# Patient Record
Sex: FEMALE | Race: BLACK OR AFRICAN AMERICAN | NOT HISPANIC OR LATINO | URBAN - METROPOLITAN AREA
[De-identification: names, ages, dates, MRNs, and addresses within clinical notes are randomized per-mention and may not be internally consistent; named-entity substitution may affect disease eponyms.]

---

## 2023-05-19 ENCOUNTER — EMERGENCY (EMERGENCY)
Facility: HOSPITAL | Age: 58
LOS: 1 days | Discharge: HOME | End: 2023-05-19
Attending: STUDENT IN AN ORGANIZED HEALTH CARE EDUCATION/TRAINING PROGRAM | Admitting: STUDENT IN AN ORGANIZED HEALTH CARE EDUCATION/TRAINING PROGRAM
Payer: SELF-PAY

## 2023-05-19 VITALS
DIASTOLIC BLOOD PRESSURE: 90 MMHG | SYSTOLIC BLOOD PRESSURE: 158 MMHG | TEMPERATURE: 98 F | OXYGEN SATURATION: 100 % | RESPIRATION RATE: 16 BRPM | HEART RATE: 96 BPM

## 2023-05-19 VITALS
RESPIRATION RATE: 17 BRPM | HEART RATE: 95 BPM | OXYGEN SATURATION: 100 % | TEMPERATURE: 98 F | DIASTOLIC BLOOD PRESSURE: 83 MMHG | SYSTOLIC BLOOD PRESSURE: 154 MMHG

## 2023-05-19 DIAGNOSIS — F20.9 SCHIZOPHRENIA, UNSPECIFIED: ICD-10-CM

## 2023-05-19 LAB
ALBUMIN SERPL ELPH-MCNC: 4.1 G/DL — SIGNIFICANT CHANGE UP (ref 3.3–5)
ALP SERPL-CCNC: 73 U/L — SIGNIFICANT CHANGE UP (ref 40–120)
ALT FLD-CCNC: 11 U/L — SIGNIFICANT CHANGE UP (ref 4–33)
AMPHET UR-MCNC: NEGATIVE — SIGNIFICANT CHANGE UP
ANION GAP SERPL CALC-SCNC: 16 MMOL/L — HIGH (ref 7–14)
APAP SERPL-MCNC: <10 UG/ML — LOW (ref 15–25)
APPEARANCE UR: ABNORMAL
AST SERPL-CCNC: 17 U/L — SIGNIFICANT CHANGE UP (ref 4–32)
B PERT DNA SPEC QL NAA+PROBE: SIGNIFICANT CHANGE UP
B PERT+PARAPERT DNA PNL SPEC NAA+PROBE: SIGNIFICANT CHANGE UP
BACTERIA # UR AUTO: ABNORMAL
BARBITURATES UR SCN-MCNC: NEGATIVE — SIGNIFICANT CHANGE UP
BASOPHILS # BLD AUTO: 0.02 K/UL — SIGNIFICANT CHANGE UP (ref 0–0.2)
BASOPHILS NFR BLD AUTO: 0.3 % — SIGNIFICANT CHANGE UP (ref 0–2)
BENZODIAZ UR-MCNC: NEGATIVE — SIGNIFICANT CHANGE UP
BILIRUB SERPL-MCNC: 0.3 MG/DL — SIGNIFICANT CHANGE UP (ref 0.2–1.2)
BILIRUB UR-MCNC: NEGATIVE — SIGNIFICANT CHANGE UP
BORDETELLA PARAPERTUSSIS (RAPRVP): SIGNIFICANT CHANGE UP
BUN SERPL-MCNC: 14 MG/DL — SIGNIFICANT CHANGE UP (ref 7–23)
C PNEUM DNA SPEC QL NAA+PROBE: SIGNIFICANT CHANGE UP
CALCIUM SERPL-MCNC: 8.9 MG/DL — SIGNIFICANT CHANGE UP (ref 8.4–10.5)
CHLORIDE SERPL-SCNC: 99 MMOL/L — SIGNIFICANT CHANGE UP (ref 98–107)
CO2 SERPL-SCNC: 25 MMOL/L — SIGNIFICANT CHANGE UP (ref 22–31)
COCAINE METAB.OTHER UR-MCNC: NEGATIVE — SIGNIFICANT CHANGE UP
COLOR SPEC: YELLOW — SIGNIFICANT CHANGE UP
CREAT SERPL-MCNC: 0.98 MG/DL — SIGNIFICANT CHANGE UP (ref 0.5–1.3)
CREATININE URINE RESULT, DAU: 163 MG/DL — SIGNIFICANT CHANGE UP
DIFF PNL FLD: NEGATIVE — SIGNIFICANT CHANGE UP
EGFR: 68 ML/MIN/1.73M2 — SIGNIFICANT CHANGE UP
EOSINOPHIL # BLD AUTO: 0.11 K/UL — SIGNIFICANT CHANGE UP (ref 0–0.5)
EOSINOPHIL NFR BLD AUTO: 1.6 % — SIGNIFICANT CHANGE UP (ref 0–6)
EPI CELLS # UR: 22 /HPF — HIGH (ref 0–5)
ETHANOL SERPL-MCNC: <10 MG/DL — SIGNIFICANT CHANGE UP
FLUAV SUBTYP SPEC NAA+PROBE: SIGNIFICANT CHANGE UP
FLUBV RNA SPEC QL NAA+PROBE: SIGNIFICANT CHANGE UP
GLUCOSE SERPL-MCNC: 154 MG/DL — HIGH (ref 70–99)
GLUCOSE UR QL: NEGATIVE — SIGNIFICANT CHANGE UP
HADV DNA SPEC QL NAA+PROBE: SIGNIFICANT CHANGE UP
HCG SERPL-ACNC: <1 MIU/ML — SIGNIFICANT CHANGE UP
HCOV 229E RNA SPEC QL NAA+PROBE: SIGNIFICANT CHANGE UP
HCOV HKU1 RNA SPEC QL NAA+PROBE: SIGNIFICANT CHANGE UP
HCOV NL63 RNA SPEC QL NAA+PROBE: SIGNIFICANT CHANGE UP
HCOV OC43 RNA SPEC QL NAA+PROBE: SIGNIFICANT CHANGE UP
HCT VFR BLD CALC: 35.8 % — SIGNIFICANT CHANGE UP (ref 34.5–45)
HGB BLD-MCNC: 11.6 G/DL — SIGNIFICANT CHANGE UP (ref 11.5–15.5)
HMPV RNA SPEC QL NAA+PROBE: SIGNIFICANT CHANGE UP
HPIV1 RNA SPEC QL NAA+PROBE: SIGNIFICANT CHANGE UP
HPIV2 RNA SPEC QL NAA+PROBE: SIGNIFICANT CHANGE UP
HPIV3 RNA SPEC QL NAA+PROBE: SIGNIFICANT CHANGE UP
HPIV4 RNA SPEC QL NAA+PROBE: SIGNIFICANT CHANGE UP
HYALINE CASTS # UR AUTO: ABNORMAL (ref 0–7)
IANC: 4.95 K/UL — SIGNIFICANT CHANGE UP (ref 1.8–7.4)
IMM GRANULOCYTES NFR BLD AUTO: 0.3 % — SIGNIFICANT CHANGE UP (ref 0–0.9)
KETONES UR-MCNC: NEGATIVE — SIGNIFICANT CHANGE UP
LEUKOCYTE ESTERASE UR-ACNC: ABNORMAL
LYMPHOCYTES # BLD AUTO: 1.21 K/UL — SIGNIFICANT CHANGE UP (ref 1–3.3)
LYMPHOCYTES # BLD AUTO: 18 % — SIGNIFICANT CHANGE UP (ref 13–44)
M PNEUMO DNA SPEC QL NAA+PROBE: SIGNIFICANT CHANGE UP
MCHC RBC-ENTMCNC: 25.8 PG — LOW (ref 27–34)
MCHC RBC-ENTMCNC: 32.4 GM/DL — SIGNIFICANT CHANGE UP (ref 32–36)
MCV RBC AUTO: 79.6 FL — LOW (ref 80–100)
METHADONE UR-MCNC: NEGATIVE — SIGNIFICANT CHANGE UP
MONOCYTES # BLD AUTO: 0.42 K/UL — SIGNIFICANT CHANGE UP (ref 0–0.9)
MONOCYTES NFR BLD AUTO: 6.2 % — SIGNIFICANT CHANGE UP (ref 2–14)
NEUTROPHILS # BLD AUTO: 4.95 K/UL — SIGNIFICANT CHANGE UP (ref 1.8–7.4)
NEUTROPHILS NFR BLD AUTO: 73.6 % — SIGNIFICANT CHANGE UP (ref 43–77)
NITRITE UR-MCNC: NEGATIVE — SIGNIFICANT CHANGE UP
NRBC # BLD: 0 /100 WBCS — SIGNIFICANT CHANGE UP (ref 0–0)
NRBC # FLD: 0 K/UL — SIGNIFICANT CHANGE UP (ref 0–0)
OPIATES UR-MCNC: NEGATIVE — SIGNIFICANT CHANGE UP
OXYCODONE UR-MCNC: NEGATIVE — SIGNIFICANT CHANGE UP
PCP SPEC-MCNC: SIGNIFICANT CHANGE UP
PCP UR-MCNC: NEGATIVE — SIGNIFICANT CHANGE UP
PH UR: 7 — SIGNIFICANT CHANGE UP (ref 5–8)
PLATELET # BLD AUTO: 317 K/UL — SIGNIFICANT CHANGE UP (ref 150–400)
POTASSIUM SERPL-MCNC: 2.8 MMOL/L — CRITICAL LOW (ref 3.5–5.3)
POTASSIUM SERPL-SCNC: 2.8 MMOL/L — CRITICAL LOW (ref 3.5–5.3)
PROT SERPL-MCNC: 7.3 G/DL — SIGNIFICANT CHANGE UP (ref 6–8.3)
PROT UR-MCNC: ABNORMAL
RAPID RVP RESULT: DETECTED
RBC # BLD: 4.5 M/UL — SIGNIFICANT CHANGE UP (ref 3.8–5.2)
RBC # FLD: 14.6 % — HIGH (ref 10.3–14.5)
RBC CASTS # UR COMP ASSIST: 4 /HPF — SIGNIFICANT CHANGE UP (ref 0–4)
RSV RNA SPEC QL NAA+PROBE: SIGNIFICANT CHANGE UP
RV+EV RNA SPEC QL NAA+PROBE: DETECTED
SALICYLATES SERPL-MCNC: <0.3 MG/DL — LOW (ref 15–30)
SARS-COV-2 RNA SPEC QL NAA+PROBE: SIGNIFICANT CHANGE UP
SODIUM SERPL-SCNC: 140 MMOL/L — SIGNIFICANT CHANGE UP (ref 135–145)
SP GR SPEC: 1.02 — SIGNIFICANT CHANGE UP (ref 1.01–1.05)
THC UR QL: NEGATIVE — SIGNIFICANT CHANGE UP
TOXICOLOGY SCREEN, DRUGS OF ABUSE, SERUM RESULT: SIGNIFICANT CHANGE UP
TSH SERPL-MCNC: 1.12 UIU/ML — SIGNIFICANT CHANGE UP (ref 0.27–4.2)
UROBILINOGEN FLD QL: SIGNIFICANT CHANGE UP
WBC # BLD: 6.73 K/UL — SIGNIFICANT CHANGE UP (ref 3.8–10.5)
WBC # FLD AUTO: 6.73 K/UL — SIGNIFICANT CHANGE UP (ref 3.8–10.5)
WBC UR QL: 16 /HPF — HIGH (ref 0–5)

## 2023-05-19 PROCEDURE — 70450 CT HEAD/BRAIN W/O DYE: CPT | Mod: 26,MA

## 2023-05-19 PROCEDURE — 90792 PSYCH DIAG EVAL W/MED SRVCS: CPT

## 2023-05-19 PROCEDURE — 99285 EMERGENCY DEPT VISIT HI MDM: CPT

## 2023-05-19 RX ORDER — CEPHALEXIN 500 MG
1 CAPSULE ORAL
Qty: 20 | Refills: 0
Start: 2023-05-19 | End: 2023-05-28

## 2023-05-19 RX ORDER — CEPHALEXIN 500 MG
500 CAPSULE ORAL ONCE
Refills: 0 | Status: COMPLETED | OUTPATIENT
Start: 2023-05-19 | End: 2023-05-19

## 2023-05-19 RX ORDER — HALOPERIDOL DECANOATE 100 MG/ML
5 INJECTION INTRAMUSCULAR ONCE
Refills: 0 | Status: COMPLETED | OUTPATIENT
Start: 2023-05-19 | End: 2023-05-19

## 2023-05-19 RX ORDER — POTASSIUM CHLORIDE 20 MEQ
40 PACKET (EA) ORAL ONCE
Refills: 0 | Status: COMPLETED | OUTPATIENT
Start: 2023-05-19 | End: 2023-05-19

## 2023-05-19 RX ADMIN — Medication 500 MILLIGRAM(S): at 13:43

## 2023-05-19 RX ADMIN — Medication 2 MILLIGRAM(S): at 10:10

## 2023-05-19 RX ADMIN — Medication 40 MILLIEQUIVALENT(S): at 13:42

## 2023-05-19 RX ADMIN — HALOPERIDOL DECANOATE 5 MILLIGRAM(S): 100 INJECTION INTRAMUSCULAR at 10:10

## 2023-05-19 NOTE — ED PROVIDER NOTE - CLINICAL SUMMARY MEDICAL DECISION MAKING FREE TEXT BOX
Pravin Salazar, DO: 57 yo f unk pmh, pw agitation. BIBEMS and PD, bedside provides collateral.  Reports patient has been agitated, banging on doors, screaming, being boulder.  Patient denies insight into condition.  Denies all acute medical complaints.  Minimal cooperation. Patient arrives agitated, unable to verbally de-escalate, requiring pharmacological intervention.  We will do AMS work-up, reassess.

## 2023-05-19 NOTE — ED PROVIDER NOTE - PATIENT PORTAL LINK FT
You can access the FollowMyHealth Patient Portal offered by [facility] by registering at the following website: [email or web address]. By joining [employer]’s FollowMyHealth portal, you will also be able to view your health information using other applications (apps) compatible with our system.

## 2023-05-19 NOTE — ED ADULT TRIAGE NOTE - CHIEF COMPLAINT QUOTE
states" take off theses cuffs ya blood clot pussy clots worthless piece of shits" pt [place] cuffs with [employer]

## 2023-05-19 NOTE — ED PROVIDER NOTE - CARE PLAN
1 Principal Discharge DX:	Agitation  Secondary Diagnosis:	Hypokalemia  Secondary Diagnosis:	Rhinovirus

## 2023-05-19 NOTE — ED BEHAVIORAL HEALTH ASSESSMENT NOTE - HPI (INCLUDE ILLNESS QUALITY, SEVERITY, DURATION, TIMING, CONTEXT, MODIFYING FACTORS, ASSOCIATED SIGNS AND SYMPTOMS)
58F, reportedly domiciled alone, on disability (wont say why), denying all psychiatric history (though PSYCKES says schizophrenia), denies prior suicide attempt, denies substance use history, denies violence/legal issues, denies PMH, brought in by EMS after neighbor reportedly called 911 after observing pt acting erratically.    Per EMS report "57 Y/O FEMALE FOUND AMBULATORY ON BEDROOM AFTER [employer] WAS ABLE TO MAKE ENTRY. PT WAS ALERT AND ORIENTED. NEIGHBOR CALLED SAYING "SHE WAS OUTSIDE SLAMMING FENCE REPEATEDLY AND THREATENING PEOPLE WALKING BY"    Patient was agitated at triage, yelling, received Haldol 5 and Ativan 2, but did not require restraints. Patient complied with medical assessment and labs.    On interview, pt laying on bed eating an orange, cooperative. She states she was in her home making breakfast, and suddenly the police walked in and took her here. She does not know why or who called. She denies any prior psychiatric history, was asked specifically about [place] treatment, which she ultimately denied after a long pause. She denies any current symptoms of depression, dorian and psychosis. She denies suicidal ideation and homicidal ideation, says "never". She denied getting into a fight or argument with anyone today, denies having trouble with the neighbors, when asked if the neighbors have a problem with her, she states "you have to ask them". States she has no family and lives alone, but has an adult daughter Jade who was at her home but left early this morning before this incident occurred. She does not have Jade's phone number, states "she changed it". Denies any substance use or medical history, denies taking any medications, denies allergies. When asked how she supports herself, she states "disability" and when asked why she is on disability, she states "my daughter applied for me, I am disabled" but does not explain further.    Police provided RN with number for daughter Myra [phone] but message was not returned.    [employer] reveals pt has a history of schizophrenia, has a 'non-medicaid' record with an [facility] ID which may indicate a prior [facility] hospitalization or but unable to verify. No medical dx in PSscrible, no medications, no other data.    No record of pt in [employer] or [employer].    See  note for additional collateral attempts. 58F, reportedly domiciled alone, on disability (wont say why), denying all psychiatric history (though [facility]), denies prior suicide attempt, denies substance use history, denies violence/legal issues, denies PMH, brought in by EMS after neighbor reportedly called 911 after observing pt acting erratically.    Per EMS report "57 Y/O FEMALE FOUND AMBULATORY ON BEDROOM AFTER [employer] WAS ABLE TO MAKE ENTRY. PT WAS ALERT AND ORIENTED. NEIGHBOR CALLED SAYING "SHE WAS OUTSIDE SLAMMING FENCE REPEATEDLY AND THREATENING PEOPLE WALKING BY"    Patient was agitated at triage, yelling profanities, received Haldol 5 and Ativan 2, but did not require restraints. Patient complied with medical assessment and labs.    On interview, pt laying on bed eating an orange, cooperative, making appropriate eye contact. Does not appear internally preoccupied. Appears angry over being brought here. She states she was in her home making breakfast, and suddenly the police walked in and took her here. She does not know why or who called. She denies any prior psychiatric history, was asked specifically about [place] treatment, which she ultimately denied after a long pause. She denies any current symptoms of depression, dorian and psychosis. She denies suicidal ideation and homicidal ideation, says "never". She denied getting into a fight or argument with anyone today, denies having trouble with the neighbors, when asked if the neighbors have a problem with her, she states "you have to ask them". States she has no family and lives alone, but has an adult daughter Jade who was at her home but left early this morning before this incident occurred. She does not have Jade's phone number, states "she changed it". Denies any substance use or medical history, denies taking any medications, denies allergies. When asked how she supports herself, she states "disability" and when asked why she is on disability, she states "my daughter applied for me, I am disabled" but does not explain further.    Police provided RN with number for daughter Myra [phone] but message was not returned.    [employer] reveals pt has a history of schizophrenia, has a 'non-medicaid' record with an [facility] ID which may indicate a prior [facility] hospitalization or but unable to verify. No medical dx in PSYCKES, no medications, no other data.    No record of pt in [employer] or I[employer].    See  note for additional collateral attempts. 58F, reportedly domiciled alone, on disability (wont say why), denying all psychiatric history (though PSDEDE says schizophrenia), denies prior suicide attempt, denies substance use history, denies violence/legal issues, denies PMH, brought in by EMS after neighbor reportedly called 911 after observing pt acting erratically.    Per EMS report "57 Y/O FEMALE FOUND AMBULATORY ON BEDROOM AFTER [employer] WAS ABLE TO MAKE ENTRY. PT WAS ALERT AND ORIENTED. NEIGHBOR CALLED SAYING "SHE WAS OUTSIDE SLAMMING FENCE REPEATEDLY AND THREATENING PEOPLE WALKING BY"    Patient was agitated at triage, yelling profanities, received Haldol 5 and Ativan 2, but did not require restraints. Patient complied with medical assessment and labs.    On interview, pt laying on bed eating an orange, cooperative, making appropriate eye contact. Does not appear internally preoccupied. Appears angry over being brought here. She states she was in her home making breakfast, and suddenly the police walked in and took her here. She does not know why or who called. She denies any prior psychiatric history, was asked specifically about [place] treatment, which she ultimately denied after a long pause. She denies any current symptoms of depression, dorian and psychosis. She denies suicidal ideation and homicidal ideation, says "never". She denied getting into a fight or argument with anyone today, denies having trouble with the neighbors, when asked if the neighbors have a problem with her, she states "you have to ask them". States she has no family and lives alone, but has an adult daughter Jade who was at her home but left early this morning before this incident occurred. She does not have Jade's phone number, states "she changed it". Denies any substance use or medical history, denies taking any medications, denies allergies. When asked how she supports herself, she states "disability" and when asked why she is on disability, she states "my daughter applied for me, I am disabled" but does not explain further.    Police provided RN with number for daughter Myra [phone] but message was not returned.    [employer] reveals pt has a history of schizophrenia, has a 'non-medicaid summary' with no record of medicaid enrollment, State  Assigned Physician Elder Miles; [facility] State ID: [id number] [facility] ID: [id number]. Which likely indicates a prior [facility] hospitalization, but unable to verify. [facility] provided services from 12/22/2004 to 4/16/2021. No medical dx in PSYCKES, no medications, no other data.    No record of pt in [employer] or I[employer].    See  note for additional collateral attempts. 58F, reportedly domiciled alone, on disability (wont say why), denying all psychiatric history (though PSDEDE says schizophrenia), denies prior suicide attempt, denies substance use history, denies violence/legal issues, denies PMH, brought in by EMS after neighbor reportedly called 911 after observing pt acting erratically.    Per EMS report "57 Y/O FEMALE FOUND AMBULATORY ON BEDROOM AFTER [employer] WAS ABLE TO MAKE ENTRY. PT WAS ALERT AND ORIENTED. NEIGHBOR CALLED SAYING "SHE WAS OUTSIDE SLAMMING FENCE REPEATEDLY AND THREATENING PEOPLE WALKING BY"    Patient was agitated at triage, yelling profanities, received Haldol 5 and Ativan 2, but did not require restraints. Patient complied with medical assessment and labs.    On interview, pt laying on bed eating an orange, cooperative, making appropriate eye contact. Does not appear internally preoccupied. Appears angry over being brought here. She states she was in her home making breakfast, and suddenly the police walked in and took her here. She does not know why or who called. She denies any prior psychiatric history, was asked specifically about [place] treatment, which she ultimately denied after a long pause. She denies any current symptoms of depression, dorian and psychosis. She denies suicidal ideation and homicidal ideation, says "never". She denied getting into a fight or argument with anyone today, denies having trouble with the neighbors, when asked if the neighbors have a problem with her, she states "you have to ask them". States she has no family and lives alone, but has an adult daughter Jade who was at her home but left early this morning before this incident occurred. She does not have Jade's phone number, states "she changed it". Denies any substance use or medical history, denies taking any medications, denies allergies. When asked how she supports herself, she states "disability" and when asked why she is on disability, she states "my daughter applied for me, I am disabled" but does not explain further.    Police provided RN with number for daughter Myra [phone] but message was not returned.    [employer] reveals pt has a history of schizophrenia, has a 'non-medicaid summary' with no record of medicaid enrollment, State  Assigned Physician Elder Miles; [facility] State ID: [id number] [facility] ID: [id number]. Which likely indicates a prior [facility] hospitalization, but unable to verify. [facility] provided services from 12/22/2004 to 4/16/2021. No medical dx in PSYCKES, no medications, no other data.    No record of pt in [employer] or [employer].    See  note for additional collateral attempts.    Patient observed for several hours in the ED. Has remained calm and cooperative, in good behavioral control, without evidence of internal preoccupation or response to internal stimuli.

## 2023-05-19 NOTE — ED BEHAVIORAL HEALTH ASSESSMENT NOTE - DESCRIPTION
reportedly lives alone and supports self with disability. has an adult daughter and a grandchild denies agitated at triage, screaming profanities, received Haldol and Ativan, later calm and cooperative, allowed medical evaluation and participated with interview.  Vital Signs Last 24 Hrs  T(C): 37.2 (19 May 2023 12:46), Max: 37.2 (19 May 2023 12:46)  T(F): 98.9 (19 May 2023 12:46), Max: 98.9 (19 May 2023 12:46)  HR: 97 (19 May 2023 12:46) (96 - 97)  BP: 139/73 (19 May 2023 12:46) (139/73 - 158/90)  BP(mean): --  RR: 17 (19 May 2023 12:46) (16 - 17)  SpO2: 99% (19 May 2023 12:46) (99% - 100%)    Parameters below as of 19 May 2023 12:46  Patient On (Oxygen Delivery Method): room air

## 2023-05-19 NOTE — ED BEHAVIORAL HEALTH ASSESSMENT NOTE - RISK ASSESSMENT
acute risks include lack of outpatient care, unclear family involvement. chronic risks include history of schizophrenia with presumed state hospitalization. protective factors include denial of all psychiatric symptoms and history, denial of prior suicide attempt or violence, denial of substance use or medical history. LOW IMMINENT risk not meeting dangerousness criteria for involuntary commitment. Will provide list of outpatient resources to further mitigate risk.

## 2023-05-19 NOTE — ED ADULT NURSE NOTE - OBJECTIVE STATEMENT
Received pt in  pt bought in by [employer]/[place]S from home for erratic behaviour pt yelling & screaming profanities denies si/hi presently, safety & comfort measures maintained eval on going.

## 2023-05-19 NOTE — ED BEHAVIORAL HEALTH NOTE - BEHAVIORAL HEALTH NOTE
Writer met with patient at bedside to discuss discharge. Patient requested assistance with taxi and says she does not have funds for an uber. She reports that she was brought from her home and did not have clothes. patient reports having a size large shirt and size large pants. she reports having slippers with her. patient reports her address is [phone] [place] [id number].     Writer contacted [facility] [id number] and requested clothing for the patient.    Writer to schedule taxi. Writer met with patient at bedside to discuss discharge. Patient requested assistance with taxi and says she does not have funds for an uber. She reports that she was brought from her home and did not have clothes. patient reports having a size large shirt and size large pants. she reports having slippers with her. patient reports her address is [phone] [place] [address].     Writer contacted [facility] [id number] and requested clothing for the patient.    Writer schedule taxi via j[employer] account 50 inv#[id number].

## 2023-05-19 NOTE — ED ADULT TRIAGE NOTE - CCCP TRG CHIEF CMPLNT
refused vitals yelling at triage time refused questions at triage time appears very agitated/agitation

## 2023-05-19 NOTE — ED PROVIDER NOTE - OBJECTIVE STATEMENT
59 yo f unk pmh, pw agitation. BIBEMS and PD, bedside provides collateral.  Reports patient has been agitated, banging on doors, screaming, being boulder.  Patient denies insight into condition.  Denies all acute medical complaints.  Minimal cooperation.

## 2023-05-19 NOTE — ED BEHAVIORAL HEALTH ASSESSMENT NOTE - SUMMARY
58F, reportedly domiciled alone, on disability (wont say why), denying all psychiatric history (though RANJANA says schizophrenia), denies prior suicide attempt, denies substance use history, denies violence/legal issues, denies PMH, brought in by EMS after neighbor reportedly called 911 after observing pt acting erratically. 58F, reportedly domiciled alone, on disability (wont say why), denying all psychiatric history (though RANJANA says schizophrenia), denies prior suicide attempt, denies substance use history, denies violence/legal issues, denies PMH, brought in by EMS after neighbor reportedly called 911 after observing pt acting erratically.  Patient arrived at triage screaming profanities and required medication, but calmed once the handcuffs were removed. She allowed medical assessment, went calmly for CT, and participated with interview. Although pt likely minimizing history, she presents as calm, cooperative, well related, makes appropriate eye contact. Does not appear internally preoccupied, is not observed to respond to internal stimuli. Patient denies all symptoms of a mood or psychotic episode and denies SI/HI. 58F, reportedly domiciled alone, on disability (wont say why), denying all psychiatric history (though ROSEYCARLITA says schizophrenia), denies prior suicide attempt, denies substance use history, denies violence/legal issues, denies PMH, brought in by EMS after neighbor reportedly called 911 after observing pt acting erratically.  Patient arrived at triage screaming profanities and required medication, but calmed once the handcuffs were removed. She allowed medical assessment, went calmly for CT, and participated with interview. Although pt likely minimizing history, she presents as calm, cooperative, well related, makes appropriate eye contact. Does not appear internally preoccupied, is not observed to respond to internal stimuli. Patient denies all symptoms of a mood or psychotic episode and denies SI/HI. Observed for several hours in the ED, pt has been interacting appropriately, remained in behavioral control, without evidence of formal thought disorder, internal preoccupation or response to internal stimuli. Patient reassessed and again denies any altercation or conflict, denies any psychiatric history and all psychiatric symptoms. Numerous attempts to obtain collateral were made without success. Based on patient's mental status exam, there is no indication that she is an imminent threat of harm to self or others warranting involuntary commitment at this time. No grounds to hold patient against her will. RYANNE.

## 2023-05-19 NOTE — ED PROVIDER NOTE - PHYSICAL EXAMINATION
General: agitated  HEENT: ncat  Neck: trachea ml  CV: well perfused  Resp: breathing non labored   MSK: full range of motion, no cyanosis, no edema, no clubbing, no immobility  Neuro: CN II-XII grossly intact, muscle strength 5/5 in all extremities, normal gait  Skin: no rashes, skin intact   psych: agitated

## 2023-05-19 NOTE — ED BEHAVIORAL HEALTH ASSESSMENT NOTE - OTHER PAST PSYCHIATRIC HISTORY (INCLUDE DETAILS REGARDING ONSET, COURSE OF ILLNESS, INPATIENT/OUTPATIENT TREATMENT)
pt denies all history including hospitalizations, medication trials and suicide attempts. Per RANJANA, diagnosed with schizophrenia, did have treatment at [facility] many years ago with Dr. Miles and was on an JUAN

## 2023-05-19 NOTE — ED ADULT NURSE NOTE - NSFALLUNIVINTERV_ED_ALL_ED
Bed/Stretcher in lowest position, wheels locked, appropriate side rails in place/Call bell, personal items and telephone in reach/Instruct patient to call for assistance before getting out of bed/chair/stretcher/Non-slip footwear applied when patient is off stretcher/[place] to call system/Physically safe environment - no spills, clutter or unnecessary equipment/Purposeful proactive rounding/Room/bathroom lighting operational, light cord in reach

## 2023-05-19 NOTE — ED BEHAVIORAL HEALTH NOTE - BEHAVIORAL HEALTH NOTE
worker printed [employer] information for information regarding patient. No collateral numbers listed. worker printed [employer] information for information regarding patient. No collateral numbers listed.  AS PER [employer], patient has provider listed dr. Miles ( [phone]). Writer called [facility].73 and spoke to Dr. Miles who states that he has not seen the patient for 7-9 years and the patient was discharged from clinic. He states that the patient has schizophrenia and was on an injection. He states that he is unable to say if the patient was inpatient at [place].   As per provider worker called patient’s daughter Farhana ([phone]) and left message for call back. Worker  then called back and it states the number that is dialed is not answering. worker printed [employer] information for information regarding patient. No collateral numbers listed.  AS PER [employer], patient has provider listed dr. Miles ( [phone]). Writer called [facility].73 and spoke to Dr. Miles who states that he has not seen the patient for 7-9 years and the patient was discharged from clinic. He states that the patient has schizophrenia and was on an injection. He states that he is unable to say if the patient was inpatient at [facility].   As per provider worker called patient’s daughter Farhana ([phone]) and left message for call back. Worker  then called back and it states the number that is dialed is not answering.    worker met with patient who states that she lives alone and her daughter comes by form time to time. No collateral numbers provided. Pt has no cell phone. Patient states she is not sure why she was brought in by ems.

## 2023-05-21 LAB
CULTURE RESULTS: SIGNIFICANT CHANGE UP
SPECIMEN SOURCE: SIGNIFICANT CHANGE UP

## 2023-09-12 ENCOUNTER — INPATIENT (INPATIENT)
Facility: HOSPITAL | Age: 58
LOS: 68 days | Discharge: HOME | End: 2023-11-20
Attending: PSYCHIATRY & NEUROLOGY | Admitting: PSYCHIATRY & NEUROLOGY
Payer: MEDICARE

## 2023-09-12 VITALS
HEART RATE: 90 BPM | SYSTOLIC BLOOD PRESSURE: 193 MMHG | RESPIRATION RATE: 18 BRPM | DIASTOLIC BLOOD PRESSURE: 108 MMHG | OXYGEN SATURATION: 100 %

## 2023-09-12 LAB
ALBUMIN SERPL ELPH-MCNC: 3.6 G/DL — SIGNIFICANT CHANGE UP (ref 3.3–5)
ALP SERPL-CCNC: 67 U/L — SIGNIFICANT CHANGE UP (ref 40–120)
ALT FLD-CCNC: 16 U/L — SIGNIFICANT CHANGE UP (ref 4–33)
AMPHET UR-MCNC: NEGATIVE — SIGNIFICANT CHANGE UP
ANION GAP SERPL CALC-SCNC: 9 MMOL/L — SIGNIFICANT CHANGE UP (ref 7–14)
APAP SERPL-MCNC: <10 UG/ML — LOW (ref 15–25)
APPEARANCE UR: ABNORMAL
AST SERPL-CCNC: 17 U/L — SIGNIFICANT CHANGE UP (ref 4–32)
BACTERIA # UR AUTO: ABNORMAL /HPF
BARBITURATES UR SCN-MCNC: NEGATIVE — SIGNIFICANT CHANGE UP
BASOPHILS # BLD AUTO: 0.02 K/UL — SIGNIFICANT CHANGE UP (ref 0–0.2)
BASOPHILS NFR BLD AUTO: 0.3 % — SIGNIFICANT CHANGE UP (ref 0–2)
BENZODIAZ UR-MCNC: NEGATIVE — SIGNIFICANT CHANGE UP
BILIRUB SERPL-MCNC: <0.2 MG/DL — SIGNIFICANT CHANGE UP (ref 0.2–1.2)
BILIRUB UR-MCNC: NEGATIVE — SIGNIFICANT CHANGE UP
BUN SERPL-MCNC: 19 MG/DL — SIGNIFICANT CHANGE UP (ref 7–23)
CALCIUM SERPL-MCNC: 8.4 MG/DL — SIGNIFICANT CHANGE UP (ref 8.4–10.5)
CHLORIDE SERPL-SCNC: 105 MMOL/L — SIGNIFICANT CHANGE UP (ref 98–107)
CO2 SERPL-SCNC: 26 MMOL/L — SIGNIFICANT CHANGE UP (ref 22–31)
COCAINE METAB.OTHER UR-MCNC: NEGATIVE — SIGNIFICANT CHANGE UP
COLOR SPEC: YELLOW — SIGNIFICANT CHANGE UP
CREAT SERPL-MCNC: 1.12 MG/DL — SIGNIFICANT CHANGE UP (ref 0.5–1.3)
CREATININE URINE RESULT, DAU: 296 MG/DL — SIGNIFICANT CHANGE UP
DIFF PNL FLD: ABNORMAL
EGFR: 58 ML/MIN/1.73M2 — LOW
EOSINOPHIL # BLD AUTO: 0.21 K/UL — SIGNIFICANT CHANGE UP (ref 0–0.5)
EOSINOPHIL NFR BLD AUTO: 3.2 % — SIGNIFICANT CHANGE UP (ref 0–6)
ETHANOL SERPL-MCNC: <10 MG/DL — SIGNIFICANT CHANGE UP
FLUAV AG NPH QL: SIGNIFICANT CHANGE UP
FLUBV AG NPH QL: SIGNIFICANT CHANGE UP
GLUCOSE SERPL-MCNC: 137 MG/DL — HIGH (ref 70–99)
GLUCOSE UR QL: NEGATIVE MG/DL — SIGNIFICANT CHANGE UP
HCT VFR BLD CALC: 34.3 % — LOW (ref 34.5–45)
HGB BLD-MCNC: 10.9 G/DL — LOW (ref 11.5–15.5)
IANC: 4.21 K/UL — SIGNIFICANT CHANGE UP (ref 1.8–7.4)
IMM GRANULOCYTES NFR BLD AUTO: 0.5 % — SIGNIFICANT CHANGE UP (ref 0–0.9)
KETONES UR-MCNC: ABNORMAL MG/DL
LEUKOCYTE ESTERASE UR-ACNC: ABNORMAL
LYMPHOCYTES # BLD AUTO: 1.44 K/UL — SIGNIFICANT CHANGE UP (ref 1–3.3)
LYMPHOCYTES # BLD AUTO: 21.7 % — SIGNIFICANT CHANGE UP (ref 13–44)
MCHC RBC-ENTMCNC: 26 PG — LOW (ref 27–34)
MCHC RBC-ENTMCNC: 31.8 GM/DL — LOW (ref 32–36)
MCV RBC AUTO: 81.7 FL — SIGNIFICANT CHANGE UP (ref 80–100)
METHADONE UR-MCNC: NEGATIVE — SIGNIFICANT CHANGE UP
MONOCYTES # BLD AUTO: 0.73 K/UL — SIGNIFICANT CHANGE UP (ref 0–0.9)
MONOCYTES NFR BLD AUTO: 11 % — SIGNIFICANT CHANGE UP (ref 2–14)
NEUTROPHILS # BLD AUTO: 4.21 K/UL — SIGNIFICANT CHANGE UP (ref 1.8–7.4)
NEUTROPHILS NFR BLD AUTO: 63.3 % — SIGNIFICANT CHANGE UP (ref 43–77)
NITRITE UR-MCNC: NEGATIVE — SIGNIFICANT CHANGE UP
NRBC # BLD: 0 /100 WBCS — SIGNIFICANT CHANGE UP (ref 0–0)
NRBC # FLD: 0 K/UL — SIGNIFICANT CHANGE UP (ref 0–0)
OPIATES UR-MCNC: NEGATIVE — SIGNIFICANT CHANGE UP
OXYCODONE UR-MCNC: NEGATIVE — SIGNIFICANT CHANGE UP
PCP SPEC-MCNC: SIGNIFICANT CHANGE UP
PCP UR-MCNC: NEGATIVE — SIGNIFICANT CHANGE UP
PH UR: 6 — SIGNIFICANT CHANGE UP (ref 5–8)
PLATELET # BLD AUTO: 267 K/UL — SIGNIFICANT CHANGE UP (ref 150–400)
POTASSIUM SERPL-MCNC: 3.8 MMOL/L — SIGNIFICANT CHANGE UP (ref 3.5–5.3)
POTASSIUM SERPL-SCNC: 3.8 MMOL/L — SIGNIFICANT CHANGE UP (ref 3.5–5.3)
PROT SERPL-MCNC: 7.4 G/DL — SIGNIFICANT CHANGE UP (ref 6–8.3)
PROT UR-MCNC: 100 MG/DL
RBC # BLD: 4.2 M/UL — SIGNIFICANT CHANGE UP (ref 3.8–5.2)
RBC # FLD: 14.3 % — SIGNIFICANT CHANGE UP (ref 10.3–14.5)
RBC CASTS # UR COMP ASSIST: >50 /HPF — HIGH (ref 0–4)
RSV RNA NPH QL NAA+NON-PROBE: SIGNIFICANT CHANGE UP
SALICYLATES SERPL-MCNC: <0.3 MG/DL — LOW (ref 15–30)
SARS-COV-2 RNA SPEC QL NAA+PROBE: SIGNIFICANT CHANGE UP
SODIUM SERPL-SCNC: 140 MMOL/L — SIGNIFICANT CHANGE UP (ref 135–145)
SP GR SPEC: 1.03 — HIGH (ref 1–1.03)
THC UR QL: NEGATIVE — SIGNIFICANT CHANGE UP
TSH SERPL-MCNC: 1.53 UIU/ML — SIGNIFICANT CHANGE UP (ref 0.27–4.2)
UROBILINOGEN FLD QL: 1 MG/DL — SIGNIFICANT CHANGE UP (ref 0.2–1)
WBC # BLD: 6.64 K/UL — SIGNIFICANT CHANGE UP (ref 3.8–10.5)
WBC # FLD AUTO: 6.64 K/UL — SIGNIFICANT CHANGE UP (ref 3.8–10.5)
WBC UR QL: >50 /HPF — HIGH (ref 0–5)

## 2023-09-12 PROCEDURE — 99285 EMERGENCY DEPT VISIT HI MDM: CPT

## 2023-09-12 RX ORDER — RISPERIDONE 4 MG/1
1 TABLET ORAL EVERY 12 HOURS
Refills: 0 | Status: DISCONTINUED | OUTPATIENT
Start: 2023-09-13 | End: 2023-09-26

## 2023-09-12 RX ORDER — HALOPERIDOL DECANOATE 100 MG/ML
5 INJECTION INTRAMUSCULAR EVERY 4 HOURS
Refills: 0 | Status: DISCONTINUED | OUTPATIENT
Start: 2023-09-13 | End: 2023-11-20

## 2023-09-12 RX ORDER — CEFPODOXIME PROXETIL 100 MG
200 TABLET ORAL EVERY 12 HOURS
Refills: 0 | Status: DISCONTINUED | OUTPATIENT
Start: 2023-09-12 | End: 2023-09-13

## 2023-09-12 RX ORDER — HALOPERIDOL DECANOATE 100 MG/ML
5 INJECTION INTRAMUSCULAR ONCE
Refills: 0 | Status: COMPLETED | OUTPATIENT
Start: 2023-09-12 | End: 2023-09-12

## 2023-09-12 RX ORDER — HALOPERIDOL DECANOATE 100 MG/ML
5 INJECTION INTRAMUSCULAR ONCE
Refills: 0 | Status: DISCONTINUED | OUTPATIENT
Start: 2023-09-13 | End: 2023-11-20

## 2023-09-12 RX ADMIN — Medication 200 MILLIGRAM(S): at 23:45

## 2023-09-12 RX ADMIN — Medication 2 MILLIGRAM(S): at 18:18

## 2023-09-12 RX ADMIN — HALOPERIDOL DECANOATE 5 MILLIGRAM(S): 100 INJECTION INTRAMUSCULAR at 18:18

## 2023-09-12 NOTE — ED PROVIDER NOTE - PHYSICAL EXAMINATION
awake alert  hyperverbal, aggressive, agitated, screaming at staff/nypd  respirations unlabored  makes eye contact  ambulatory with steady gait  moving all extremities

## 2023-09-12 NOTE — ED ADULT NURSE NOTE - OBJECTIVE STATEMENT
BREAK RN: pt. received to  from ambulance bay triage brought in by [facility] and [employer] in handcuffs but not under arrest presenting for a psych eval. pt. uncompliant with interview upon initial meeting, using profound vocabulary as well as combative behavior. pt. medicated as per ZEINA Dailey orders. As per triage note, pt. has been uncompliant with medications for schizophrenia x3 years. as per pt., pt. only talks to god. Denies SI/HI, AH/VH, ETOH/Drug use. Pt. belongings secured. pt. wanded for safety. pt. assisted to change into  Clothing. eval on going at this time.

## 2023-09-12 NOTE — ED BEHAVIORAL HEALTH ASSESSMENT NOTE - PSYCHIATRIC ISSUES AND PLAN (INCLUDE STANDING AND PRN MEDICATION)
Risperdal 1 mg bid - consider JUAN Risperdal 1 mg bid - consider JUAN Ativan 2 mg Haldol 5 mg po/im q6h prn agitation

## 2023-09-12 NOTE — ED BEHAVIORAL HEALTH ASSESSMENT NOTE - RISK ASSESSMENT
low suicide risk- although patient is psychotic and she denies suicidal ideation, has no known suicide attempts, no known suicide attempt and has supportive daughter.
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
## 2023-09-12 NOTE — ED ADULT TRIAGE NOTE - CHIEF COMPLAINT QUOTE
Pt brought in by EMS form home with [facility] for psych eval. pt has a hx of schizophrenia and is non complaint with meds x 3 years. pts daughter went ot visit her and is concerned. Pt yelling in triage. pt refusing temp in triage.

## 2023-09-12 NOTE — ED BEHAVIORAL HEALTH ASSESSMENT NOTE - OTHER PAST PSYCHIATRIC HISTORY (INCLUDE DETAILS REGARDING ONSET, COURSE OF ILLNESS, INPATIENT/OUTPATIENT TREATMENT)
pt denies all history including hospitalizations, medication trials and suicide attempts. Per [facility], diagnosed with schizophrenia, Collateral from daughter reported patient was hospitalized at [facility] in 2004 and outpatient many years ago with Dr. Miles ( [facility]) and was on an JUAN
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
## 2023-09-12 NOTE — ED BEHAVIORAL HEALTH ASSESSMENT NOTE - DESCRIPTION
arrived agitated, yelling, not making sense.    ICU Vital Signs Last 24 Hrs  T(C): --  T(F): --  HR: 90 (12 Sep 2023 17:10) (90 - 90)  BP: 193/108 (12 Sep 2023 17:10) (193/108 - 193/108)  BP(mean): --  ABP: --  ABP(mean): --  RR: 18 (12 Sep 2023 17:10) (18 - 18)  SpO2: 100% (12 Sep 2023 17:10) (100% - 100%)    O2 Parameters below as of 12 Sep 2023 17:10  Patient On (Oxygen Delivery Method): room air denies reportedly lives alone and supports self with disability. has an adult daughter and a grandchild
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
## 2023-09-12 NOTE — ED BEHAVIORAL HEALTH ASSESSMENT NOTE - NSBHATTESTAPPAMEND_PSY_A_CORE
Emergency Department    [address] [place] [address]    Phone:  [phone]    Fax:  [phone]                                       Bouchra Flanagan   MRN: [id number]    Department:   Emergency Department   Date of Visit:  5/14/2018           After Visit Summary Signature Page     I have received my discharge instructions, and my questions have been answered. I have discussed any challenges I see with this plan with the nurse or doctor.    ..........................................................................................................................................  Patient/Patient Representative Signature      ..........................................................................................................................................  Patient Representative Print Name and Relationship to Patient    ..................................................               ................................................  Date                                            Time    ..........................................................................................................................................  Reviewed by Signature/Title    ...................................................              ..............................................  Date                                                            Time           I have personally seen and examined this patient. I fully participated in the care of this patient. I have made amendments to the documentation where appropriate and otherwise agree with the history, physical exam, and plan as documented by the YOGESH

## 2023-09-12 NOTE — ED ADULT NURSE NOTE - HOW PATIENT ADDRESSED, PROFILE
Nausea and back pain for awhile.  Last 2 weeks have been worse.  Seen at [facility] yesterday and labs were drawn but she has to wait til Monday for lab results and she thinks her pain is too bad to wait  
Jenny

## 2023-09-12 NOTE — ED BEHAVIORAL HEALTH ASSESSMENT NOTE - DETAILS
daughter aware will be provided to accepting team. pt denies any suicidal ideation or suicide attempt left voicemail message for Dr. Mendelowitz

## 2023-09-12 NOTE — ED BEHAVIORAL HEALTH ASSESSMENT NOTE - SELF INJURIOUS BEHAVIOR WITHOUT SUICIDAL INTENT:
Group Topic: BH Education    Date: 2/19/2020  Start Time: 1030  End Time: 1120  Facilitators: UNA Ayala    Focus: Symptoms of Relapse  Number in attendance: 7    The education group on symptoms of relapse offers patients information regarding symptoms they may have that are warning signs that a relapse may be coming. Patients are offered information on how these symptoms could lead to a relapse, as well as information on how these symptoms can be prevented.    Method: Group  Attendance: Present  Participation: Minimal  Patient Response: Attentive  Mood: Stressed  Affect: Calm  Behavior/Socialization: Cooperative and Engaged  Thought Process: Focused and Tracking  Task Performance: Follows directions     Pt. Attended education group on Symptoms of Relapse this morning. Pt. Was quiet for the duration of group, but demonstrated attention through eye contact.   None known

## 2023-09-12 NOTE — ED ADULT NURSE NOTE - CHIEF COMPLAINT QUOTE
Pt brought in by EMS form home with [facility] for psych eval. pt has a hx of schizophrenia and is non complaint with meds x 3 years. pts daughter went ot visit her and is concerned. Pt yelling in triage. pt refusing temp in triage.
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
## 2023-09-12 NOTE — ED BEHAVIORAL HEALTH ASSESSMENT NOTE - MEDICAL ISSUES AND PLAN (INCLUDE STANDING AND PRN MEDICATION)
monitor blood pressure - may require standing hypertensive medication ED medical provider started Vantin 200 mg po q12 (pt has received 2 doses in ED) for UTI; monitor blood pressure - may require standing hypertensive medication

## 2023-09-12 NOTE — ED PROVIDER NOTE - PROGRESS NOTE DETAILS
UA positive, vantin 200mg PO BID ordered. culture added. HOMAR: Pt was signed out to me pending admission/transfer to an inpatient psych facility. Pt has no complaints. Will continue to monitor pending bed availability. s/o rec from dr. fong pending bed availability for psych admission, per psych now available, admission placed per their recs, patient calm. to continue abx at [facility] for positive urine.

## 2023-09-13 DIAGNOSIS — F20.9 SCHIZOPHRENIA, UNSPECIFIED: ICD-10-CM

## 2023-09-13 RX ORDER — HALOPERIDOL DECANOATE 100 MG/ML
5 INJECTION INTRAMUSCULAR ONCE
Refills: 0 | Status: COMPLETED | OUTPATIENT
Start: 2023-09-13 | End: 2023-09-13

## 2023-09-13 RX ORDER — AMLODIPINE BESYLATE 2.5 MG/1
5 TABLET ORAL ONCE
Refills: 0 | Status: DISCONTINUED | OUTPATIENT
Start: 2023-09-13 | End: 2023-09-26

## 2023-09-13 RX ORDER — CEFPODOXIME PROXETIL 100 MG
200 TABLET ORAL ONCE
Refills: 0 | Status: COMPLETED | OUTPATIENT
Start: 2023-09-13 | End: 2023-09-13

## 2023-09-13 RX ORDER — CEFPODOXIME PROXETIL 100 MG
200 TABLET ORAL EVERY 12 HOURS
Refills: 0 | Status: DISCONTINUED | OUTPATIENT
Start: 2023-09-13 | End: 2023-09-13

## 2023-09-13 RX ADMIN — HALOPERIDOL DECANOATE 5 MILLIGRAM(S): 100 INJECTION INTRAMUSCULAR at 13:30

## 2023-09-13 RX ADMIN — Medication 2 MILLIGRAM(S): at 13:30

## 2023-09-13 RX ADMIN — Medication 200 MILLIGRAM(S): at 06:17

## 2023-09-13 NOTE — ED BEHAVIORAL HEALTH PROGRESS NOTE - CASE SUMMARY/FORMULATION (CLEARLY DOCUMENT RATIONALE FOR DISPOSITION CHANGE)
Patient is a 58F, domiciled alone, on disability, PPH of schizophrenia, not in current treatment, previously inpatient [place] resident in 2004, denies prior suicide attempt, denies substance use history, denies violence/legal issues, no known PMH, brought in by EMS; activated by daughter for bizarre behavior and worsening psychotic symptoms. Upon arrival to ED patient was agitated, rambling and not making sense. She did not respond to verbal deescalation and received Ativan 2 mg Haldol 5mg IM.     Patient presents psychotic with A/H.  Patient was guarded but religiously preoccupied during interview. She did not appear to be responding to internal stimuli. Collateral from daughter reports patient is paranoid and talking to self. Patient home is destroyed, not showering and soiling self. These symptoms represent a change from baseline and patient  The patient presents with acute risk requiring inpatient psychiatric hospitalization for safety and stabilization.

## 2023-09-13 NOTE — ED BEHAVIORAL HEALTH PROGRESS NOTE - MEDICAL ISSUES AND PLAN (INCLUDE STANDING AND PRN MEDICATION)
ED medical provider started Vantin 200 mg po q12 (pt has received 2 doses in ED) for UTI; monitor blood pressure - may require standing hypertensive medication

## 2023-09-13 NOTE — ED ADULT NURSE REASSESSMENT NOTE - NS ED NURSE REASSESS COMMENT FT1
Pt sleeping in  room.  Respirations even and unlabored. vitals signs stable. Will continue to monitor.
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
## 2023-09-13 NOTE — ED ADULT NURSE REASSESSMENT NOTE - NS ED NURSE REASSESS COMMENT FT1
Pt refusing medications for hypertension. Update given to TRACIE Champagne at [facility] regarding PT blood pressure. Pt remains accepted. EMS at bedside for transport.

## 2023-09-13 NOTE — ED BEHAVIORAL HEALTH PROGRESS NOTE - SUMMARY
Patient is a 58F, domiciled alone, on disability, PPH of schizophrenia, not in current treatment, previously inpatient [place] resident in 2004, denies prior suicide attempt, denies substance use history, denies violence/legal issues, no known PMH, brought in by EMS; activated by daughter for bizarre behavior and worsening psychotic symptoms. Upon arrival to ED patient was agitated, rambling and not making sense. She did not respond to verbal deescalation and received Ativan 2 mg Haldol 5mg IM.     Patient presents psychotic with A/H.  Patient was guarded but religiously preoccupied during interview. She did not appear to be responding to internal stimuli. Collateral from daughter reports patient is paranoid and talking to self. Patient home is destroyed, not showering and soiling self. These symptoms represent a change from baseline and patient  The patient presents with risk requiring inpatient psychiatric hospitalization for safety and stabilization once medically cleared. At this time there are no beds and patient will remain in the ED overnight. Discussed urinalysis and blood pressure with ZEINA De La Garza.     Start Risperdal 1 mg am and hs  Haldol 5 mg Ativan 2 mg po/im q6h prn agitation

## 2023-09-13 NOTE — PHARMACOTHERAPY INTERVENTION NOTE - COMMENTS
spoke to medicine ZEINA Guy to order culture/sensitivity; IOC to order just 1 time dose for tonight; morning crew to follow up; pt already received 2 doses in ED; need stop date; questionable 200mg dose- uncomplicated UTi dose is 100mg bid

## 2023-09-13 NOTE — ED ADULT NURSE REASSESSMENT NOTE - NS ED NURSE REASSESS COMMENT FT1
Pt alert and oriented. morning meds given. Respirations even and unlabored. no distress noted. vitals stable. Will continue to monitor.

## 2023-09-14 DIAGNOSIS — F20.9 SCHIZOPHRENIA, UNSPECIFIED: ICD-10-CM

## 2023-09-14 LAB
CHOLEST SERPL-MCNC: 229 MG/DL — HIGH
HDLC SERPL-MCNC: 62 MG/DL — SIGNIFICANT CHANGE UP
LIPID PNL WITH DIRECT LDL SERPL: 148 MG/DL — HIGH
NON HDL CHOLESTEROL: 167 MG/DL — HIGH
TRIGL SERPL-MCNC: 96 MG/DL — SIGNIFICANT CHANGE UP

## 2023-09-14 PROCEDURE — 99232 SBSQ HOSP IP/OBS MODERATE 35: CPT

## 2023-09-14 NOTE — BH INPATIENT PSYCHIATRY ASSESSMENT NOTE - NSBHMETABOLIC_PSY_ALL_CORE_FT
BMI:   HbA1c:   Glucose:   BP: 122/67 (09-14-23 @ 07:37) (121/56 - 193/108)  Lipid Panel:  BMI:   HbA1c:   Glucose:   BP: 121/73 (09-15-23 @ 08:09) (121/56 - 193/108)  Lipid Panel: Date/Time: 09-14-23 @ 09:30  Cholesterol, Serum: 229  Direct LDL: --  HDL Cholesterol, Serum: 62  Total Cholesterol/HDL Ration Measurement: --  Triglycerides, Serum: 96
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
## 2023-09-14 NOTE — BH INPATIENT PSYCHIATRY ASSESSMENT NOTE - CURRENT MEDICATION
MEDICATIONS  (STANDING):  amLODIPine   Tablet 5 milliGRAM(s) Oral once  risperiDONE   Tablet 1 milliGRAM(s) Oral every 12 hours    MEDICATIONS  (PRN):  haloperidol     Tablet 5 milliGRAM(s) Oral every 4 hours PRN agitation  haloperidol    Injectable 5 milliGRAM(s) IntraMuscular Once PRN agitation  LORazepam     Tablet 2 milliGRAM(s) Oral every 6 hours PRN Agitation  LORazepam   Injectable 2 milliGRAM(s) IntraMuscular Once PRN Agitation

## 2023-09-14 NOTE — BH INPATIENT PSYCHIATRY ASSESSMENT NOTE - NSBHASSESSSUMMFT_PSY_ALL_CORE
58F,  PPH of schizophrenia, not in current treatment, previously inpatient [place] resident in 2004, denies prior suicide attempt, denies substance use history, denies violence/legal issues, no known PMH, brought in by EMS; activated by daughter for bizarre behavior and worsening psychotic symptoms    Patient uncooperative, irritable, paranoid and agitated on examination  With thought disorder, Grandiose Judaism delusions, AH and hx of being unable to care for self secondary to psychosis    Impression  Exacerbation of schizophrenia in the context of noncompliance    PLAN  Patient requires acute inpatient care for treatment of psychosis.   Patient admitted on a 939 emergency status.    Patient does not require constant observation at this time and will follow with  15 minute checks.   Patient will be started on SGA trial with risperidone with plan for transition to Paliperidone JUAN  Patient denies having illness and states she will refuse all treatment and will likely require TOO if she refuses  Treatment will include individual therapy/supportive therapy/ rehab therapy/ psychopharmacological therapy and milieu therapy

## 2023-09-14 NOTE — PSYCHIATRIC REHAB INITIAL EVALUATION - NSBHPRRECOMMEND_PSY_ALL_CORE
Writer met with patient in order to orient patient to unit provide patient with a unit schedule, and introduce patient to psychiatric rehabilitation staff and department functions. Pt was in her bed and minimally receptive to writer's engagement. Pt appeared to be guarded and irritable. Pt was observed talking to herself. Pt did not wish to participate in the meeting and told writer to leave her room. Therefore, the following information was obtained from pt's chart. Pt was brought in by EMS activated by her daughter for bizarre behavior and worsening psychotic symptoms. Pt was agitated and received IM in ED. Pt continues to be guarded and minimally cooperative on the unit. Writer and patient were unable to establish a collaborative psychiatric rehabilitation goal, therefore one goal will be chosen for the pt. Psychiatric Rehabilitation staff will continue to engage patient daily in order to develop therapeutic rapport. Patient will be oriented to and engaged in safety planning upon remediation of symptoms.

## 2023-09-14 NOTE — BH SOCIAL WORK INITIAL PSYCHOSOCIAL EVALUATION - NSPROGENSOURCEINFOFT_PSY_ALL_CORE
EMR  Writer is a social work float assigned to assist with admissions on multiple units for today. Unit  is meeting with the patient.

## 2023-09-14 NOTE — BH INPATIENT PSYCHIATRY ASSESSMENT NOTE - OTHER PAST PSYCHIATRIC HISTORY (INCLUDE DETAILS REGARDING ONSET, COURSE OF ILLNESS, INPATIENT/OUTPATIENT TREATMENT)
pt denies all history including hospitalizations, medication trials and suicide attempts. Per [facility], diagnosed with schizophrenia, Collateral from daughter reported patient was hospitalized at [facility] in 2004 and outpatient many years ago with Dr. Miles ( [facility]) and was on an JUAN

## 2023-09-14 NOTE — BH INPATIENT PSYCHIATRY ASSESSMENT NOTE - MSE UNSTRUCTURED FT
On exam today the patient is irritable and uncooperative with questions.    Speech is rapid and pressured.  Thought process: with disorder of thought process.    Thought content: with paranoid beliefs.   Affect: Flat.    Patient refuses to answer questions about Suicide, homicide, perceptual disturbances, Mood, or Memory  Patient is Alert and oriented   Insight and judgment are impaired. Impulse control is tenuous at this time

## 2023-09-14 NOTE — BH INPATIENT PSYCHIATRY ASSESSMENT NOTE - NSBHATTESTBILLING_PSY_A_CORE
84063-Lvpbepohgi OBS or IP - moderate complexity OR 35-49 mins 99222-Initial OBS or IP - moderate complexity OR 55-74 mins

## 2023-09-14 NOTE — BH SOCIAL WORK INITIAL PSYCHOSOCIAL EVALUATION - TRANSPORTATION
no [Alert] : alert [No Acute Distress] : no acute distress [Playful] : playful [Normocephalic] : normocephalic [Conjunctivae with no discharge] : conjunctivae with no discharge [PERRL] : PERRL [EOMI Bilateral] : EOMI bilateral [Clear Tympanic membranes with present light reflex and bony landmarks] : clear tympanic membranes with present light reflex and bony landmarks [Auricles Well Formed] : auricles well formed [Pink Nasal Mucosa] : pink nasal mucosa [No Discharge] : no discharge [Nares Patent] : nares patent [Uvula Midline] : uvula midline [Palate Intact] : palate intact [No Caries] : no caries [Nonerythematous Oropharynx] : nonerythematous oropharynx [Trachea Midline] : trachea midline [Supple, full passive range of motion] : supple, full passive range of motion [No Palpable Masses] : no palpable masses [Symmetric Chest Rise] : symmetric chest rise [Clear to Auscultation Bilaterally] : clear to auscultation bilaterally [Normoactive Precordium] : normoactive precordium [Regular Rate and Rhythm] : regular rate and rhythm [Normal S1, S2 present] : normal S1, S2 present [No Murmurs] : no murmurs [+2 Femoral Pulses] : +2 femoral pulses [Soft] : soft [NonTender] : non tender [Non Distended] : non distended [Normoactive Bowel Sounds] : normoactive bowel sounds [No Hepatomegaly] : no hepatomegaly [No Splenomegaly] : no splenomegaly [Jasper 1] : Jasper 1 [Central Urethral Opening] : central urethral opening [Patent] : patent [Testicles Descended Bilaterally] : testicles descended bilaterally [Normally Placed] : normally placed [No Abnormal Lymph Nodes Palpated] : no abnormal lymph nodes palpated [Symmetric Buttocks Creases] : symmetric buttocks creases [Symmetric Hip Rotation] : symmetric hip rotation [No Gait Asymmetry] : no gait asymmetry [No pain or deformities with palpation of bone, muscles, joints] : no pain or deformities with palpation of bone, muscles, joints [Normal Muscle Tone] : normal muscle tone [No Spinal Dimple] : no spinal dimple [NoTuft of Hair] : no tuft of hair [+2 Patella DTR] : +2 patella DTR [Straight] : straight [Cranial Nerves Grossly Intact] : cranial nerves grossly intact [No Rash or Lesions] : no rash or lesions

## 2023-09-14 NOTE — BH SOCIAL WORK INITIAL PSYCHOSOCIAL EVALUATION - NSBHFINANCECOPAY_PSY_ALL_CORE
Nikki Morales is status post bilateral upper eyelid blepharoplasty  and bilateral levator advancement ptosis repair.  Incision(s) healing well.  The lid(s)  are  in excellent position. She notices improvement in peripheral vision. She is noticing a difference in tarsal platform show. A bit more fulness Right upper lid, I suspect still edema. Will re-evaluate at 2 month postop.     I have recommended:  * Continue antibiotic ointment or bland lubricating ointment (eg vaseline or aquaphor) to the incision site BID.  * Massage along the incision 2-3 x daily.   * Warm soaks 3-4x daily until all edema and ecchymoses resolve  * Return to clinic in 2 months     Attending Physician Attestation:  Complete documentation of historical and exam elements from today's encounter can be found in the full encounter summary report (not reduplicated in this progress note).  I personally obtained the chief complaint(s) and history of present illness.  I confirmed and edited as necessary the review of systems, past medical/surgical history, family history, social history, and examination findings as documented by others; and I examined the patient myself.  I personally reviewed the relevant tests, images, and reports as documented above.  I formulated and edited as necessary the assessment and plan and discussed the findings and management plan with the patient and family. - Martina Andrade MD    
Unknown

## 2023-09-14 NOTE — PSYCHIATRIC REHAB INITIAL EVALUATION - NSBHALCSUBTREAT_PSY_ALL_CORE
As per chart. pt has psychiatric history of  schizophrenia, not in current treatment, previously inpatient [facility] resident in 2004.

## 2023-09-14 NOTE — BH INPATIENT PSYCHIATRY ASSESSMENT NOTE - NSBHCHARTREVIEWVS_PSY_A_CORE FT
Vital Signs Last 24 Hrs  T(C): 35.9 (09-14-23 @ 07:37), Max: 37 (09-13-23 @ 12:20)  T(F): 96.6 (09-14-23 @ 07:37), Max: 98.6 (09-13-23 @ 12:20)  HR: 93 (09-14-23 @ 07:37) (93 - 106)  BP: 122/67 (09-14-23 @ 07:37) (122/67 - 171/99)  BP(mean): --  RR: 18 (09-13-23 @ 16:12) (17 - 18)  SpO2: 98% (09-13-23 @ 15:19) (97% - 98%)    Orthostatic VS  09-13-23 @ 16:12  Lying BP: --/-- HR: --  Sitting BP: 153/98 HR: 105  Standing BP: 140/93 HR: 119  Site: upper left arm  Mode: electronic   Vital Signs Last 24 Hrs  T(C): 36.5 (09-15-23 @ 08:09), Max: 36.5 (09-15-23 @ 07:59)  T(F): 97.7 (09-15-23 @ 08:09), Max: 97.7 (09-15-23 @ 07:59)  HR: 92 (09-15-23 @ 08:09) (92 - 92)  BP: 121/73 (09-15-23 @ 08:09) (121/73 - 121/73)  BP(mean): --  RR: --  SpO2: --    Orthostatic VS  09-13-23 @ 16:12  Lying BP: --/-- HR: --  Sitting BP: 153/98 HR: 105  Standing BP: 140/93 HR: 119  Site: upper left arm  Mode: electronic

## 2023-09-15 PROCEDURE — 99232 SBSQ HOSP IP/OBS MODERATE 35: CPT

## 2023-09-15 NOTE — BH INPATIENT PSYCHIATRY PROGRESS NOTE - NSBHASSESSSUMMFT_PSY_ALL_CORE
58F,  PPH of schizophrenia, not in current treatment, previously inpatient [place] resident in 2004, denies prior suicide attempt, denies substance use history, denies violence/legal issues, no known PMH, brought in by EMS; activated by daughter for bizarre behavior and worsening psychotic symptoms    9/15 Update  Patient uncooperative, irritable, paranoid and agitated on examination  With thought disorder, Grandiose and Mu-ism delusions, AH and hx of being unable to care for self secondary to psychosis  Refusing all meds and will pursue TOO  Impression  Exacerbation of schizophrenia in the context of noncompliance    PLAN  Patient requires acute inpatient care for treatment of psychosis.   Patient admitted on a 939 emergency status 9/13 and converted to 927 on 9/15 for the purpose of TOO   Patient does not require constant observation at this time and will follow with  15 minute checks.   Patient will be started on SGA trial with risperidone with plan for transition to Paliperidone JUAN  Patient denies having illness and states she will refuse all treatment and have started process for TOO  Treatment will include individual therapy/supportive therapy/ rehab therapy/ psychopharmacological therapy and milieu therapy

## 2023-09-15 NOTE — BH INPATIENT PSYCHIATRY PROGRESS NOTE - NSBHMETABOLIC_PSY_ALL_CORE_FT
BMI:   HbA1c:   Glucose:   BP: 121/73 (09-15-23 @ 08:09) (121/56 - 193/108)  Lipid Panel: Date/Time: 09-14-23 @ 09:30  Cholesterol, Serum: 229  Direct LDL: --  HDL Cholesterol, Serum: 62  Total Cholesterol/HDL Ration Measurement: --  Triglycerides, Serum: 96

## 2023-09-15 NOTE — BH INPATIENT PSYCHIATRY PROGRESS NOTE - PRN MEDS
MEDICATIONS  (PRN):  haloperidol     Tablet 5 milliGRAM(s) Oral every 4 hours PRN agitation  haloperidol    Injectable 5 milliGRAM(s) IntraMuscular Once PRN agitation  LORazepam     Tablet 2 milliGRAM(s) Oral every 6 hours PRN Agitation  LORazepam   Injectable 2 milliGRAM(s) IntraMuscular Once PRN Agitation

## 2023-09-15 NOTE — BH INPATIENT PSYCHIATRY PROGRESS NOTE - MSE UNSTRUCTURED FT
On exam today the patient is irritable and uncooperative with questions.    Speech is loud, rapid and pressured.  Thought process: with disorder of thought process.    Thought content: with paranoid and Mandaen delusional beliefs.   Affect: labile from flat to angry  Patient refuses to answer questions about Suicide, homicide, perceptual disturbances, Mood, or Memory  Patient is Alert and oriented   Insight and judgment are impaired. Impulse control is tenuous at this time

## 2023-09-15 NOTE — BH INPATIENT PSYCHIATRY PROGRESS NOTE - NSBHFUPINTERVALHXFT_PSY_A_CORE
Patient seen for follow up of psychosis  Chart reviewed and case discussed with treatment team  Patient has been refusing all medication  She is irritable and angry on approach and refuses to participate in any interview or examination  Becomes enraged when her admission circumstances are discussed

## 2023-09-15 NOTE — BH INPATIENT PSYCHIATRY PROGRESS NOTE - NSBHCHARTREVIEWVS_PSY_A_CORE FT
Vital Signs Last 24 Hrs  T(C): 36.5 (09-15-23 @ 08:09), Max: 36.5 (09-15-23 @ 07:59)  T(F): 97.7 (09-15-23 @ 08:09), Max: 97.7 (09-15-23 @ 07:59)  HR: 92 (09-15-23 @ 08:09) (92 - 92)  BP: 121/73 (09-15-23 @ 08:09) (121/73 - 121/73)  BP(mean): --  RR: --  SpO2: --    Orthostatic VS  09-13-23 @ 16:12  Lying BP: --/-- HR: --  Sitting BP: 153/98 HR: 105  Standing BP: 140/93 HR: 119  Site: upper left arm  Mode: electronic

## 2023-09-16 LAB
CULTURE RESULTS: SIGNIFICANT CHANGE UP
SPECIMEN SOURCE: SIGNIFICANT CHANGE UP

## 2023-09-16 PROCEDURE — 99232 SBSQ HOSP IP/OBS MODERATE 35: CPT

## 2023-09-16 NOTE — BH INPATIENT PSYCHIATRY PROGRESS NOTE - NSBHFUPINTERVALHXFT_PSY_A_CORE
Patient seen for follow up of psychosis  Chart reviewed and case discussed with nursing team  Patient has been refusing all medication  She is irritable and angry on approach and refuses to participate in any interview or examination  Becomes enraged when her admission circumstances are discussed

## 2023-09-16 NOTE — BH INPATIENT PSYCHIATRY PROGRESS NOTE - NSBHMETABOLIC_PSY_ALL_CORE_FT
BMI:   HbA1c:   Glucose:   BP: 131/77 (09-16-23 @ 08:27) (121/73 - 171/99)  Lipid Panel: Date/Time: 09-14-23 @ 09:30  Cholesterol, Serum: 229  Direct LDL: --  HDL Cholesterol, Serum: 62  Total Cholesterol/HDL Ration Measurement: --  Triglycerides, Serum: 96

## 2023-09-16 NOTE — BH INPATIENT PSYCHIATRY PROGRESS NOTE - MSE UNSTRUCTURED FT
On exam today the patient is irritable and uncooperative with questions.    Speech is loud, rapid and pressured.  Thought process: with disorder of thought process.    Thought content: with paranoid and Jain delusional beliefs.   Affect: labile from flat to angry  Patient refuses to answer questions about Suicide, homicide, perceptual disturbances, Mood, or Memory  Patient is Alert and oriented   Insight and judgment are impaired. Impulse control is tenuous at this time

## 2023-09-16 NOTE — BH INPATIENT PSYCHIATRY PROGRESS NOTE - NSBHCHARTREVIEWVS_PSY_A_CORE FT
Vital Signs Last 24 Hrs  T(C): 36 (09-16-23 @ 08:27), Max: 36 (09-16-23 @ 08:27)  T(F): 96.8 (09-16-23 @ 08:27), Max: 96.8 (09-16-23 @ 08:27)  HR: 79 (09-16-23 @ 08:27) (79 - 79)  BP: 131/77 (09-16-23 @ 08:27) (131/77 - 131/77)  BP(mean): --  RR: --  SpO2: --

## 2023-09-16 NOTE — BH INPATIENT PSYCHIATRY PROGRESS NOTE - NSBHASSESSSUMMFT_PSY_ALL_CORE
58F,  PPH of schizophrenia, not in current treatment, previously inpatient [place] resident in 2004, denies prior suicide attempt, denies substance use history, denies violence/legal issues, no known PMH, brought in by EMS; activated by daughter for bizarre behavior and worsening psychotic symptoms    9/16 Update  Patient uncooperative, irritable, paranoid and agitated on examination  With thought disorder, Grandiose and Mormon delusions, AH and hx of being unable to care for self secondary to psychosis  Refusing all meds and will pursue TOO  Impression  Exacerbation of schizophrenia in the context of noncompliance    PLAN  Patient requires acute inpatient care for treatment of psychosis.   Patient admitted on a 9.39 emergency status 9/13 and converted to 927 on 9/15 for the purpose of TOO   Patient does not require constant observation at this time and will follow with  15 minute checks.   Patient will be started on SGA trial with risperidone with plan for transition to Paliperidone JUAN  Patient denies having illness and states she will refuse all treatment and have started process for TOO  Treatment will include individual therapy/supportive therapy/ rehab therapy/ psychopharmacological therapy and milieu therapy

## 2023-09-17 PROCEDURE — 99231 SBSQ HOSP IP/OBS SF/LOW 25: CPT

## 2023-09-17 NOTE — BH INPATIENT PSYCHIATRY PROGRESS NOTE - NSBHMETABOLIC_PSY_ALL_CORE_FT
BMI:   HbA1c:   Glucose:   BP: 149/83 (09-17-23 @ 08:19) (121/73 - 149/83)  Lipid Panel: Date/Time: 09-14-23 @ 09:30  Cholesterol, Serum: 229  Direct LDL: --  HDL Cholesterol, Serum: 62  Total Cholesterol/HDL Ration Measurement: --  Triglycerides, Serum: 96

## 2023-09-17 NOTE — BH INPATIENT PSYCHIATRY PROGRESS NOTE - NSBHCHARTREVIEWVS_PSY_A_CORE FT
Vital Signs Last 24 Hrs  T(C): 36.1 (09-17-23 @ 08:19), Max: 36.1 (09-17-23 @ 08:19)  T(F): 97 (09-17-23 @ 08:19), Max: 97 (09-17-23 @ 08:19)  HR: 79 (09-17-23 @ 08:19) (79 - 79)  BP: 149/83 (09-17-23 @ 08:19) (149/83 - 149/83)  BP(mean): --  RR: --  SpO2: --

## 2023-09-17 NOTE — BH INPATIENT PSYCHIATRY PROGRESS NOTE - NSBHASSESSSUMMFT_PSY_ALL_CORE
58F,  PPH of schizophrenia, not in current treatment, previously inpatient [place] resident in 2004, denies prior suicide attempt, denies substance use history, denies violence/legal issues, no known PMH, brought in by EMS; activated by daughter for bizarre behavior and worsening psychotic symptoms    9/17 Update  Patient uncooperative, irritable, paranoid and agitated on examination  With thought disorder, Grandiose and Confucianist delusions, AH and hx of being unable to care for self secondary to psychosis  Refusing all meds and will pursue TOO  Impression  Exacerbation of schizophrenia in the context of noncompliance    PLAN  Patient requires acute inpatient care for treatment of psychosis.   Patient admitted on a 9.39 emergency status 9/13 and converted to 927 on 9/15 for the purpose of TOO   Patient does not require constant observation at this time and will follow with  15 minute checks.   Patient will be started on SGA trial with risperidone with plan for transition to Paliperidone JUAN  Patient denies having illness and states she will refuse all treatment and have started process for TOO  Treatment will include individual therapy/supportive therapy/ rehab therapy/ psychopharmacological therapy and milieu therapy

## 2023-09-17 NOTE — BH INPATIENT PSYCHIATRY PROGRESS NOTE - MSE UNSTRUCTURED FT
On exam today the patient is irritable and uncooperative with questions.    Speech is loud, rapid and pressured.  Thought process: with disorder of thought process.    Thought content: with paranoid and Samaritan delusional beliefs.   Affect: labile from flat to angry  Patient refuses to answer questions about Suicide, homicide, perceptual disturbances, Mood, or Memory  Patient is Alert and oriented   Insight and judgment are impaired. Impulse control is tenuous at this time

## 2023-09-18 PROCEDURE — 99232 SBSQ HOSP IP/OBS MODERATE 35: CPT

## 2023-09-18 NOTE — BH INPATIENT PSYCHIATRY PROGRESS NOTE - MSE UNSTRUCTURED FT
On exam today the patient is irritable and uncooperative with questions.    Speech is loud, rapid and pressured.  Thought process: with disorder of thought process.    Thought content: with paranoid and Rastafari delusional beliefs.   Affect: labile from flat to angry  Patient refuses to answer questions about Suicide, homicide, perceptual disturbances, Mood, or Memory  Patient is Alert and oriented   Insight and judgment are impaired. Impulse control is tenuous at this time

## 2023-09-18 NOTE — BH INPATIENT PSYCHIATRY PROGRESS NOTE - NSBHMETABOLIC_PSY_ALL_CORE_FT
BMI:   HbA1c:   Glucose:   BP: 122/58 (09-18-23 @ 06:34) (122/58 - 149/83)  Lipid Panel: Date/Time: 09-14-23 @ 09:30  Cholesterol, Serum: 229  Direct LDL: --  HDL Cholesterol, Serum: 62  Total Cholesterol/HDL Ration Measurement: --  Triglycerides, Serum: 96

## 2023-09-18 NOTE — BH INPATIENT PSYCHIATRY PROGRESS NOTE - NSBHCHARTREVIEWVS_PSY_A_CORE FT
Vital Signs Last 24 Hrs  T(C): --  T(F): --  HR: 85 (09-18-23 @ 06:34) (85 - 85)  BP: 122/58 (09-18-23 @ 06:34) (122/58 - 122/58)  BP(mean): --  RR: --  SpO2: --

## 2023-09-18 NOTE — BH INPATIENT PSYCHIATRY PROGRESS NOTE - NSBHFUPINTERVALHXFT_PSY_A_CORE
Patient seen for follow up of psychosis  Chart reviewed and case discussed with treatment team  Patient has been refusing all medication  She is irritable and angry on approach and refuses to participate in any interview or examination

## 2023-09-18 NOTE — BH INPATIENT PSYCHIATRY PROGRESS NOTE - NSBHASSESSSUMMFT_PSY_ALL_CORE
58F,  PPH of schizophrenia, not in current treatment, previously inpatient [place] resident in 2004, denies prior suicide attempt, denies substance use history, denies violence/legal issues, no known PMH, brought in by EMS; activated by daughter for bizarre behavior and worsening psychotic symptoms    9/18 Update  Patient with minimal clinical change  She remains uncooperative, irritable, paranoid and agitated on examination demanding staff not talk with her but "talk to Lse"  With thought disorder, Grandiose and Episcopal delusions, AH and hx of being unable to care for self secondary to psychosis  Refusing all meds and will pursue TOO  Administrative meeting for TOO 9/20    Impression  Exacerbation of schizophrenia in the context of noncompliance    PLAN  Patient requires acute inpatient care for treatment of psychosis.   Patient admitted on a 939 emergency status 9/13 and converted to 927 on 9/15 for the purpose of TOO   Patient does not require constant observation at this time and will follow with  15 minute checks.   Patient will be started on SGA trial with risperidone with plan for transition to Paliperidone JUAN  Patient denies having illness and states she will refuse all treatment and have started process for TOO  Treatment will include individual therapy/supportive therapy/ rehab therapy/ psychopharmacological therapy and milieu therapy

## 2023-09-19 PROCEDURE — 99231 SBSQ HOSP IP/OBS SF/LOW 25: CPT

## 2023-09-19 NOTE — BH INPATIENT PSYCHIATRY PROGRESS NOTE - NSBHFUPINTERVALHXFT_PSY_A_CORE
Patient seen for follow up of psychosis  Chart reviewed and case discussed with treatment team  Patient screaming at team today on approach  She has continued to refuse all medication  She is irritable and angry on approach and refuses to participate in any interview or examination

## 2023-09-19 NOTE — BH INPATIENT PSYCHIATRY PROGRESS NOTE - MSE UNSTRUCTURED FT
On exam today the patient is irritable and uncooperative with questions and screaming at writer  Speech is loud, rapid and pressured.  Thought process: with disorder of thought process.    Thought content: with paranoid and Sabianism delusional beliefs.   Affect: labile from flat to angry  Patient refuses to answer questions about Suicide, homicide, perceptual disturbances, Mood, or Memory  Patient is Alert and oriented   Insight and judgment are impaired. Impulse control is tenuous at this time

## 2023-09-19 NOTE — BH INPATIENT PSYCHIATRY PROGRESS NOTE - NSBHCHARTREVIEWVS_PSY_A_CORE FT
Vital Signs Last 24 Hrs  T(C): 36.1 (09-19-23 @ 07:41), Max: 36.1 (09-19-23 @ 07:41)  T(F): 96.9 (09-19-23 @ 07:41), Max: 96.9 (09-19-23 @ 07:41)  HR: 89 (09-19-23 @ 07:41) (89 - 89)  BP: 120/70 (09-19-23 @ 07:41) (120/70 - 120/70)  BP(mean): --  RR: --  SpO2: --

## 2023-09-19 NOTE — BH INPATIENT PSYCHIATRY PROGRESS NOTE - NSBHMETABOLIC_PSY_ALL_CORE_FT
BMI:   HbA1c:   Glucose:   BP: 120/70 (09-19-23 @ 07:41) (120/70 - 149/83)  Lipid Panel: Date/Time: 09-14-23 @ 09:30  Cholesterol, Serum: 229  Direct LDL: --  HDL Cholesterol, Serum: 62  Total Cholesterol/HDL Ration Measurement: --  Triglycerides, Serum: 96

## 2023-09-19 NOTE — BH INPATIENT PSYCHIATRY PROGRESS NOTE - NSBHASSESSSUMMFT_PSY_ALL_CORE
58F,  PPH of schizophrenia, not in current treatment, previously inpatient [place] resident in 2004, denies prior suicide attempt, denies substance use history, denies violence/legal issues, no known PMH, brought in by EMS; activated by daughter for bizarre behavior and worsening psychotic symptoms    9/19 Update  Patient more angry and irritable today   She remains uncooperative, irritable, paranoid and agitated on examination demanding staff leave her room and "talk to Les"  With thought disorder, Grandiose and Yazidism delusions, AH and hx of being unable to care for self secondary to psychosis  Refusing all meds and will pursue TOO  Administrative meeting for TOO 9/20    Impression  Exacerbation of schizophrenia in the context of noncompliance    PLAN  Patient requires acute inpatient care for treatment of psychosis.   Patient admitted on a 939 emergency status 9/13 and converted to 927 on 9/15 for the purpose of TOO   Patient does not require constant observation at this time and will follow with  15 minute checks.   Patient will be started on SGA trial with risperidone with plan for transition to Paliperidone JUAN  Patient denies having illness and states she will refuse all treatment and have started process for TOO  Treatment will include individual therapy/supportive therapy/ rehab therapy/ psychopharmacological therapy and milieu therapy

## 2023-09-20 PROCEDURE — 99232 SBSQ HOSP IP/OBS MODERATE 35: CPT

## 2023-09-20 NOTE — BH INPATIENT PSYCHIATRY PROGRESS NOTE - NSBHCHARTREVIEWVS_PSY_A_CORE FT
Vital Signs Last 24 Hrs  T(C): 35.6 (09-20-23 @ 06:20), Max: 35.6 (09-20-23 @ 06:20)  T(F): 96 (09-20-23 @ 06:20), Max: 96 (09-20-23 @ 06:20)  HR: 73 (09-20-23 @ 06:20) (73 - 73)  BP: 141/74 (09-20-23 @ 06:20) (141/74 - 141/74)  BP(mean): --  RR: --  SpO2: --

## 2023-09-20 NOTE — BH INPATIENT PSYCHIATRY PROGRESS NOTE - NSBHASSESSSUMMFT_PSY_ALL_CORE
58F,  PPH of schizophrenia, not in current treatment, previously inpatient [place] resident in 2004, denies prior suicide attempt, denies substance use history, denies violence/legal issues, no known PMH, brought in by EMS; activated by daughter for bizarre behavior and worsening psychotic symptoms    9/20 Update  Patient more angry and irritable the last few days  She remains uncooperative, irritable, paranoid and agitated on examination demanding staff leave her room and "talk to Les"  With thought disorder, Grandiose and Christian delusions, AH and hx of being unable to care for self secondary to psychosis  Refusing all meds and will pursue TOO  Administrative meeting  today for TOO 9/20    Impression  Exacerbation of schizophrenia in the context of noncompliance    PLAN  Patient requires acute inpatient care for treatment of psychosis.   Patient admitted on a 939 emergency status 9/13 and converted to 927 on 9/15 for the purpose of TOO   Patient does not require constant observation at this time and will follow with  15 minute checks.   Patient will be started on SGA trial with risperidone with plan for transition to Paliperidone JUAN  Patient denies having illness and states she will refuse all treatment and have started process for TOO  Treatment will include individual therapy/supportive therapy/ rehab therapy/ psychopharmacological therapy and milieu therapy

## 2023-09-20 NOTE — BH CHART NOTE - NSEVENTNOTEFT_PSY_ALL_CORE
DIRECTOR OF INPATIENT PSYCHIATRY NOTE:  I have evaluated the patient’s need for medication over her objection in the presence of the [facility]  Mr. Scooter Nash, the risks and benefits of medication, and her ability to make a reasoned decision.      Briefly, the pt is a 58F,  PPH of schizophrenia, not in current treatment, previously inpatient [place] resident in 2004, denies prior suicide attempt, denies substance use history, denies violence/legal issues, no known PMH, brought in by EMS; activated by daughter for bizarre behavior and worsening psychotic symptoms      On evaluation, the pt was found in her room though easily agitated and started yelling at this writer and Mr. Nash.  Refused to have the meeting.  She has been prescribed Risperdal        She has not been taking medications as prescribed. She does not understand the extent of her illness.    It is my opinion that this patient currently experiences psychotic symptoms that are severely impacting her safety and ability to care for herself, and would likely benefit from antipsychotic medications.  Furthermore, it is my opinion that she lacks capacity to refuse antipsychotic therapy.  I have informed the patient of my decision and that unless she withdraws her objection to taking medications, we will make application to court for authorization to treat her over her objection. I have notified the patient and [facility] of this decision by letter.

## 2023-09-20 NOTE — BH INPATIENT PSYCHIATRY PROGRESS NOTE - NSBHMETABOLIC_PSY_ALL_CORE_FT
BMI:   HbA1c:   Glucose:   BP: 141/74 (09-20-23 @ 06:20) (120/70 - 149/83)  Lipid Panel: Date/Time: 09-14-23 @ 09:30  Cholesterol, Serum: 229  Direct LDL: --  HDL Cholesterol, Serum: 62  Total Cholesterol/HDL Ration Measurement: --  Triglycerides, Serum: 96

## 2023-09-20 NOTE — BH INPATIENT PSYCHIATRY PROGRESS NOTE - MSE UNSTRUCTURED FT
On exam today the patient remains with no clinical change   She is irritable and uncooperative with questions and screaming at writer  Speech is loud, rapid and pressured.  Thought process: with disorder of thought process.    Thought content: with paranoid and Anabaptism delusional beliefs.   Affect: labile from flat to angry  Patient refuses to answer questions about Suicide, homicide, perceptual disturbances, Mood, or Memory  Patient is Alert and oriented   Insight and judgment are impaired. Impulse control is tenuous at this time

## 2023-09-20 NOTE — BH INPATIENT PSYCHIATRY PROGRESS NOTE - NSBHFUPINTERVALHXFT_PSY_A_CORE
Patient seen for follow up of psychosis  Chart reviewed and case discussed with treatment team  Patient screaming at team today on approach  She has continued to refuse all medication  She is irritable and angry on approach and refuses to participate in any interview or examination  Administrative meeting for TOO today with MHLS and Inpatient Director

## 2023-09-21 PROCEDURE — 99231 SBSQ HOSP IP/OBS SF/LOW 25: CPT

## 2023-09-21 NOTE — BH INPATIENT PSYCHIATRY PROGRESS NOTE - MSE UNSTRUCTURED FT
On exam today the patient remains with no clinical change   She is irritable and uncooperative with questions and screaming at writer  Speech is loud, rapid and pressured.  Thought process: with disorder of thought process.    Thought content: with paranoid and Mandaen delusional beliefs.   Affect: labile from flat to angry  Patient refuses to answer questions about Suicide, homicide, perceptual disturbances, Mood, or Memory  Patient is Alert and oriented   Insight and judgment are impaired. Impulse control is tenuous at this time

## 2023-09-21 NOTE — BH INPATIENT PSYCHIATRY PROGRESS NOTE - PRN MEDS
MEDICATIONS  (PRN):  haloperidol     Tablet 5 milliGRAM(s) Oral every 4 hours PRN agitation  haloperidol    Injectable 5 milliGRAM(s) IntraMuscular Once PRN agitation

## 2023-09-21 NOTE — BH INPATIENT PSYCHIATRY PROGRESS NOTE - NSBHASSESSSUMMFT_PSY_ALL_CORE
[facility]  Pediatric Cardiology  Progress Note    Patient Name: Yousif Cortes  MRN: [id number]  Admission Date: 2018  Hospital Length of Stay: 12 days  Code Status: Full Code   Attending Physician: Chucho Simon MD   Primary Care Physician: Primary Doctor No  Expected Discharge Date: 2018  Principal Problem:Coarctation of aorta    Subjective:     Interval History: No acute issues overnight. He took almost all of feeds PO with exception of 1 that required 20 cc gavage. Lost IV access so lasix transitioned to PO. Continues with some loose stools.     Objective:     Vital Signs (Most Recent):  Temp: 98.1 °F (36.7 °C) (09/21/18 0416)  Pulse: 108 (09/21/18 0747)  Resp: 46 (09/21/18 0416)  BP: (!) 127/69 (09/21/18 0416)  SpO2: 96 % (09/21/18 0747) Vital Signs (24h Range):  Temp:  [97.2 °F (36.2 °C)-98.1 °F (36.7 °C)] 98.1 °F (36.7 °C)  Pulse:  [] 108  Resp:  [38-53] 46  SpO2:  [79 %-100 %] 96 %  BP: ()/(35-69) 127/69     Weight: 4.04 kg (8 lb 14.5 oz)  Body mass index is 14.37 kg/m².     SpO2: 96 %  O2 Device (Oxygen Therapy): room air    Intake/Output - Last 3 Shifts       09/19 0700 - 09/20 0659 09/20 0700 - 09/21 0659 09/21 0700 - 09/22 0659    P.O. 293 448     I.V. (mL/kg) 32 (7.2)      NG/GT  28     IV Piggyback       Total Intake(mL/kg) 325 (73.4) 476 (117.8)     Urine (mL/kg/hr) 189 (1.8) 63 (0.6)     Other  172     Stool 0 0     Total Output 189 235     Net +136 +241            Stool Occurrence 6 x 3 x           Lines/Drains/Airways     Drain                 NG/OG Tube 09/20/18 1130 Cortrak 6 Fr. Right nostril less than 1 day                Scheduled Medications:    acetaminophen  10 mg/kg (Dosing Weight) Oral Q4H    enoxaparin  1.5 mg/kg (Dosing Weight) Subcutaneous Q12H    esomeprazole magnesium  5 mg Oral Before breakfast    famotidine  0.5 mg/kg (Dosing Weight) Oral BID    furosemide  1 mg/kg (Dosing Weight) Oral Q12H    propranolol  1 mg/kg (Dosing  Weight) Oral Q6H       Continuous Medications:       PRN Medications: glycerin (laxative) Soln (Pedia-Lax), simethicone      Physical Exam  Constitutional: He appears well-developed and well-nourished. Acyanotic.  HENT:   Head: Normocephalic and atraumatic. Anterior fontanelle is flat. No cranial deformity or facial anomaly.   Mouth/Throat: Mucous membranes are moist.   Eyes: Conjunctivae are normal.   Neck: Neck supple.   Cardiovascular: Regular rhythm, S1 normal and S2 normal. Pulses are strong. There is a 1/6 holosystolic murmur heard best at the LUSB.  Pulses:       Radial pulses are 2+ on the right side, and 2+ on the left side.        Femoral pulses are 1+ on the right side, and 2+ on the left side.  Pulmonary/Chest: Normal air entry bilaterally, no crackles. No respiratory distress. Transmitted upper airway noises.   Abdominal: Soft. He exhibits no distension. Liver 1-2 cm below the RCM.    Extremities: Bilateral legs are warm.   Neurological: Moves all extremities equally.  Skin: No rash.        Significant Labs:      BMP  Lab Results   Component Value Date     2018    K 5.1 2018     2018    CO2 22 (L) 2018    BUN 11 2018    CREATININE 0.4 (L) 2018    CALCIUM 10.6 (H) 2018    ANIONGAP 13 2018    ESTGFRAFRICA SEE COMMENT 2018    EGFRNONAA SEE COMMENT 2018     Lab Results   Component Value Date    ALT 23 2018    AST 33 2018    ALKPHOS 136 2018    BILITOT 0.7 2018       Significant Imaging:     I personally reviewed the following:     CXR stable without edema.     RLE arterial US:  Extension of right CFA occlusive thrombus proximal into the right iliac artery with continued slow flow in the distal right low extremity.  Left CFA is patent.    Echo (9/18):  Hypoplastic aortic arch, coarctation of the aorta, posteriorly malaligned ventricular septal defect and atrial septal defect.  - s/p aortic arch pull-up/end to end  anastomosis, closure of atrial and ventricular septal defect (9/12/18).  Intact atrial septum.  Thickened right ventricle free wall, mild.  Qualitative impression of borderline normal right ventricular systolic function.  Trivial residual ventricular septal defect with left to right shunting.>3.8 m/sec.  Tunnel-like mid muscular VSD with small estimated left to right shunt noted by color doppler..  Large pulmonic valve annulus.  Prominent pulmonary venous return from left lower pulmonary vein noted in subxiphoid imaging.  Normal left ventricle structure and size.  Abnormal septal motion with good movement of LV free wall, SF measured 37% with qualitative impression of low normal LV systolic function.  Trileaflet aortic valve with restricted systolic movement of right coronary cusp in short axis views.  Trivial aortic valve insufficiency. Normal aortic valve velocity.   Normal size aorta. No evidence of narrowing at site of end to end anastomosis with normal velocity  across descending aorta.  No pericardial effusion.      Assessment and Plan:     Cardiac/Vascular   * Coarctation of aorta    Yousif Cortes is a 7 wk.o.  male with:   1.Coarctation of the aorta and posteriorly malaligned VSD  - s/p aortic arch advancement with extended end-to-end anastamosis  - trivial residual VSD  - junctional rhythm post-op day 1   2. Noisy breathing - mild laryngomalacia noted 9/10  3. Slow atrial tachycardia with intermittent PACs 9/15/18 requiring esmolol, transitioned to propranolol (9/16). Accelerated ventricular rhythm, resolved.  4. Right femoral artery thrombus (9/14/18)  5. GERD    Plan:  Neuro:   - Scheduled PO tylenol  - HUS normal  Resp:   - Goal sat normal, >92%.   - CXR daily  CVS:   - Propranolol 1 mg/kg PO q6  - Continue Lasix PO q12   FEN/GI:   - Alimentum 22 kcal/oz, 60 ml q3 PO/Gavage. May be able to take out NG soon.   - Will add culturelle. If diarrhea persists can consider transition to [place].   - Monitor  electrolytes q Mon/Thu  - GI prophylaxis: PO famotidine and pantoprazole  Heme/ID:  - Goal Hct >30  - s/p Ancef prophylaxis  - Lovenox for R femoral artery thrombus, repeat US today showed larger thrombus. Will continue anti-coagulation and follow up Anti-Xa.   Lines:  - None  Dispo:  - Monitor feeding progression.             AMANDA Ayala  Pediatric Cardiology  [facility]     58F,  PPH of schizophrenia, not in current treatment, previously inpatient [place] resident in 2004, denies prior suicide attempt, denies substance use history, denies violence/legal issues, no known PMH, brought in by EMS; activated by daughter for bizarre behavior and worsening psychotic symptoms    9/21 Update  Patient more angry and irritable   She remains uncooperative, irritable, paranoid and agitated on examination demanding staff leave her room and "talk to Les"  With thought disorder, Grandiose and Druze delusions, AH and hx of being unable to care for self secondary to psychosis  Refusing all meds and will pursue TOO  Administrative meeting  for TOO 9/20 and for Court Hearing 9/26    Impression  Exacerbation of schizophrenia in the context of noncompliance    PLAN  Patient requires acute inpatient care for treatment of psychosis.   Patient admitted on a 939 emergency status 9/13 and converted to 927 on 9/15 for the purpose of TOO  Administrative meeting  for TOO 9/20 and for Court Hearing 9/26   Patient does not require constant observation at this time and will follow with  15 minute checks.   Patient will be started on SGA trial with risperidone with plan for transition to Paliperidone JUNA  Treatment will include individual therapy/supportive therapy/ rehab therapy/ psychopharmacological therapy and milieu therapy

## 2023-09-21 NOTE — BH INPATIENT PSYCHIATRY PROGRESS NOTE - CURRENT MEDICATION
MEDICATIONS  (STANDING):  amLODIPine   Tablet 5 milliGRAM(s) Oral once  risperiDONE   Tablet 1 milliGRAM(s) Oral every 12 hours    MEDICATIONS  (PRN):  haloperidol     Tablet 5 milliGRAM(s) Oral every 4 hours PRN agitation  haloperidol    Injectable 5 milliGRAM(s) IntraMuscular Once PRN agitation

## 2023-09-21 NOTE — BH INPATIENT PSYCHIATRY PROGRESS NOTE - NSBHFUPINTERVALHXFT_PSY_A_CORE
Patient seen for follow up of psychosis  Chart reviewed and case discussed with treatment team  Patient screaming at team today on approach  She has continued to refuse all medication  She is irritable and angry on approach and refuses to participate in any interview or examination  Administrative meeting for TOO yesterday with MHLS and Inpatient Director approved and for Court 9/26

## 2023-09-21 NOTE — BH INPATIENT PSYCHIATRY PROGRESS NOTE - NSBHMETABOLIC_PSY_ALL_CORE_FT
BMI:   HbA1c:   Glucose:   BP: 141/74 (09-20-23 @ 06:20) (120/70 - 141/74)  Lipid Panel: Date/Time: 09-14-23 @ 09:30  Cholesterol, Serum: 229  Direct LDL: --  HDL Cholesterol, Serum: 62  Total Cholesterol/HDL Ration Measurement: --  Triglycerides, Serum: 96

## 2023-09-22 PROCEDURE — 99231 SBSQ HOSP IP/OBS SF/LOW 25: CPT

## 2023-09-22 NOTE — BH INPATIENT PSYCHIATRY PROGRESS NOTE - NSBHASSESSSUMMFT_PSY_ALL_CORE
58F,  PPH of schizophrenia, not in current treatment, previously inpatient [place] resident in 2004, denies prior suicide attempt, denies substance use history, denies violence/legal issues, no known PMH, brought in by EMS; activated by daughter for bizarre behavior and worsening psychotic symptoms    9/22 Update  Patient remains uncooperative, irritable, paranoid and agitated on examination demanding staff leave her room and "talk to Les"  With thought disorder, Grandiose and Yazdanism delusions, AH and hx of being unable to care for self secondary to psychosis  Refusing all meds and will pursue TOO  Administrative meeting  for TOO 9/20 and for Court Hearing 9/26    Impression  Exacerbation of schizophrenia in the context of noncompliance    PLAN  Patient requires acute inpatient care for treatment of psychosis.   Patient admitted on a 939 emergency status 9/13 and converted to 927 on 9/15 for the purpose of TOO  Administrative meeting  for TOO 9/20 and for Court Hearing 9/26   Patient does not require constant observation at this time and will follow with  15 minute checks.   Patient will be started on SGA trial with risperidone with plan for transition to Paliperidone JUAN  Treatment will include individual therapy/supportive therapy/ rehab therapy/ psychopharmacological therapy and milieu therapy

## 2023-09-22 NOTE — BH INPATIENT PSYCHIATRY PROGRESS NOTE - MSE UNSTRUCTURED FT
On exam today the patient remains with no clinical change   She is irritable and uncooperative with questions and screaming at writer  Speech is loud, rapid and pressured.  Thought process: with disorder of thought process.    Thought content: with paranoid and Jain delusional beliefs.   Affect: labile from flat to angry  Patient refuses to answer questions about Suicide, homicide, perceptual disturbances, Mood, or Memory  Patient is Alert and oriented   Insight and judgment are impaired. Impulse control is tenuous at this time

## 2023-09-22 NOTE — BH INPATIENT PSYCHIATRY PROGRESS NOTE - NSBHFUPINTERVALHXFT_PSY_A_CORE
Patient seen for follow up of psychosis  Chart reviewed and case discussed with treatment team  Patient screaming at team again today on approach  She has continued to refuse all medication  She is irritable and angry on approach and refuses to participate in any interview or examination  Court for TOO scheduled for 9/26

## 2023-09-22 NOTE — BH INPATIENT PSYCHIATRY PROGRESS NOTE - NSBHMETABOLIC_PSY_ALL_CORE_FT
BMI:   HbA1c:   Glucose:   BP: 141/74 (09-20-23 @ 06:20) (141/74 - 141/74)  Lipid Panel: Date/Time: 09-14-23 @ 09:30  Cholesterol, Serum: 229  Direct LDL: --  HDL Cholesterol, Serum: 62  Total Cholesterol/HDL Ration Measurement: --  Triglycerides, Serum: 96

## 2023-09-23 PROCEDURE — 99231 SBSQ HOSP IP/OBS SF/LOW 25: CPT

## 2023-09-23 NOTE — BH INPATIENT PSYCHIATRY PROGRESS NOTE - NSBHASSESSSUMMFT_PSY_ALL_CORE
58F,  PPH of schizophrenia, not in current treatment, previously inpatient [place] resident in 2004, denies prior suicide attempt, denies substance use history, denies violence/legal issues, no known PMH, brought in by EMS; activated by daughter for bizarre behavior and worsening psychotic symptoms    9/23 Update  Patient remains uncooperative, irritable, paranoid and agitated on examination demanding staff leave her room and "talk to Les"  With thought disorder, Grandiose and Hindu delusions, AH and hx of being unable to care for self secondary to psychosis  Refusing all meds and will pursue TOO  Administrative meeting  for TOO 9/20 and for Court Hearing 9/26    Impression  Exacerbation of schizophrenia in the context of noncompliance    PLAN  Patient requires acute inpatient care for treatment of psychosis.   Patient admitted on a 939 emergency status 9/13 and converted to 927 on 9/15 for the purpose of TOO  Administrative meeting  for TOO 9/20 and for Court Hearing 9/26   Patient does not require constant observation at this time and will follow with  15 minute checks.   Patient will be started on SGA trial with risperidone with plan for transition to Paliperidone JUAN  Treatment will include individual therapy/supportive therapy/ rehab therapy/ psychopharmacological therapy and milieu therapy

## 2023-09-23 NOTE — BH INPATIENT PSYCHIATRY PROGRESS NOTE - NSBHMETABOLIC_PSY_ALL_CORE_FT
BMI:   HbA1c:   Glucose:   BP: --  Lipid Panel: Date/Time: 09-14-23 @ 09:30  Cholesterol, Serum: 229  Direct LDL: --  HDL Cholesterol, Serum: 62  Total Cholesterol/HDL Ration Measurement: --  Triglycerides, Serum: 96

## 2023-09-23 NOTE — BH INPATIENT PSYCHIATRY PROGRESS NOTE - MSE UNSTRUCTURED FT
On exam today the patient remains with no clinical change   She is irritable and uncooperative with questions and screaming at writer  Speech is loud, rapid and pressured.  Thought process: with disorder of thought process.    Thought content: with paranoid and Cheondoism delusional beliefs.   Affect: labile from flat to angry  Patient refuses to answer questions about Suicide, homicide, perceptual disturbances, Mood, or Memory  Patient is Alert and oriented   Insight and judgment are impaired. Impulse control is tenuous at this time

## 2023-09-23 NOTE — BH INPATIENT PSYCHIATRY PROGRESS NOTE - NSBHCHARTREVIEWVS_PSY_A_CORE FT
Vital Signs Last 24 Hrs  T(C): 36.4 (09-23-23 @ 08:50), Max: 36.4 (09-23-23 @ 08:50)  T(F): 97.5 (09-23-23 @ 08:50), Max: 97.5 (09-23-23 @ 08:50)  HR: --  BP: --  BP(mean): --  RR: --  SpO2: --    Orthostatic VS  09-23-23 @ 08:50  Lying BP: --/-- HR: --  Sitting BP: 143/76 HR: 94  Standing BP: --/-- HR: --  Site: --  Mode: --  Orthostatic VS  09-22-23 @ 08:11  Lying BP: --/-- HR: --  Sitting BP: 143/79 HR: 73  Standing BP: --/-- HR: --  Site: --  Mode: --

## 2023-09-24 PROCEDURE — 99231 SBSQ HOSP IP/OBS SF/LOW 25: CPT

## 2023-09-24 NOTE — BH INPATIENT PSYCHIATRY PROGRESS NOTE - NSBHASSESSSUMMFT_PSY_ALL_CORE
58F,  PPH of schizophrenia, not in current treatment, previously inpatient [place] resident in 2004, denies prior suicide attempt, denies substance use history, denies violence/legal issues, no known PMH, brought in by EMS; activated by daughter for bizarre behavior and worsening psychotic symptoms    9/24 Update  Patient remains uncooperative, irritable, paranoid and agitated on examination demanding staff leave her room and "talk to Les"  With thought disorder, Grandiose and Jehovah's witness delusions, AH and hx of being unable to care for self secondary to psychosis  Refusing all meds and will pursue TOO  Administrative meeting  for TOO 9/20 and for Court Hearing 9/26    Impression  Exacerbation of schizophrenia in the context of noncompliance    PLAN  Patient requires acute inpatient care for treatment of psychosis.   Patient admitted on a 939 emergency status 9/13 and converted to 927 on 9/15 for the purpose of TOO  Administrative meeting  for TOO 9/20 and for Court Hearing 9/26   Patient does not require constant observation at this time and will follow with  15 minute checks.   Patient will be started on SGA trial with risperidone with plan for transition to Paliperidone JUAN  Treatment will include individual therapy/supportive therapy/ rehab therapy/ psychopharmacological therapy and milieu therapy

## 2023-09-24 NOTE — BH INPATIENT PSYCHIATRY PROGRESS NOTE - NSBHCHARTREVIEWVS_PSY_A_CORE FT
Vital Signs Last 24 Hrs  T(C): 36.2 (09-24-23 @ 08:40), Max: 36.2 (09-24-23 @ 08:40)  T(F): 97.2 (09-24-23 @ 08:40), Max: 97.2 (09-24-23 @ 08:40)  HR: 91 (09-24-23 @ 08:40) (91 - 91)  BP: 140/69 (09-24-23 @ 08:40) (140/69 - 140/69)  BP(mean): --  RR: --  SpO2: --    Orthostatic VS  09-23-23 @ 08:50  Lying BP: --/-- HR: --  Sitting BP: 143/76 HR: 94  Standing BP: --/-- HR: --  Site: --  Mode: --

## 2023-09-24 NOTE — BH INPATIENT PSYCHIATRY PROGRESS NOTE - MSE UNSTRUCTURED FT
On exam today the patient remains with no clinical change   She is irritable and uncooperative with questions and screaming at writer  Speech is loud, rapid and pressured.  Thought process: with disorder of thought process.    Thought content: with paranoid and Baptism delusional beliefs.   Affect: labile from flat to angry  Patient refuses to answer questions about Suicide, homicide, perceptual disturbances, Mood, or Memory  Patient is Alert and oriented   Insight and judgment are impaired. Impulse control is tenuous at this time

## 2023-09-24 NOTE — BH INPATIENT PSYCHIATRY PROGRESS NOTE - NSBHMETABOLIC_PSY_ALL_CORE_FT
BMI:   HbA1c:   Glucose:   BP: 140/69 (09-24-23 @ 08:40) (140/69 - 140/69)  Lipid Panel: Date/Time: 09-14-23 @ 09:30  Cholesterol, Serum: 229  Direct LDL: --  HDL Cholesterol, Serum: 62  Total Cholesterol/HDL Ration Measurement: --  Triglycerides, Serum: 96

## 2023-09-25 PROCEDURE — 99231 SBSQ HOSP IP/OBS SF/LOW 25: CPT

## 2023-09-25 NOTE — BH INPATIENT PSYCHIATRY PROGRESS NOTE - NSBHCHARTREVIEWVS_PSY_A_CORE FT
Vital Signs Last 24 Hrs  T(C): 36.9 (09-25-23 @ 06:34), Max: 36.9 (09-25-23 @ 05:13)  T(F): 98.4 (09-25-23 @ 06:34), Max: 98.4 (09-25-23 @ 05:13)  HR: --  BP: --  BP(mean): --  RR: 16 (09-25-23 @ 05:13) (16 - 16)  SpO2: 100% (09-25-23 @ 05:13) (100% - 100%)    Orthostatic VS  09-25-23 @ 06:34  Lying BP: --/-- HR: --  Sitting BP: 136/70 HR: 94  Standing BP: 133/66 HR: 90  Site: --  Mode: --  Orthostatic VS  09-25-23 @ 05:13  Lying BP: --/-- HR: --  Sitting BP: 136/70 HR: 94  Standing BP: 133/66 HR: 90  Site: --  Mode: --

## 2023-09-25 NOTE — BH INPATIENT PSYCHIATRY PROGRESS NOTE - NSBHLEGALSTATUSDATE_PSY_ALL_CORE
15-Sep-2023

## 2023-09-25 NOTE — BH INPATIENT PSYCHIATRY PROGRESS NOTE - NSBHFUPINTERVALHXFT_PSY_A_CORE
Patient is seen and evaluated for follow up of psychosis.  Chart is reviewed and case discussed with treatment team.  Pt continues to refuse medications.  Pt is irritable on exam    Patient screaming at team again today on approach  She has continued to refuse all medication  She is irritable and angry on approach and refuses to participate in any interview or examination  Court for TOO scheduled for 9/26   Patient is seen and evaluated for follow up of psychosis.  Chart is reviewed and case discussed with treatment team.  Pt continues to refuse medications.  Pt is irritable on exam, guarded and refuses to participate in any interview or examination.  Pt remains acutely psychotic and is scheduled tomorrow for court for TOO on 9/26.

## 2023-09-25 NOTE — BH INPATIENT PSYCHIATRY PROGRESS NOTE - MSE UNSTRUCTURED FT
On exam today the patient remains with no clinical change   She is irritable and uncooperative with questions and screaming at writer  Speech is loud, rapid and pressured.  Thought process: with disorder of thought process.    Thought content: with paranoid and Shinto delusional beliefs.   Affect: labile from flat to angry  Patient refuses to answer questions about Suicide, homicide, perceptual disturbances, Mood, or Memory  Patient is Alert and oriented   Insight and judgment are impaired. Impulse control is tenuous at this time   On exam today the patient remains with no clinical change   She is irritable and uncooperative with questions and refuses to engage  Speech is rapid and pressured.  Thought process: with disorder of thought process.    Thought content: with paranoid and Religion delusional beliefs.   Affect: labile from flat to angry  Patient refuses to answer questions about Suicide, homicide, perceptual disturbances, Mood, or Memory  Patient is Alert and oriented   Insight and judgment are impaired. Impulse control is tenuous at this time

## 2023-09-25 NOTE — BH INPATIENT PSYCHIATRY PROGRESS NOTE - NSBHASSESSSUMMFT_PSY_ALL_CORE
58F,  PPH of schizophrenia, not in current treatment, previously inpatient [place] resident in 2004, denies prior suicide attempt, denies substance use history, denies violence/legal issues, no known PMH, brought in by EMS; activated by daughter for bizarre behavior and worsening psychotic symptoms    9/24 Update  Patient remains uncooperative, irritable, paranoid and agitated on examination demanding staff leave her room and "talk to Les"  With thought disorder, Grandiose and Sabianist delusions, AH and hx of being unable to care for self secondary to psychosis  Refusing all meds and will pursue TOO  Administrative meeting  for TOO 9/20 and for Court Hearing 9/26    Impression  Exacerbation of schizophrenia in the context of noncompliance    PLAN  Patient requires acute inpatient care for treatment of psychosis.   Patient admitted on a 939 emergency status 9/13 and converted to 927 on 9/15 for the purpose of TOO  Administrative meeting  for TOO 9/20 and for Court Hearing 9/26   Patient does not require constant observation at this time and will follow with  15 minute checks.   Patient will be started on SGA trial with risperidone with plan for transition to Paliperidone JUAN  Treatment will include individual therapy/supportive therapy/ rehab therapy/ psychopharmacological therapy and milieu therapy   58F,  PPH of schizophrenia, not in current treatment, previously inpatient [place] resident in 2004, denies prior suicide attempt, denies substance use history, denies violence/legal issues, no known PMH, brought in by EMS; activated by daughter for bizarre behavior and worsening psychotic symptoms    9/25 Update  Patient remains uncooperative, irritable, paranoid on examination.  Pt with significant sx of thought disorder, grandiose and Synagogue delusions, AH and hx of being unable to care for self secondary to psychosis.  Refusing all meds and will pursue TOO with administrative meeting  for TOO completed on 9/20 and scheduled for court hearing tomorrow, 9/26    Impression  Exacerbation of schizophrenia in the context of noncompliance    PLAN  Patient requires acute inpatient care for treatment of psychosis.   Patient admitted on a 939 emergency status 9/13 and converted to 927 on 9/15 for the purpose of TOO  Administrative meeting for TOO 9/20 and for Court Hearing tomorrow 9/26  Patient does not require constant observation at this time and will follow with 15 minute checks.  Patient will be started on SGA trial with risperidone with plan for transition to Paliperidone JUAN but currently refusing PO Risperdal.  Treatment will include individual therapy/supportive therapy/ rehab therapy/ psychopharmacological therapy and milieu therapy

## 2023-09-26 PROCEDURE — 99231 SBSQ HOSP IP/OBS SF/LOW 25: CPT

## 2023-09-26 RX ORDER — HALOPERIDOL DECANOATE 100 MG/ML
10 INJECTION INTRAMUSCULAR ONCE
Refills: 0 | Status: COMPLETED | OUTPATIENT
Start: 2023-09-26 | End: 2023-09-26

## 2023-09-26 RX ORDER — HALOPERIDOL DECANOATE 100 MG/ML
10 INJECTION INTRAMUSCULAR ONCE
Refills: 0 | Status: DISCONTINUED | OUTPATIENT
Start: 2023-09-26 | End: 2023-09-27

## 2023-09-26 RX ORDER — DIPHENHYDRAMINE HCL 50 MG
50 CAPSULE ORAL AT BEDTIME
Refills: 0 | Status: DISCONTINUED | OUTPATIENT
Start: 2023-09-26 | End: 2023-11-20

## 2023-09-26 RX ORDER — DIPHENHYDRAMINE HCL 50 MG
50 CAPSULE ORAL AT BEDTIME
Refills: 0 | Status: DISCONTINUED | OUTPATIENT
Start: 2023-09-26 | End: 2023-10-16

## 2023-09-26 RX ORDER — HALOPERIDOL DECANOATE 100 MG/ML
10 INJECTION INTRAMUSCULAR AT BEDTIME
Refills: 0 | Status: DISCONTINUED | OUTPATIENT
Start: 2023-09-27 | End: 2023-10-19

## 2023-09-26 RX ORDER — HALOPERIDOL DECANOATE 100 MG/ML
5 INJECTION INTRAMUSCULAR ONCE
Refills: 0 | Status: DISCONTINUED | OUTPATIENT
Start: 2023-09-26 | End: 2023-09-26

## 2023-09-26 RX ORDER — DIPHENHYDRAMINE HCL 50 MG
50 CAPSULE ORAL EVERY 4 HOURS
Refills: 0 | Status: DISCONTINUED | OUTPATIENT
Start: 2023-09-26 | End: 2023-11-20

## 2023-09-26 RX ORDER — DIPHENHYDRAMINE HCL 50 MG
50 CAPSULE ORAL AT BEDTIME
Refills: 0 | Status: DISCONTINUED | OUTPATIENT
Start: 2023-09-26 | End: 2023-09-26

## 2023-09-26 RX ADMIN — Medication 2 MILLIGRAM(S): at 15:56

## 2023-09-26 RX ADMIN — Medication 50 MILLIGRAM(S): at 15:56

## 2023-09-26 RX ADMIN — HALOPERIDOL DECANOATE 10 MILLIGRAM(S): 100 INJECTION INTRAMUSCULAR at 15:56

## 2023-09-26 NOTE — BH INPATIENT PSYCHIATRY PROGRESS NOTE - NSBHCHARTREVIEWVS_PSY_A_CORE FT
Vital Signs Last 24 Hrs  T(C): 36.3 (09-26-23 @ 08:13), Max: 36.3 (09-26-23 @ 08:13)  T(F): 97.3 (09-26-23 @ 08:13), Max: 97.3 (09-26-23 @ 08:13)  HR: 82 (09-26-23 @ 08:13) (82 - 82)  BP: 133/79 (09-26-23 @ 08:13) (133/79 - 133/79)  BP(mean): --  RR: --  SpO2: --    Orthostatic VS  09-25-23 @ 06:34  Lying BP: --/-- HR: --  Sitting BP: 136/70 HR: 94  Standing BP: 133/66 HR: 90  Site: --  Mode: --  Orthostatic VS  09-25-23 @ 05:13  Lying BP: --/-- HR: --  Sitting BP: 136/70 HR: 94  Standing BP: 133/66 HR: 90  Site: --  Mode: --

## 2023-09-26 NOTE — BH INPATIENT PSYCHIATRY PROGRESS NOTE - NSBHMETABOLIC_PSY_ALL_CORE_FT
BMI:   HbA1c:   Glucose:   BP: 133/79 (09-26-23 @ 08:13) (133/79 - 140/69)  Lipid Panel: Date/Time: 09-14-23 @ 09:30  Cholesterol, Serum: 229  Direct LDL: --  HDL Cholesterol, Serum: 62  Total Cholesterol/HDL Ration Measurement: --  Triglycerides, Serum: 96

## 2023-09-26 NOTE — BH INPATIENT PSYCHIATRY PROGRESS NOTE - NSBHFUPINTERVALHXFT_PSY_A_CORE
yes Patient is seen and evaluated for follow up of psychosis.    Chart reviewed and case discussed with treatment team.  Patient continues to refuse to talk or to engage with any member of the treatment team    Patient continues to refuse medications  and is refusing to attend court today for her TOO hearing

## 2023-09-26 NOTE — BH INPATIENT PSYCHIATRY PROGRESS NOTE - MSE UNSTRUCTURED FT
On exam today the patient remains with no clinical change   She is irritable and uncooperative with questions and refuses to engage any of the staff   Speech is loud, rapid and pressured when she is approached.  Thought process: with disorder of thought process.    Thought content: with paranoid and Taoist delusional beliefs yelling at staff to talk with Les only about her story.   Affect: labile from flat to angry  Patient refuses to answer questions about Suicide, homicide, perceptual disturbances, Mood, or Memory  Patient is Alert and oriented   Insight and judgment are impaired. Impulse control is tenuous at this time

## 2023-09-26 NOTE — BH INPATIENT PSYCHIATRY PROGRESS NOTE - NSTXPSYCHOGOALOTHER_PSY_ALL_CORE
Patient will be less psychotic and stable for discharge with a CGI Improvement score of 2 

## 2023-09-26 NOTE — BH TREATMENT PLAN - NSTXPSYCHOGOALOTHER_PSY_ALL_CORE
Patient will be less psychotic and stable for discharge with a CGI Improvement score of 2 

## 2023-09-27 PROCEDURE — 99231 SBSQ HOSP IP/OBS SF/LOW 25: CPT

## 2023-09-27 RX ORDER — HALOPERIDOL DECANOATE 100 MG/ML
10 INJECTION INTRAMUSCULAR AT BEDTIME
Refills: 0 | Status: DISCONTINUED | OUTPATIENT
Start: 2023-09-27 | End: 2023-10-19

## 2023-09-27 RX ORDER — DIPHENHYDRAMINE HCL 50 MG
50 CAPSULE ORAL AT BEDTIME
Refills: 0 | Status: DISCONTINUED | OUTPATIENT
Start: 2023-09-27 | End: 2023-10-16

## 2023-09-27 RX ADMIN — Medication 2 MILLIGRAM(S): at 21:18

## 2023-09-27 RX ADMIN — HALOPERIDOL DECANOATE 10 MILLIGRAM(S): 100 INJECTION INTRAMUSCULAR at 21:18

## 2023-09-27 RX ADMIN — Medication 50 MILLIGRAM(S): at 21:19

## 2023-09-27 NOTE — BH INPATIENT PSYCHIATRY PROGRESS NOTE - NSBHASSESSSUMMFT_PSY_ALL_CORE
58F,  PPH of schizophrenia, not in current treatment, previously inpatient [place] resident in 2004, denies prior suicide attempt, denies substance use history, denies violence/legal issues, no known PMH, brought in by EMS; activated by daughter for bizarre behavior and worsening psychotic symptoms    9/27 Update  Patient remains uncooperative, irritable, paranoid on examination.    Pt with significant thought disorder, grandiose and Moravian delusions  TOO Granted 9/26 and started meds yesterday as per court order    Impression  Exacerbation of schizophrenia in the context of noncompliance    PLAN  Patient requires acute inpatient care for treatment of psychosis.   Patient admitted on a 939 emergency status 9/13 and converted to 927 on 9/15 for the purpose of TOO  Administrative meeting for TOO 9/20 and for Court Hearing 9/26 which TOO was granted and medication started  Haloperidol 10 mg /diphenhydramine 50 mg /Lorazepam 2 mg as per court order  Patient does not require constant observation at this time and will follow with 15 minute checks.  Treatment will include individual therapy/supportive therapy/ rehab therapy/ psychopharmacological therapy and milieu therapy

## 2023-09-27 NOTE — BH INPATIENT PSYCHIATRY PROGRESS NOTE - CURRENT MEDICATION
MEDICATIONS  (STANDING):  diphenhydrAMINE 50 milliGRAM(s) Oral at bedtime  haloperidol     Tablet 10 milliGRAM(s) Oral at bedtime    MEDICATIONS  (PRN):  diphenhydrAMINE 50 milliGRAM(s) Oral every 4 hours PRN agitation/EPS  diphenhydrAMINE Injectable 50 milliGRAM(s) IntraMuscular at bedtime PRN EPS Prohylaxis  diphenhydrAMINE Injectable 50 milliGRAM(s) IntraMuscular at bedtime PRN EPS Prohylaxis  diphenhydrAMINE Injectable 50 milliGRAM(s) IntraMuscular at bedtime PRN MOO for psychosis if pt declines Benadryl 50 mg PO  haloperidol     Tablet 5 milliGRAM(s) Oral every 4 hours PRN agitation  haloperidol    Injectable 10 milliGRAM(s) IntraMuscular at bedtime PRN psychosis MOO if patient declines Haldol 10 mg PO  haloperidol    Injectable 5 milliGRAM(s) IntraMuscular Once PRN agitation  LORazepam     Tablet 2 milliGRAM(s) Oral every 4 hours PRN agitation  LORazepam   Injectable 2 milliGRAM(s) IntraMuscular at bedtime PRN MOO for psychosis if pt declines Ativan 2 mg PO  LORazepam   Injectable 2 milliGRAM(s) IntraMuscular once PRN severe agitation

## 2023-09-27 NOTE — BH INPATIENT PSYCHIATRY PROGRESS NOTE - MSE UNSTRUCTURED FT
On exam today the patient remains with no clinical change   She is irritable and uncooperative with questions and refuses to engage any of the staff   Speech is loud, rapid and pressured when she is approached.  Thought process: with disorder of thought process.    Thought content: with paranoid and Mormon delusional beliefs yelling at staff to talk with Les only about her story.   Affect: labile from flat to angry  Patient refuses to answer questions about Suicide, homicide, perceptual disturbances, Mood, or Memory  Patient is Alert and oriented   Insight and judgment are impaired. Impulse control is tenuous at this time

## 2023-09-27 NOTE — BH INPATIENT PSYCHIATRY PROGRESS NOTE - NSBHCHARTREVIEWVS_PSY_A_CORE FT
Vital Signs Last 24 Hrs  T(C): --  T(F): --  HR: 85 (09-27-23 @ 06:43) (85 - 85)  BP: 131/81 (09-27-23 @ 06:43) (131/81 - 131/81)  BP(mean): --  RR: --  SpO2: --

## 2023-09-27 NOTE — BH INPATIENT PSYCHIATRY PROGRESS NOTE - NSBHMETABOLIC_PSY_ALL_CORE_FT
BMI:   HbA1c:   Glucose:   BP: 131/81 (09-27-23 @ 06:43) (131/81 - 133/79)  Lipid Panel: Date/Time: 09-14-23 @ 09:30  Cholesterol, Serum: 229  Direct LDL: --  HDL Cholesterol, Serum: 62  Total Cholesterol/HDL Ration Measurement: --  Triglycerides, Serum: 96

## 2023-09-27 NOTE — BH INPATIENT PSYCHIATRY PROGRESS NOTE - PRN MEDS
MEDICATIONS  (PRN):  diphenhydrAMINE 50 milliGRAM(s) Oral every 4 hours PRN agitation/EPS  diphenhydrAMINE Injectable 50 milliGRAM(s) IntraMuscular at bedtime PRN EPS Prohylaxis  diphenhydrAMINE Injectable 50 milliGRAM(s) IntraMuscular at bedtime PRN EPS Prohylaxis  diphenhydrAMINE Injectable 50 milliGRAM(s) IntraMuscular at bedtime PRN MOO for psychosis if pt declines Benadryl 50 mg PO  haloperidol     Tablet 5 milliGRAM(s) Oral every 4 hours PRN agitation  haloperidol    Injectable 10 milliGRAM(s) IntraMuscular at bedtime PRN psychosis MOO if patient declines Haldol 10 mg PO  haloperidol    Injectable 5 milliGRAM(s) IntraMuscular Once PRN agitation  LORazepam     Tablet 2 milliGRAM(s) Oral every 4 hours PRN agitation  LORazepam   Injectable 2 milliGRAM(s) IntraMuscular at bedtime PRN MOO for psychosis if pt declines Ativan 2 mg PO  LORazepam   Injectable 2 milliGRAM(s) IntraMuscular once PRN severe agitation

## 2023-09-27 NOTE — BH INPATIENT PSYCHIATRY PROGRESS NOTE - NSBHFUPINTERVALHXFT_PSY_A_CORE
Patient is seen and evaluated for follow up of psychosis.    Chart reviewed and case discussed with treatment team.  Patient did not attend court hearing yesterday for TOO when court granted the order  Patient refused PO meds yesterday but as per court order received IM injections of haloperidol 10 mg/diphenhydramine 50 mg /Lorazepam 2 mg  Tolerated them well b  Today she continues to refuse to talk or to engage with any member of the treatment team

## 2023-09-28 PROCEDURE — 99231 SBSQ HOSP IP/OBS SF/LOW 25: CPT

## 2023-09-28 RX ADMIN — Medication 2 MILLIGRAM(S): at 21:40

## 2023-09-28 RX ADMIN — HALOPERIDOL DECANOATE 10 MILLIGRAM(S): 100 INJECTION INTRAMUSCULAR at 21:41

## 2023-09-28 RX ADMIN — Medication 50 MILLIGRAM(S): at 21:41

## 2023-09-28 NOTE — BH INPATIENT PSYCHIATRY PROGRESS NOTE - PRN MEDS
MEDICATIONS  (PRN):  diphenhydrAMINE 50 milliGRAM(s) Oral every 4 hours PRN agitation/EPS  diphenhydrAMINE Injectable 50 milliGRAM(s) IntraMuscular at bedtime PRN EPS Prohylaxis  diphenhydrAMINE Injectable 50 milliGRAM(s) IntraMuscular at bedtime PRN EPS Prohylaxis  diphenhydrAMINE Injectable 50 milliGRAM(s) IntraMuscular at bedtime PRN MOO for psychosis if pt declines Benadryl 50 mg PO  haloperidol     Tablet 5 milliGRAM(s) Oral every 4 hours PRN agitation  haloperidol    Injectable 5 milliGRAM(s) IntraMuscular Once PRN agitation  haloperidol    Injectable 10 milliGRAM(s) IntraMuscular at bedtime PRN psychosis MOO if patient declines Haldol 10 mg PO  LORazepam     Tablet 2 milliGRAM(s) Oral every 4 hours PRN agitation  LORazepam   Injectable 2 milliGRAM(s) IntraMuscular at bedtime PRN MOO for psychosis if pt declines Ativan 2 mg PO  LORazepam   Injectable 2 milliGRAM(s) IntraMuscular once PRN severe agitation

## 2023-09-28 NOTE — BH INPATIENT PSYCHIATRY PROGRESS NOTE - NSBHFUPINTERVALHXFT_PSY_A_CORE
Patient is seen and evaluated for follow up of psychosis.    Chart reviewed and case discussed with treatment team.  Patient irritable, angry, paranoid and hostile towards MD  Patient refused PO meds again last night and again was combative when receiving them IM as per court order  Received IM injections of haloperidol 10 mg/diphenhydramine 50 mg /Lorazepam 2 mg    Today she continues to refuse to talk or to engage with any member of the treatment team

## 2023-09-28 NOTE — BH INPATIENT PSYCHIATRY PROGRESS NOTE - NSBHCHARTREVIEWVS_PSY_A_CORE FT
Vital Signs Last 24 Hrs  T(C): 36.3 (09-28-23 @ 06:39), Max: 36.3 (09-28-23 @ 06:39)  T(F): 97.4 (09-28-23 @ 06:39), Max: 97.4 (09-28-23 @ 06:39)  HR: --  BP: --  BP(mean): --  RR: --  SpO2: --    Orthostatic VS  09-28-23 @ 06:39  Lying BP: --/-- HR: --  Sitting BP: 145/85 HR: 90  Standing BP: --/-- HR: --  Site: --  Mode: --

## 2023-09-28 NOTE — BH INPATIENT PSYCHIATRY PROGRESS NOTE - NSBHASSESSSUMMFT_PSY_ALL_CORE
58F,  PPH of schizophrenia, not in current treatment, previously inpatient [place] resident in 2004, denies prior suicide attempt, denies substance use history, denies violence/legal issues, no known PMH, brought in by EMS; activated by daughter for bizarre behavior and worsening psychotic symptoms    9/28 Update  Patient remains uncooperative, irritable, paranoid on examination.    Pt with significant thought disorder, grandiose and Judaism delusions  TOO Granted 9/26 and will continue with haloperidol trial as per court order    Impression  Exacerbation of schizophrenia in the context of noncompliance    PLAN  Patient requires acute inpatient care for treatment of psychosis.   Patient admitted on a 939 emergency status 9/13 and converted to 927 on 9/15 for the purpose of TOO  Administrative meeting for TOO 9/20 and for Court Hearing 9/26 which TOO was granted and medication started  Haloperidol 10 mg /diphenhydramine 50 mg /Lorazepam 2 mg as per court order  Patient does not require constant observation at this time and will follow with 15 minute checks.  Treatment will include individual therapy/supportive therapy/ rehab therapy/ psychopharmacological therapy and milieu therapy

## 2023-09-28 NOTE — BH INPATIENT PSYCHIATRY PROGRESS NOTE - MSE UNSTRUCTURED FT
On exam today the patient remains with no clinical change   She is irritable and uncooperative with questions and refuses to engage any of the staff   Speech is loud, rapid and pressured and screams when she is approached.  Thought process: with disorder of thought process.    Thought content: with paranoid and Yazdanism delusional beliefs yelling at staff to talk with Les only about her story.   Affect: labile from flat to angry  Patient refuses to answer questions about Suicide, homicide, perceptual disturbances, Mood, or Memory  Patient is Alert and oriented   Insight and judgment are impaired. Impulse control is tenuous at this time

## 2023-09-28 NOTE — DIETITIAN INITIAL EVALUATION ADULT - PERTINENT MEDS FT
MEDICATIONS  (STANDING):  diphenhydrAMINE 50 milliGRAM(s) Oral at bedtime  haloperidol     Tablet 10 milliGRAM(s) Oral at bedtime  LORazepam     Tablet 2 milliGRAM(s) Oral at bedtime    MEDICATIONS  (PRN):  diphenhydrAMINE 50 milliGRAM(s) Oral every 4 hours PRN agitation/EPS  diphenhydrAMINE Injectable 50 milliGRAM(s) IntraMuscular at bedtime PRN MOO for psychosis if pt declines Benadryl 50 mg PO  diphenhydrAMINE Injectable 50 milliGRAM(s) IntraMuscular at bedtime PRN EPS Prohylaxis  diphenhydrAMINE Injectable 50 milliGRAM(s) IntraMuscular at bedtime PRN EPS Prohylaxis  haloperidol     Tablet 5 milliGRAM(s) Oral every 4 hours PRN agitation  haloperidol    Injectable 5 milliGRAM(s) IntraMuscular Once PRN agitation  haloperidol    Injectable 10 milliGRAM(s) IntraMuscular at bedtime PRN psychosis MOO if patient declines Haldol 10 mg PO  LORazepam     Tablet 2 milliGRAM(s) Oral every 4 hours PRN agitation  LORazepam   Injectable 2 milliGRAM(s) IntraMuscular at bedtime PRN MOO for psychosis if pt declines Ativan 2 mg PO  LORazepam   Injectable 2 milliGRAM(s) IntraMuscular once PRN severe agitation

## 2023-09-28 NOTE — BH INPATIENT PSYCHIATRY PROGRESS NOTE - CURRENT MEDICATION
MEDICATIONS  (STANDING):  diphenhydrAMINE 50 milliGRAM(s) Oral at bedtime  haloperidol     Tablet 10 milliGRAM(s) Oral at bedtime  LORazepam     Tablet 2 milliGRAM(s) Oral at bedtime    MEDICATIONS  (PRN):  diphenhydrAMINE 50 milliGRAM(s) Oral every 4 hours PRN agitation/EPS  diphenhydrAMINE Injectable 50 milliGRAM(s) IntraMuscular at bedtime PRN EPS Prohylaxis  diphenhydrAMINE Injectable 50 milliGRAM(s) IntraMuscular at bedtime PRN EPS Prohylaxis  diphenhydrAMINE Injectable 50 milliGRAM(s) IntraMuscular at bedtime PRN MOO for psychosis if pt declines Benadryl 50 mg PO  haloperidol     Tablet 5 milliGRAM(s) Oral every 4 hours PRN agitation  haloperidol    Injectable 5 milliGRAM(s) IntraMuscular Once PRN agitation  haloperidol    Injectable 10 milliGRAM(s) IntraMuscular at bedtime PRN psychosis MOO if patient declines Haldol 10 mg PO  LORazepam     Tablet 2 milliGRAM(s) Oral every 4 hours PRN agitation  LORazepam   Injectable 2 milliGRAM(s) IntraMuscular at bedtime PRN MOO for psychosis if pt declines Ativan 2 mg PO  LORazepam   Injectable 2 milliGRAM(s) IntraMuscular once PRN severe agitation

## 2023-09-28 NOTE — DIETITIAN INITIAL EVALUATION ADULT - OTHER INFO
58F,  PPH of schizophrenia, not in current treatment, previously inpatient [place] resident in 2004, denies prior suicide attempt, denies substance use history, denies violence/legal issues, no known PMH, brought in by EMS; activated by daughter for bizarre behavior and worsening psychotic symptoms.  Encountered patient in her room who was lying in her bed. Patient endorses good appetite. States she weighs 130# (current weight: 206#). RD tried to give diet education on healthy diet (Chol and LDL elevated) when patient became angry and told RD to leave the room. No reported GI distress.

## 2023-09-29 PROCEDURE — 99231 SBSQ HOSP IP/OBS SF/LOW 25: CPT

## 2023-09-29 RX ADMIN — Medication 50 MILLIGRAM(S): at 21:11

## 2023-09-29 RX ADMIN — Medication 2 MILLIGRAM(S): at 21:11

## 2023-09-29 RX ADMIN — HALOPERIDOL DECANOATE 10 MILLIGRAM(S): 100 INJECTION INTRAMUSCULAR at 21:12

## 2023-09-29 NOTE — BH INPATIENT PSYCHIATRY PROGRESS NOTE - CURRENT MEDICATION
MEDICATIONS  (STANDING):  diphenhydrAMINE 50 milliGRAM(s) Oral at bedtime  haloperidol     Tablet 10 milliGRAM(s) Oral at bedtime  LORazepam     Tablet 2 milliGRAM(s) Oral at bedtime    MEDICATIONS  (PRN):  diphenhydrAMINE 50 milliGRAM(s) Oral every 4 hours PRN agitation/EPS  diphenhydrAMINE Injectable 50 milliGRAM(s) IntraMuscular at bedtime PRN MOO for psychosis if pt declines Benadryl 50 mg PO  diphenhydrAMINE Injectable 50 milliGRAM(s) IntraMuscular at bedtime PRN EPS Prohylaxis  diphenhydrAMINE Injectable 50 milliGRAM(s) IntraMuscular at bedtime PRN EPS Prohylaxis  haloperidol     Tablet 5 milliGRAM(s) Oral every 4 hours PRN agitation  haloperidol    Injectable 5 milliGRAM(s) IntraMuscular Once PRN agitation  haloperidol    Injectable 10 milliGRAM(s) IntraMuscular at bedtime PRN psychosis MOO if patient declines Haldol 10 mg PO  LORazepam     Tablet 2 milliGRAM(s) Oral every 4 hours PRN agitation  LORazepam   Injectable 2 milliGRAM(s) IntraMuscular once PRN severe agitation  LORazepam   Injectable 2 milliGRAM(s) IntraMuscular at bedtime PRN MOO for psychosis if pt declines Ativan 2 mg PO   MEDICATIONS  (STANDING):  diphenhydrAMINE 50 milliGRAM(s) Oral at bedtime  haloperidol     Tablet 10 milliGRAM(s) Oral at bedtime  LORazepam     Tablet 2 milliGRAM(s) Oral at bedtime    MEDICATIONS  (PRN):  diphenhydrAMINE 50 milliGRAM(s) Oral every 4 hours PRN agitation/EPS  diphenhydrAMINE Injectable 50 milliGRAM(s) IntraMuscular at bedtime PRN MOO for psychosis if pt declines Benadryl 50 mg PO  diphenhydrAMINE Injectable 50 milliGRAM(s) IntraMuscular at bedtime PRN EPS Prohylaxis  diphenhydrAMINE Injectable 50 milliGRAM(s) IntraMuscular at bedtime PRN EPS Prohylaxis  haloperidol     Tablet 5 milliGRAM(s) Oral every 4 hours PRN agitation  haloperidol    Injectable 10 milliGRAM(s) IntraMuscular at bedtime PRN psychosis MOO if patient declines Haldol 10 mg PO  haloperidol    Injectable 5 milliGRAM(s) IntraMuscular Once PRN agitation  LORazepam     Tablet 2 milliGRAM(s) Oral every 4 hours PRN agitation  LORazepam   Injectable 2 milliGRAM(s) IntraMuscular once PRN severe agitation  LORazepam   Injectable 2 milliGRAM(s) IntraMuscular at bedtime PRN MOO for psychosis if pt declines Ativan 2 mg PO

## 2023-09-29 NOTE — BH INPATIENT PSYCHIATRY PROGRESS NOTE - NSBHCHARTREVIEWVS_PSY_A_CORE FT
Vital Signs Last 24 Hrs  T(C): 36.2 (09-29-23 @ 06:23), Max: 36.2 (09-29-23 @ 06:23)  T(F): 97.1 (09-29-23 @ 06:23), Max: 97.1 (09-29-23 @ 06:23)  HR: --  BP: --  BP(mean): --  RR: --  SpO2: --    Orthostatic VS  09-29-23 @ 06:23  Lying BP: --/-- HR: --  Sitting BP: 150/86 HR: 100  Standing BP: --/-- HR: --  Site: --  Mode: --  Orthostatic VS  09-28-23 @ 06:39  Lying BP: --/-- HR: --  Sitting BP: 145/85 HR: 90  Standing BP: --/-- HR: --  Site: --  Mode: --

## 2023-09-29 NOTE — BH INPATIENT PSYCHIATRY PROGRESS NOTE - MSE UNSTRUCTURED FT
On exam today the patient remains with minimal clinical change   She is irritable and uncooperative with questions and refuses to engage any of the staff   Speech is loud, rapid and pressured and screams when she is approached.  Thought process: with disorder of thought process.    Thought content: with paranoid and Taoist delusional beliefs yelling at staff to talk with Les only about her story.   Affect: labile from flat to angry  Patient refuses to answer questions about Suicide, homicide, perceptual disturbances, Mood, or Memory  Patient is Alert and oriented   Insight and judgment are impaired. Impulse control is tenuous at this time

## 2023-09-29 NOTE — BH INPATIENT PSYCHIATRY PROGRESS NOTE - PRN MEDS
MEDICATIONS  (PRN):  diphenhydrAMINE 50 milliGRAM(s) Oral every 4 hours PRN agitation/EPS  diphenhydrAMINE Injectable 50 milliGRAM(s) IntraMuscular at bedtime PRN MOO for psychosis if pt declines Benadryl 50 mg PO  diphenhydrAMINE Injectable 50 milliGRAM(s) IntraMuscular at bedtime PRN EPS Prohylaxis  diphenhydrAMINE Injectable 50 milliGRAM(s) IntraMuscular at bedtime PRN EPS Prohylaxis  haloperidol     Tablet 5 milliGRAM(s) Oral every 4 hours PRN agitation  haloperidol    Injectable 5 milliGRAM(s) IntraMuscular Once PRN agitation  haloperidol    Injectable 10 milliGRAM(s) IntraMuscular at bedtime PRN psychosis MOO if patient declines Haldol 10 mg PO  LORazepam     Tablet 2 milliGRAM(s) Oral every 4 hours PRN agitation  LORazepam   Injectable 2 milliGRAM(s) IntraMuscular once PRN severe agitation  LORazepam   Injectable 2 milliGRAM(s) IntraMuscular at bedtime PRN MOO for psychosis if pt declines Ativan 2 mg PO   MEDICATIONS  (PRN):  diphenhydrAMINE 50 milliGRAM(s) Oral every 4 hours PRN agitation/EPS  diphenhydrAMINE Injectable 50 milliGRAM(s) IntraMuscular at bedtime PRN MOO for psychosis if pt declines Benadryl 50 mg PO  diphenhydrAMINE Injectable 50 milliGRAM(s) IntraMuscular at bedtime PRN EPS Prohylaxis  diphenhydrAMINE Injectable 50 milliGRAM(s) IntraMuscular at bedtime PRN EPS Prohylaxis  haloperidol     Tablet 5 milliGRAM(s) Oral every 4 hours PRN agitation  haloperidol    Injectable 10 milliGRAM(s) IntraMuscular at bedtime PRN psychosis MOO if patient declines Haldol 10 mg PO  haloperidol    Injectable 5 milliGRAM(s) IntraMuscular Once PRN agitation  LORazepam     Tablet 2 milliGRAM(s) Oral every 4 hours PRN agitation  LORazepam   Injectable 2 milliGRAM(s) IntraMuscular once PRN severe agitation  LORazepam   Injectable 2 milliGRAM(s) IntraMuscular at bedtime PRN MOO for psychosis if pt declines Ativan 2 mg PO

## 2023-09-29 NOTE — BH INPATIENT PSYCHIATRY PROGRESS NOTE - NSBHFUPINTERVALHXFT_PSY_A_CORE
Patient is seen and evaluated for follow up of psychosis.    Chart reviewed and case discussed with treatment team.  Patient irritable, angry, paranoid and hostile towards MD  Patient agreed with the help the support team agreed to PO meds last night   Received PO haloperidol 10 mg/diphenhydramine 50 mg /Lorazepam 2 mg  Still continues to refuse to talk or to engage with any member of the treatment team       Patient is seen and evaluated for follow up of psychosis.    Chart reviewed and case discussed with treatment team.  Patient irritable, angry, paranoid and hostile towards MD  Patient refused PO meds but agreed with the help the support team agreed to IM meds last night   Received IM haloperidol 10 mg/diphenhydramine 50 mg /Lorazepam 2 mg  Still continues to refuse to talk or to engage with any member of the treatment team

## 2023-09-30 RX ADMIN — Medication 2 MILLIGRAM(S): at 20:32

## 2023-09-30 RX ADMIN — HALOPERIDOL DECANOATE 10 MILLIGRAM(S): 100 INJECTION INTRAMUSCULAR at 20:32

## 2023-09-30 RX ADMIN — Medication 50 MILLIGRAM(S): at 20:32

## 2023-10-01 RX ADMIN — Medication 50 MILLIGRAM(S): at 20:38

## 2023-10-01 RX ADMIN — Medication 2 MILLIGRAM(S): at 20:37

## 2023-10-01 RX ADMIN — HALOPERIDOL DECANOATE 10 MILLIGRAM(S): 100 INJECTION INTRAMUSCULAR at 20:37

## 2023-10-02 PROCEDURE — 99231 SBSQ HOSP IP/OBS SF/LOW 25: CPT

## 2023-10-02 RX ADMIN — Medication 2 MILLIGRAM(S): at 20:01

## 2023-10-02 RX ADMIN — HALOPERIDOL DECANOATE 10 MILLIGRAM(S): 100 INJECTION INTRAMUSCULAR at 20:01

## 2023-10-02 RX ADMIN — Medication 50 MILLIGRAM(S): at 20:02

## 2023-10-02 NOTE — BH INPATIENT PSYCHIATRY PROGRESS NOTE - CURRENT MEDICATION
MEDICATIONS  (STANDING):  diphenhydrAMINE 50 milliGRAM(s) Oral at bedtime  haloperidol     Tablet 10 milliGRAM(s) Oral at bedtime  LORazepam     Tablet 2 milliGRAM(s) Oral at bedtime    MEDICATIONS  (PRN):  diphenhydrAMINE 50 milliGRAM(s) Oral every 4 hours PRN agitation/EPS  diphenhydrAMINE Injectable 50 milliGRAM(s) IntraMuscular at bedtime PRN EPS Prohylaxis  diphenhydrAMINE Injectable 50 milliGRAM(s) IntraMuscular at bedtime PRN MOO for psychosis if pt declines Benadryl 50 mg PO  diphenhydrAMINE Injectable 50 milliGRAM(s) IntraMuscular at bedtime PRN EPS Prohylaxis  haloperidol     Tablet 5 milliGRAM(s) Oral every 4 hours PRN agitation  haloperidol    Injectable 10 milliGRAM(s) IntraMuscular at bedtime PRN psychosis MOO if patient declines Haldol 10 mg PO  haloperidol    Injectable 5 milliGRAM(s) IntraMuscular Once PRN agitation  LORazepam     Tablet 2 milliGRAM(s) Oral every 4 hours PRN agitation  LORazepam   Injectable 2 milliGRAM(s) IntraMuscular at bedtime PRN MOO for psychosis if pt declines Ativan 2 mg PO  LORazepam   Injectable 2 milliGRAM(s) IntraMuscular once PRN severe agitation

## 2023-10-02 NOTE — BH INPATIENT PSYCHIATRY PROGRESS NOTE - MSE UNSTRUCTURED FT
On exam today the patient remains with minimal clinical change   She is irritable and uncooperative with questions and refuses to engage any of the staff   Speech is loud, rapid and pressured and screams when she is approached.  Thought process: with disorder of thought process.    Thought content: with paranoid and Latter day delusional beliefs yelling at staff to talk with Les only about her story.   Affect: labile from flat to angry  Patient refuses to answer questions about Suicide, homicide, perceptual disturbances, Mood, or Memory  Patient is Alert and oriented   Insight and judgment are impaired. Impulse control is tenuous at this time

## 2023-10-02 NOTE — BH INPATIENT PSYCHIATRY PROGRESS NOTE - NSBHMETABOLIC_PSY_ALL_CORE_FT
BMI:   HbA1c:   Glucose:   BP: 140/98 (10-02-23 @ 06:23) (140/98 - 143/94)  Lipid Panel: Date/Time: 09-14-23 @ 09:30  Cholesterol, Serum: 229  Direct LDL: --  HDL Cholesterol, Serum: 62  Total Cholesterol/HDL Ration Measurement: --  Triglycerides, Serum: 96

## 2023-10-02 NOTE — BH INPATIENT PSYCHIATRY PROGRESS NOTE - NSBHASSESSSUMMFT_PSY_ALL_CORE
58F,  PPH of schizophrenia, not in current treatment, previously inpatient [place] resident in 2004, denies prior suicide attempt, denies substance use history, denies violence/legal issues, no known PMH, brought in by EMS; activated by daughter for bizarre behavior and worsening psychotic symptoms    10/1 Update  Patient remains uncooperative, irritable, paranoid on examination   Refused PO meds and again received IM meds as per court order  Patient with significant thought disorder, grandiose and Catholic delusions  TOO Granted 9/26 and will continue with haloperidol trial as per court order    Impression  Exacerbation of schizophrenia in the context of noncompliance    PLAN  Patient requires acute inpatient care for treatment of psychosis.   Patient admitted on a 939 emergency status 9/13 and converted to 927 on 9/15 for the purpose of TOO  Administrative meeting for TOO 9/20 and for Court Hearing 9/26 which TOO was granted and medication started  Haloperidol 10 mg /diphenhydramine 50 mg /Lorazepam 2 mg as per court order  Patient does not require constant observation at this time and will follow with 15 minute checks.  Treatment will include individual therapy/supportive therapy/ rehab therapy/ psychopharmacological therapy and milieu therapy

## 2023-10-02 NOTE — BH INPATIENT PSYCHIATRY PROGRESS NOTE - NSBHFUPINTERVALHXFT_PSY_A_CORE
Patient is seen and evaluated for follow up of psychosis.    Chart reviewed and case discussed with treatment team.  Patient has continued to refuse the PO meds offered but agreed with the help the support team agreed to IM meds every night  States to MD that she will "NEVER take meds"  Received IM haloperidol 10 mg/diphenhydramine 50 mg /Lorazepam 2 mg nightly as per court order  Still continues to refuse to talk or to engage with any member of the treatment team and is hostile

## 2023-10-02 NOTE — BH INPATIENT PSYCHIATRY PROGRESS NOTE - NSBHCHARTREVIEWVS_PSY_A_CORE FT
Vital Signs Last 24 Hrs  T(C): 36.1 (10-02-23 @ 06:23), Max: 36.1 (10-02-23 @ 06:23)  T(F): 97 (10-02-23 @ 06:23), Max: 97 (10-02-23 @ 06:23)  HR: 110 (10-02-23 @ 06:23) (110 - 110)  BP: 140/98 (10-02-23 @ 06:23) (140/98 - 140/98)  BP(mean): --  RR: --  SpO2: --    Orthostatic VS  10-01-23 @ 08:32  Lying BP: --/-- HR: --  Sitting BP: 143/81 HR: 88  Standing BP: --/-- HR: --  Site: --  Mode: --

## 2023-10-02 NOTE — BH INPATIENT PSYCHIATRY PROGRESS NOTE - NSBHFUPINTERVALCCFT_PSY_A_CORE
"Patient presents to the clinic for medicare wellness.    FOOD SECURITY SCREENING QUESTIONS:    The next two questions are to help us understand your food security.  If you are feeling you need any assistance in this area, we have resources available to support you today.    Hunger Vital Signs:  Within the past 12 months we worried whether our food would run out before we got money to buy more. Never  Within the past 12 months the food we bought just didn't last and we didn't have money to get more. Never    Advance Care Directive on file? yes  Advance Care Directive provided to patient? Declined.       Chief Complaint   Patient presents with     Medicare Visit     Wellness         Initial /80 (BP Location: Right arm, Patient Position: Sitting, Cuff Size: Adult Large)   Pulse 76   Temp 97.9  F (36.6  C) (Tympanic)   Resp 20   Ht 1.695 m (5' 6.75\")   Wt 101.6 kg (224 lb)   SpO2 97%   BMI 35.35 kg/m   Estimated body mass index is 35.35 kg/m  as calculated from the following:    Height as of this encounter: 1.695 m (5' 6.75\").    Weight as of this encounter: 101.6 kg (224 lb).  Medication Reconciliation: complete        Yuridia Rosenthal LPN    " "I have absolutely nothing to say to you!"

## 2023-10-02 NOTE — BH INPATIENT PSYCHIATRY PROGRESS NOTE - PRN MEDS
MEDICATIONS  (PRN):  diphenhydrAMINE 50 milliGRAM(s) Oral every 4 hours PRN agitation/EPS  diphenhydrAMINE Injectable 50 milliGRAM(s) IntraMuscular at bedtime PRN EPS Prohylaxis  diphenhydrAMINE Injectable 50 milliGRAM(s) IntraMuscular at bedtime PRN MOO for psychosis if pt declines Benadryl 50 mg PO  diphenhydrAMINE Injectable 50 milliGRAM(s) IntraMuscular at bedtime PRN EPS Prohylaxis  haloperidol     Tablet 5 milliGRAM(s) Oral every 4 hours PRN agitation  haloperidol    Injectable 10 milliGRAM(s) IntraMuscular at bedtime PRN psychosis MOO if patient declines Haldol 10 mg PO  haloperidol    Injectable 5 milliGRAM(s) IntraMuscular Once PRN agitation  LORazepam     Tablet 2 milliGRAM(s) Oral every 4 hours PRN agitation  LORazepam   Injectable 2 milliGRAM(s) IntraMuscular at bedtime PRN MOO for psychosis if pt declines Ativan 2 mg PO  LORazepam   Injectable 2 milliGRAM(s) IntraMuscular once PRN severe agitation

## 2023-10-03 PROCEDURE — 99232 SBSQ HOSP IP/OBS MODERATE 35: CPT

## 2023-10-03 RX ORDER — HALOPERIDOL DECANOATE 100 MG/ML
50 INJECTION INTRAMUSCULAR ONCE
Refills: 0 | Status: COMPLETED | OUTPATIENT
Start: 2023-10-03 | End: 2023-10-03

## 2023-10-03 RX ADMIN — HALOPERIDOL DECANOATE 10 MILLIGRAM(S): 100 INJECTION INTRAMUSCULAR at 20:45

## 2023-10-03 RX ADMIN — Medication 2 MILLIGRAM(S): at 20:45

## 2023-10-03 RX ADMIN — Medication 50 MILLIGRAM(S): at 20:46

## 2023-10-03 RX ADMIN — HALOPERIDOL DECANOATE 50 MILLIGRAM(S): 100 INJECTION INTRAMUSCULAR at 16:12

## 2023-10-03 NOTE — BH INPATIENT PSYCHIATRY PROGRESS NOTE - CURRENT MEDICATION
MEDICATIONS  (STANDING):  diphenhydrAMINE 50 milliGRAM(s) Oral at bedtime  haloperidol     Tablet 10 milliGRAM(s) Oral at bedtime  haloperidol decanoate Injectable, Long Acting 50 milliGRAM(s) IntraMuscular once  LORazepam     Tablet 2 milliGRAM(s) Oral at bedtime    MEDICATIONS  (PRN):  diphenhydrAMINE 50 milliGRAM(s) Oral every 4 hours PRN agitation/EPS  diphenhydrAMINE Injectable 50 milliGRAM(s) IntraMuscular at bedtime PRN EPS Prohylaxis  diphenhydrAMINE Injectable 50 milliGRAM(s) IntraMuscular at bedtime PRN EPS Prohylaxis  diphenhydrAMINE Injectable 50 milliGRAM(s) IntraMuscular at bedtime PRN MOO for psychosis if pt declines Benadryl 50 mg PO  haloperidol     Tablet 5 milliGRAM(s) Oral every 4 hours PRN agitation  haloperidol    Injectable 5 milliGRAM(s) IntraMuscular Once PRN agitation  haloperidol    Injectable 10 milliGRAM(s) IntraMuscular at bedtime PRN psychosis MOO if patient declines Haldol 10 mg PO  LORazepam     Tablet 2 milliGRAM(s) Oral every 4 hours PRN agitation  LORazepam   Injectable 2 milliGRAM(s) IntraMuscular at bedtime PRN MOO for psychosis if pt declines Ativan 2 mg PO  LORazepam   Injectable 2 milliGRAM(s) IntraMuscular once PRN severe agitation

## 2023-10-03 NOTE — BH INPATIENT PSYCHIATRY PROGRESS NOTE - NSBHFUPINTERVALHXFT_PSY_A_CORE
Patient is seen and evaluated for follow up of psychosis.    Chart reviewed and case discussed with treatment team.  Patient has continued to refuse the PO meds offered but has agreed with the help the support team agreed to IM meds every night  States to MD that she will "NEVER take meds" but tried to discuss plan to add Haloperidol Decanoate JUAN today to eventually decrease her daily IM's  Received IM haloperidol 10 mg/diphenhydramine 50 mg /Lorazepam 2 mg nightly as per court order  Still continues to refuse to talk or to engage with any member of the treatment team and is hostile

## 2023-10-03 NOTE — BH INPATIENT PSYCHIATRY PROGRESS NOTE - NSBHCHARTREVIEWVS_PSY_A_CORE FT
Vital Signs Last 24 Hrs  T(C): 36.6 (10-03-23 @ 07:58), Max: 36.6 (10-03-23 @ 07:58)  T(F): 97.9 (10-03-23 @ 07:58), Max: 97.9 (10-03-23 @ 07:58)  HR: --  BP: 130/77 (10-03-23 @ 07:58) (130/77 - 130/77)  BP(mean): 87 (10-03-23 @ 07:58) (87 - 87)  RR: --  SpO2: --

## 2023-10-03 NOTE — BH INPATIENT PSYCHIATRY PROGRESS NOTE - NSBHMETABOLIC_PSY_ALL_CORE_FT
BMI:   HbA1c:   Glucose:   BP: 130/77 (10-03-23 @ 07:58) (130/77 - 140/98)  Lipid Panel: Date/Time: 09-14-23 @ 09:30  Cholesterol, Serum: 229  Direct LDL: --  HDL Cholesterol, Serum: 62  Total Cholesterol/HDL Ration Measurement: --  Triglycerides, Serum: 96

## 2023-10-03 NOTE — BH INPATIENT PSYCHIATRY PROGRESS NOTE - NSBHASSESSSUMMFT_PSY_ALL_CORE
58F,  PPH of schizophrenia, not in current treatment, previously inpatient [place] resident in 2004, denies prior suicide attempt, denies substance use history, denies violence/legal issues, no known PMH, brought in by EMS; activated by daughter for bizarre behavior and worsening psychotic symptoms    10/3 Update  Patient remains uncooperative, irritable, paranoid on examination   Refused PO meds and again received IM meds as per court order  Patient with significant thought disorder, grandiose and Rastafari delusions  TOO Granted 9/26 and will continue with haloperidol trial as per court order    Impression  Exacerbation of schizophrenia in the context of noncompliance    PLAN  Patient requires acute inpatient care for treatment of psychosis.   Patient admitted on a 939 emergency status 9/13 and converted to 927 on 9/15 for the purpose of TOO  Administrative meeting for TOO 9/20 and for Court Hearing 9/26 which TOO was granted and medication started  Haloperidol 10 mg /diphenhydramine 50 mg /Lorazepam 2 mg as per court order PO offered, IM if refused  10/3 Haloperidol JUAN 75 mg X 1   Patient does not require constant observation at this time and will follow with 15 minute checks.  Treatment will include individual therapy/supportive therapy/ rehab therapy/ psychopharmacological therapy and milieu therapy

## 2023-10-03 NOTE — BH INPATIENT PSYCHIATRY PROGRESS NOTE - MSE UNSTRUCTURED FT
On exam today the patient remains with minimal clinical change   She is irritable and uncooperative with questions and refuses to engage any of the staff   Speech is loud, rapid and pressured and screams when she is approached.  Thought process: with disorder of thought process.    Thought content: with paranoid and Rastafari delusional beliefs yelling at staff to talk with Les only about her story.   Affect: labile from flat to angry  Patient refuses to answer questions about Suicide, homicide, perceptual disturbances, Mood, or Memory  Patient is Alert and oriented   Insight and judgment are impaired. Impulse control is tenuous at this time

## 2023-10-04 PROCEDURE — 99232 SBSQ HOSP IP/OBS MODERATE 35: CPT

## 2023-10-04 RX ADMIN — HALOPERIDOL DECANOATE 10 MILLIGRAM(S): 100 INJECTION INTRAMUSCULAR at 20:59

## 2023-10-04 RX ADMIN — Medication 2 MILLIGRAM(S): at 20:59

## 2023-10-04 RX ADMIN — Medication 50 MILLIGRAM(S): at 20:58

## 2023-10-04 NOTE — BH INPATIENT PSYCHIATRY PROGRESS NOTE - MSE UNSTRUCTURED FT
On exam today the patient remains with minimal clinical change   She is irritable and uncooperative with questions and refuses to engage any of the staff   Speech is loud, rapid and pressured and screams when she is approached.  Thought process: with disorder of thought process.    Thought content: with paranoid and Restorationism delusional beliefs yelling at staff to talk with Les only about her story.   Affect: labile from flat to angry  Patient refuses to answer questions about Suicide, homicide, perceptual disturbances, Mood, or Memory  Patient is Alert and oriented   Insight and judgment are impaired. Impulse control is tenuous at this time

## 2023-10-04 NOTE — BH INPATIENT PSYCHIATRY PROGRESS NOTE - NSBHASSESSSUMMFT_PSY_ALL_CORE
58F,  PPH of schizophrenia, not in current treatment, previously inpatient [place] resident in 2004, denies prior suicide attempt, denies substance use history, denies violence/legal issues, no known PMH, brought in by EMS; activated by daughter for bizarre behavior and worsening psychotic symptoms    10/4 Update  Patient remains uncooperative, irritable, paranoid on examination   Refused PO meds and again received IM meds as per court order  Patient with significant thought disorder, grandiose and Worship delusions  TOO Granted 9/26 and will continue with haloperidol trial as per court order  Receiving  IM haloperidol 10 mg/diphenhydramine 50 mg /Lorazepam 2 mg nightly as per court order  Received Haloperidol Decanoate 75 mg IM 10/3 as per court order    Impression  Exacerbation of schizophrenia in the context of noncompliance    PLAN  Patient requires acute inpatient care for treatment of psychosis.   Patient admitted on a 939 emergency status 9/13 and converted to 927 on 9/15 for the purpose of TOO  Administrative meeting for TOO 9/20 and for Court Hearing 9/26 which TOO was granted and medication started  Haloperidol 10 mg /diphenhydramine 50 mg /Lorazepam 2 mg as per court order PO offered, IM if refused  10/3 Haloperidol Decanoate JUAN 75 mg   Patient does not require constant observation at this time and will follow with 15 minute checks.  Treatment will include individual therapy/supportive therapy/ rehab therapy/ psychopharmacological therapy and milieu therapy

## 2023-10-04 NOTE — BH INPATIENT PSYCHIATRY PROGRESS NOTE - NSBHFUPINTERVALHXFT_PSY_A_CORE
Patient is seen and evaluated for follow up of psychosis.    Chart reviewed and case discussed with treatment team.  Patient has continued to refuse the PO meds offered every night  Continues to state she will "NEVER take meds"  Received IM haloperidol 10 mg/diphenhydramine 50 mg /Lorazepam 2 mg nightly as per court order  Received Haloperidol Decanoate 75 mg IM 10/3 as per court order

## 2023-10-04 NOTE — BH INPATIENT PSYCHIATRY PROGRESS NOTE - CURRENT MEDICATION
MEDICATIONS  (STANDING):  diphenhydrAMINE 50 milliGRAM(s) Oral at bedtime  haloperidol     Tablet 10 milliGRAM(s) Oral at bedtime  LORazepam     Tablet 2 milliGRAM(s) Oral at bedtime    MEDICATIONS  (PRN):  diphenhydrAMINE 50 milliGRAM(s) Oral every 4 hours PRN agitation/EPS  diphenhydrAMINE Injectable 50 milliGRAM(s) IntraMuscular at bedtime PRN EPS Prohylaxis  diphenhydrAMINE Injectable 50 milliGRAM(s) IntraMuscular at bedtime PRN EPS Prohylaxis  diphenhydrAMINE Injectable 50 milliGRAM(s) IntraMuscular at bedtime PRN MOO for psychosis if pt declines Benadryl 50 mg PO  haloperidol     Tablet 5 milliGRAM(s) Oral every 4 hours PRN agitation  haloperidol    Injectable 10 milliGRAM(s) IntraMuscular at bedtime PRN psychosis MOO if patient declines Haldol 10 mg PO  haloperidol    Injectable 5 milliGRAM(s) IntraMuscular Once PRN agitation  LORazepam   Injectable 2 milliGRAM(s) IntraMuscular at bedtime PRN MOO for psychosis if pt declines Ativan 2 mg PO

## 2023-10-04 NOTE — BH INPATIENT PSYCHIATRY PROGRESS NOTE - NSBHCHARTREVIEWVS_PSY_A_CORE FT
Problem: Physiological Stability  Goal: Vital signs and physical assessments stable and within expected parameters  Outcome: Outcome Met, Continue evaluating goal progress toward completion  Goal: Remains free of signs and symptoms of infection  Outcome: Outcome Met, Continue evaluating goal progress toward completion  Goal: Parent / caregiver verbalizes understanding of NICU care related to patient-specific condition and treatment  Description: Document on Patient Education Activity  Outcome: Outcome Met, Continue evaluating goal progress toward completion  Goal: Parent / caregiver verbalizes / demonstrates comfort with their ability to care for infant and familiarity with community resources prior to discharge  Description: Document on Patient Education Activity  Outcome: Outcome Met, Continue evaluating goal progress toward completion     Problem: Thermoregulation  Goal: Body temperature maintained within normal range  Outcome: Outcome Met, Continue evaluating goal progress toward completion     Problem: Pain  Goal: Acceptable level of comfort exhibited by infant (based on N-Pass/NIPS Scoring)  Outcome: Outcome Met, Continue evaluating goal progress toward completion     Problem: Skin Integrity  Goal: Skin integrity is maintained or improved (skin will be intact without erythma or breakdown)  Outcome: Outcome Met, Continue evaluating goal progress toward completion  Goal: Wound size and drainage will decrease; surrounding tissue healthy and intact  Outcome: Outcome Met, Continue evaluating goal progress toward completion     Problem: Alteration in Family Bonding  Goal: Family Interaction is supported  Outcome: Outcome Met, Continue evaluating goal progress toward completion  Goal: Family demonstrates appropriate coping mechanisms  Outcome: Outcome Met, Continue evaluating goal progress toward completion     Problem: Nutrition  Goal: Tolerates feedings  Outcome: Outcome Met, Continue evaluating goal progress  toward completion  Goal: Parent/ caregiver verbalizes understanding of milk preparation,  storage and bottle feeding techniques  Description: Document on Patient Education Activity  Outcome: Outcome Met, Continue evaluating goal progress toward completion     Problem: Breastfeeding  Goal: Parent / caregiver verbalizes understanding of benefits of exclusive breastfeeding and breastfeeding techniques  Description: Document on Patient Education Activity  Outcome: Outcome Met, Continue evaluating goal progress toward completion     Problem: Nutrition  Goal: Consumes sufficient dietary intake without complications  Outcome: Outcome Not Met, Continue to Monitor  Goal: Progresses toward ability to receive all feeding from breast or bottle  Outcome: Outcome Not Met, Continue to Monitor  Goal: Achieves catch up weight gain and growth consistent with birth weight percentile  Outcome: Outcome Not Met, Continue to Monitor     Problem: Breastfeeding  Goal: Breast milk supply is established and maintained to provide breast milk to infant in accordance with mother's preference  Outcome: Outcome Not Met, Continue to Monitor  Goal: Successful breastfeeding as evidenced by proper latch and adequate suck/ swallow  Outcome: Outcome Not Met, Continue to Monitor      Vital Signs Last 24 Hrs  T(C): 36.6 (10-04-23 @ 06:23), Max: 36.6 (10-03-23 @ 07:58)  T(F): 97.8 (10-04-23 @ 06:23), Max: 97.9 (10-03-23 @ 07:58)  HR: --  BP: 130/77 (10-03-23 @ 07:58) (130/77 - 130/77)  BP(mean): 87 (10-03-23 @ 07:58) (87 - 87)  RR: --  SpO2: --    Orthostatic VS  10-04-23 @ 06:23  Lying BP: --/-- HR: --  Sitting BP: 146/82 HR: 85  Standing BP: --/-- HR: --  Site: --  Mode: --

## 2023-10-04 NOTE — BH INPATIENT PSYCHIATRY PROGRESS NOTE - PRN MEDS
MEDICATIONS  (PRN):  diphenhydrAMINE 50 milliGRAM(s) Oral every 4 hours PRN agitation/EPS  diphenhydrAMINE Injectable 50 milliGRAM(s) IntraMuscular at bedtime PRN EPS Prohylaxis  diphenhydrAMINE Injectable 50 milliGRAM(s) IntraMuscular at bedtime PRN EPS Prohylaxis  diphenhydrAMINE Injectable 50 milliGRAM(s) IntraMuscular at bedtime PRN MOO for psychosis if pt declines Benadryl 50 mg PO  haloperidol     Tablet 5 milliGRAM(s) Oral every 4 hours PRN agitation  haloperidol    Injectable 10 milliGRAM(s) IntraMuscular at bedtime PRN psychosis MOO if patient declines Haldol 10 mg PO  haloperidol    Injectable 5 milliGRAM(s) IntraMuscular Once PRN agitation  LORazepam   Injectable 2 milliGRAM(s) IntraMuscular at bedtime PRN MOO for psychosis if pt declines Ativan 2 mg PO

## 2023-10-05 PROCEDURE — 99232 SBSQ HOSP IP/OBS MODERATE 35: CPT

## 2023-10-05 RX ADMIN — HALOPERIDOL DECANOATE 10 MILLIGRAM(S): 100 INJECTION INTRAMUSCULAR at 20:45

## 2023-10-05 RX ADMIN — Medication 50 MILLIGRAM(S): at 20:45

## 2023-10-05 RX ADMIN — Medication 2 MILLIGRAM(S): at 20:46

## 2023-10-05 NOTE — BH INPATIENT PSYCHIATRY PROGRESS NOTE - NSBHCHARTREVIEWVS_PSY_A_CORE FT
Vital Signs Last 24 Hrs  T(C): 36.4 (10-05-23 @ 07:42), Max: 36.4 (10-05-23 @ 07:42)  T(F): 97.5 (10-05-23 @ 07:42), Max: 97.5 (10-05-23 @ 07:42)  HR: 86 (10-05-23 @ 07:42) (86 - 86)  BP: 146/94 (10-05-23 @ 07:42) (146/94 - 146/94)  BP(mean): --  RR: --  SpO2: --    Orthostatic VS  10-04-23 @ 06:23  Lying BP: --/-- HR: --  Sitting BP: 146/82 HR: 85  Standing BP: --/-- HR: --  Site: --  Mode: --

## 2023-10-05 NOTE — BH INPATIENT PSYCHIATRY PROGRESS NOTE - NSBHASSESSSUMMFT_PSY_ALL_CORE
58F,  PPH of schizophrenia, not in current treatment, previously inpatient [place] resident in 2004, denies prior suicide attempt, denies substance use history, denies violence/legal issues, no known PMH, brought in by EMS; activated by daughter for bizarre behavior and worsening psychotic symptoms    10/05 Update  Patient remains uncooperative, irritable, paranoid on examination   Refused PO meds and again received IM meds as per court order  Patient with significant thought disorder, grandiose and Buddhism delusions  TOO Granted 9/26 and will continue with haloperidol trial as per court order  Receiving  IM haloperidol 10 mg/diphenhydramine 50 mg /Lorazepam 2 mg nightly as per court order as still refusing PO Meds  Received Haloperidol Decanoate 75 mg IM 10/3 as per court order    Impression  Exacerbation of schizophrenia in the context of noncompliance    PLAN  Patient requires acute inpatient care for treatment of psychosis.   Patient admitted on a 939 emergency status 9/13 and converted to 927 on 9/15 for the purpose of TOO  Administrative meeting for TOO 9/20 and for Court Hearing 9/26 which TOO was granted and medication started  Haloperidol 10 mg /diphenhydramine 50 mg /Lorazepam 2 mg as per court order PO offered, IM if refused  10/3 Haloperidol Decanoate JUAN 75 mg   Patient does not require constant observation at this time and will follow with 15 minute checks.  Treatment will include individual therapy/supportive therapy/ rehab therapy/ psychopharmacological therapy and milieu therapy

## 2023-10-05 NOTE — BH INPATIENT PSYCHIATRY PROGRESS NOTE - MSE UNSTRUCTURED FT
On exam today the patient remains with minimal clinical change   She is irritable and uncooperative with questions and refuses to engage any of the staff   Speech is loud, rapid and pressured and screams when she is approached.  Thought process: with disorder of thought process.    Thought content: with paranoid and Buddhist delusional beliefs yelling at staff to talk with Les only about her story.   Affect: labile from flat to angry  Patient refuses to answer questions about Suicide, homicide, perceptual disturbances, Mood, or Memory  Patient is Alert and oriented   Insight and judgment are impaired. Impulse control is tenuous at this time

## 2023-10-05 NOTE — BH INPATIENT PSYCHIATRY PROGRESS NOTE - PRN MEDS
MEDICATIONS  (PRN):  diphenhydrAMINE 50 milliGRAM(s) Oral every 4 hours PRN agitation/EPS  diphenhydrAMINE Injectable 50 milliGRAM(s) IntraMuscular at bedtime PRN EPS Prohylaxis  diphenhydrAMINE Injectable 50 milliGRAM(s) IntraMuscular at bedtime PRN EPS Prohylaxis  diphenhydrAMINE Injectable 50 milliGRAM(s) IntraMuscular at bedtime PRN MOO for psychosis if pt declines Benadryl 50 mg PO  haloperidol     Tablet 5 milliGRAM(s) Oral every 4 hours PRN agitation  haloperidol    Injectable 5 milliGRAM(s) IntraMuscular Once PRN agitation  haloperidol    Injectable 10 milliGRAM(s) IntraMuscular at bedtime PRN psychosis MOO if patient declines Haldol 10 mg PO

## 2023-10-05 NOTE — BH INPATIENT PSYCHIATRY PROGRESS NOTE - NSBHFUPINTERVALHXFT_PSY_A_CORE
Patient is seen and evaluated for follow up of psychosis.    Chart reviewed and case discussed with treatment team.  Patient has continued to refuse the PO meds offered every night  Continues to received IM haloperidol 10 mg/diphenhydramine 50 mg /Lorazepam 2 mg nightly as per court order  Unwilling to interact with staff or peers

## 2023-10-05 NOTE — BH INPATIENT PSYCHIATRY PROGRESS NOTE - CURRENT MEDICATION
MEDICATIONS  (STANDING):  diphenhydrAMINE 50 milliGRAM(s) Oral at bedtime  haloperidol     Tablet 10 milliGRAM(s) Oral at bedtime    MEDICATIONS  (PRN):  diphenhydrAMINE 50 milliGRAM(s) Oral every 4 hours PRN agitation/EPS  diphenhydrAMINE Injectable 50 milliGRAM(s) IntraMuscular at bedtime PRN EPS Prohylaxis  diphenhydrAMINE Injectable 50 milliGRAM(s) IntraMuscular at bedtime PRN EPS Prohylaxis  diphenhydrAMINE Injectable 50 milliGRAM(s) IntraMuscular at bedtime PRN MOO for psychosis if pt declines Benadryl 50 mg PO  haloperidol     Tablet 5 milliGRAM(s) Oral every 4 hours PRN agitation  haloperidol    Injectable 5 milliGRAM(s) IntraMuscular Once PRN agitation  haloperidol    Injectable 10 milliGRAM(s) IntraMuscular at bedtime PRN psychosis MOO if patient declines Haldol 10 mg PO

## 2023-10-05 NOTE — BH INPATIENT PSYCHIATRY PROGRESS NOTE - NSBHMETABOLIC_PSY_ALL_CORE_FT
BMI:   HbA1c:   Glucose:   BP: 146/94 (10-05-23 @ 07:42) (130/77 - 146/94)  Lipid Panel: Date/Time: 09-14-23 @ 09:30  Cholesterol, Serum: 229  Direct LDL: --  HDL Cholesterol, Serum: 62  Total Cholesterol/HDL Ration Measurement: --  Triglycerides, Serum: 96

## 2023-10-06 PROCEDURE — 99232 SBSQ HOSP IP/OBS MODERATE 35: CPT

## 2023-10-06 RX ADMIN — Medication 50 MILLIGRAM(S): at 21:15

## 2023-10-06 RX ADMIN — HALOPERIDOL DECANOATE 10 MILLIGRAM(S): 100 INJECTION INTRAMUSCULAR at 21:15

## 2023-10-06 RX ADMIN — Medication 2 MILLIGRAM(S): at 21:15

## 2023-10-06 NOTE — BH INPATIENT PSYCHIATRY PROGRESS NOTE - NSBHASSESSSUMMFT_PSY_ALL_CORE
58F,  PPH of schizophrenia, not in current treatment, previously inpatient [place] resident in 2004, denies prior suicide attempt, denies substance use history, denies violence/legal issues, no known PMH, brought in by EMS; activated by daughter for bizarre behavior and worsening psychotic symptoms    10/05 Update  Patient remains uncooperative, irritable, paranoid on examination   Refused PO meds and again received IM meds as per court order  Patient with significant thought disorder, grandiose and Confucianist delusions  TOO Granted 9/26 and will continue with haloperidol trial as per court order  Receiving  IM haloperidol 10 mg/diphenhydramine 50 mg /Lorazepam 2 mg nightly as per court order as still refusing PO Meds  Received Haloperidol Decanoate 75 mg IM 10/3 as per court order    Impression  Exacerbation of schizophrenia in the context of noncompliance   10/6  No evidence of meaningful clinical change after 10 days of haloperidol.  Will continue with trial   PLAN  Patient requires acute inpatient care for treatment of psychosis.   Patient admitted on a 939 emergency status 9/13 and converted to 927 on 9/15 for the purpose of TOO  Administrative meeting for TOO 9/20 and for Court Hearing 9/26 which TOO was granted and medication started  Haloperidol 10 mg /diphenhydramine 50 mg /Lorazepam 2 mg as per court order PO offered, IM if refused  10/3 Haloperidol Decanoate JUAN 75 mg   Patient does not require constant observation at this time and will follow with 15 minute checks.  Treatment will include individual therapy/supportive therapy/ rehab therapy/ psychopharmacological therapy and milieu therapy

## 2023-10-06 NOTE — BH INPATIENT PSYCHIATRY PROGRESS NOTE - PRN MEDS
MEDICATIONS  (PRN):  diphenhydrAMINE 50 milliGRAM(s) Oral every 4 hours PRN agitation/EPS  diphenhydrAMINE Injectable 50 milliGRAM(s) IntraMuscular at bedtime PRN EPS Prohylaxis  diphenhydrAMINE Injectable 50 milliGRAM(s) IntraMuscular at bedtime PRN EPS Prohylaxis  diphenhydrAMINE Injectable 50 milliGRAM(s) IntraMuscular at bedtime PRN MOO for psychosis if pt declines Benadryl 50 mg PO  haloperidol     Tablet 5 milliGRAM(s) Oral every 4 hours PRN agitation  haloperidol    Injectable 5 milliGRAM(s) IntraMuscular Once PRN agitation  haloperidol    Injectable 10 milliGRAM(s) IntraMuscular at bedtime PRN psychosis MOO if patient declines Haldol 10 mg PO  LORazepam   Injectable 2 milliGRAM(s) IntraMuscular at bedtime PRN MOO for psychosis if pt declines Ativan 2 mg PO

## 2023-10-06 NOTE — BH INPATIENT PSYCHIATRY PROGRESS NOTE - NSBHFUPINTERVALHXFT_PSY_A_CORE
Patient is seen and evaluated for follow up of psychosis.    Chart reviewed and case discussed with treatment team.  Patient has continued to display minimal if any clinical change and continues to refuse the PO meds offered every night  Continues to received IM haloperidol 10 mg/diphenhydramine 50 mg /Lorazepam 2 mg nightly as per court order  Unwilling to interact with staff or peers or talk to writer at all

## 2023-10-06 NOTE — BH INPATIENT PSYCHIATRY PROGRESS NOTE - MSE UNSTRUCTURED FT
On exam today the patient remains with minimal if any clinical change   She is irritable and uncooperative with questions and refuses to engage with writer or any of the staff   Speech is loud, rapid and pressured and screams when she is approached.  Thought process: with disorder of thought process.    Thought content: with paranoid and Advent delusional beliefs yelling at staff to talk with Les only about her story.   Affect: labile from flat to angry  Patient refuses to answer questions about Suicide, homicide, perceptual disturbances, Mood, or Memory  Patient is Alert and oriented   Insight and judgment are impaired. Impulse control is tenuous at this time

## 2023-10-06 NOTE — BH INPATIENT PSYCHIATRY PROGRESS NOTE - NSBHMETABOLIC_PSY_ALL_CORE_FT
BMI:   HbA1c:   Glucose:   BP: 153/86 (10-05-23 @ 19:38) (130/77 - 153/86)  Lipid Panel: Date/Time: 09-14-23 @ 09:30  Cholesterol, Serum: 229  Direct LDL: --  HDL Cholesterol, Serum: 62  Total Cholesterol/HDL Ration Measurement: --  Triglycerides, Serum: 96

## 2023-10-06 NOTE — BH INPATIENT PSYCHIATRY PROGRESS NOTE - CURRENT MEDICATION
MEDICATIONS  (STANDING):  diphenhydrAMINE 50 milliGRAM(s) Oral at bedtime  haloperidol     Tablet 10 milliGRAM(s) Oral at bedtime  LORazepam     Tablet 2 milliGRAM(s) Oral at bedtime    MEDICATIONS  (PRN):  diphenhydrAMINE 50 milliGRAM(s) Oral every 4 hours PRN agitation/EPS  diphenhydrAMINE Injectable 50 milliGRAM(s) IntraMuscular at bedtime PRN EPS Prohylaxis  diphenhydrAMINE Injectable 50 milliGRAM(s) IntraMuscular at bedtime PRN EPS Prohylaxis  diphenhydrAMINE Injectable 50 milliGRAM(s) IntraMuscular at bedtime PRN MOO for psychosis if pt declines Benadryl 50 mg PO  haloperidol     Tablet 5 milliGRAM(s) Oral every 4 hours PRN agitation  haloperidol    Injectable 5 milliGRAM(s) IntraMuscular Once PRN agitation  haloperidol    Injectable 10 milliGRAM(s) IntraMuscular at bedtime PRN psychosis MOO if patient declines Haldol 10 mg PO  LORazepam   Injectable 2 milliGRAM(s) IntraMuscular at bedtime PRN MOO for psychosis if pt declines Ativan 2 mg PO

## 2023-10-07 RX ADMIN — Medication 2 MILLIGRAM(S): at 21:05

## 2023-10-07 RX ADMIN — HALOPERIDOL DECANOATE 10 MILLIGRAM(S): 100 INJECTION INTRAMUSCULAR at 21:05

## 2023-10-07 RX ADMIN — Medication 50 MILLIGRAM(S): at 21:05

## 2023-10-08 RX ADMIN — Medication 50 MILLIGRAM(S): at 20:13

## 2023-10-08 RX ADMIN — HALOPERIDOL DECANOATE 10 MILLIGRAM(S): 100 INJECTION INTRAMUSCULAR at 20:13

## 2023-10-08 RX ADMIN — Medication 2 MILLIGRAM(S): at 20:13

## 2023-10-09 PROCEDURE — 99232 SBSQ HOSP IP/OBS MODERATE 35: CPT

## 2023-10-09 RX ADMIN — Medication 2 MILLIGRAM(S): at 20:37

## 2023-10-09 RX ADMIN — HALOPERIDOL DECANOATE 10 MILLIGRAM(S): 100 INJECTION INTRAMUSCULAR at 20:36

## 2023-10-09 RX ADMIN — Medication 50 MILLIGRAM(S): at 20:36

## 2023-10-09 NOTE — BH INPATIENT PSYCHIATRY PROGRESS NOTE - PRN MEDS
MEDICATIONS  (PRN):  diphenhydrAMINE 50 milliGRAM(s) Oral every 4 hours PRN agitation/EPS  diphenhydrAMINE Injectable 50 milliGRAM(s) IntraMuscular at bedtime PRN MOO for psychosis if pt declines Benadryl 50 mg PO  diphenhydrAMINE Injectable 50 milliGRAM(s) IntraMuscular at bedtime PRN EPS Prohylaxis  diphenhydrAMINE Injectable 50 milliGRAM(s) IntraMuscular at bedtime PRN EPS Prohylaxis  haloperidol     Tablet 5 milliGRAM(s) Oral every 4 hours PRN agitation  haloperidol    Injectable 10 milliGRAM(s) IntraMuscular at bedtime PRN psychosis MOO if patient declines Haldol 10 mg PO  haloperidol    Injectable 5 milliGRAM(s) IntraMuscular Once PRN agitation  LORazepam   Injectable 2 milliGRAM(s) IntraMuscular at bedtime PRN MOO for psychosis if pt declines Ativan 2 mg PO

## 2023-10-09 NOTE — BH INPATIENT PSYCHIATRY PROGRESS NOTE - CURRENT MEDICATION
MEDICATIONS  (STANDING):  diphenhydrAMINE 50 milliGRAM(s) Oral at bedtime  haloperidol     Tablet 10 milliGRAM(s) Oral at bedtime  LORazepam     Tablet 2 milliGRAM(s) Oral at bedtime    MEDICATIONS  (PRN):  diphenhydrAMINE 50 milliGRAM(s) Oral every 4 hours PRN agitation/EPS  diphenhydrAMINE Injectable 50 milliGRAM(s) IntraMuscular at bedtime PRN MOO for psychosis if pt declines Benadryl 50 mg PO  diphenhydrAMINE Injectable 50 milliGRAM(s) IntraMuscular at bedtime PRN EPS Prohylaxis  diphenhydrAMINE Injectable 50 milliGRAM(s) IntraMuscular at bedtime PRN EPS Prohylaxis  haloperidol     Tablet 5 milliGRAM(s) Oral every 4 hours PRN agitation  haloperidol    Injectable 10 milliGRAM(s) IntraMuscular at bedtime PRN psychosis MOO if patient declines Haldol 10 mg PO  haloperidol    Injectable 5 milliGRAM(s) IntraMuscular Once PRN agitation  LORazepam   Injectable 2 milliGRAM(s) IntraMuscular at bedtime PRN MOO for psychosis if pt declines Ativan 2 mg PO

## 2023-10-09 NOTE — BH INPATIENT PSYCHIATRY PROGRESS NOTE - NSBHCHARTREVIEWVS_PSY_A_CORE FT
Vital Signs Last 24 Hrs  T(C): 35.7 (10-09-23 @ 07:12), Max: 35.7 (10-09-23 @ 07:12)  T(F): 96.2 (10-09-23 @ 07:12), Max: 96.2 (10-09-23 @ 07:12)  HR: 88 (10-09-23 @ 07:12) (88 - 88)  BP: 146/79 (10-09-23 @ 07:12) (146/79 - 146/79)  BP(mean): --  RR: --  SpO2: --

## 2023-10-09 NOTE — BH INPATIENT PSYCHIATRY PROGRESS NOTE - MSE UNSTRUCTURED FT
On exam today the patient remains with minimal if any clinical change   She is irritable and uncooperative with questions and refuses to engage with writer or any of the staff   Speech is loud, rapid and pressured and screams when she is approached.  Thought process: with disorder of thought process.    Thought content: with paranoid and Lutheran delusional beliefs yelling at staff to talk with Les only about her story.   Affect: labile from flat to angry  Patient refuses to answer questions about Suicide, homicide, perceptual disturbances, Mood, or Memory  Patient is Alert and oriented   Insight and judgment are impaired. Impulse control is tenuous at this time

## 2023-10-09 NOTE — BH INPATIENT PSYCHIATRY PROGRESS NOTE - NSBHMETABOLIC_PSY_ALL_CORE_FT
BMI:   HbA1c:   Glucose:   BP: 146/79 (10-09-23 @ 07:12) (133/77 - 146/79)  Lipid Panel: Date/Time: 09-14-23 @ 09:30  Cholesterol, Serum: 229  Direct LDL: --  HDL Cholesterol, Serum: 62  Total Cholesterol/HDL Ration Measurement: --  Triglycerides, Serum: 96

## 2023-10-09 NOTE — BH INPATIENT PSYCHIATRY PROGRESS NOTE - NSBHASSESSSUMMFT_PSY_ALL_CORE
58F,  PPH of schizophrenia, not in current treatment, previously inpatient [place] resident in 2004, denies prior suicide attempt, denies substance use history, denies violence/legal issues, no known PMH, brought in by EMS; activated by daughter for bizarre behavior and worsening psychotic symptoms    10/09 Update  Patient remains uncooperative, irritable, paranoid   Refused PO meds and every night has received IM meds as per court order  Patient with significant thought disorder, grandiose and Buddhist delusions  TOO Granted 9/26 and will continue with haloperidol trial as per court order  Receiving  IM haloperidol 10 mg/diphenhydramine 50 mg /Lorazepam 2 mg nightly as per court order as still refusing PO Meds  Received Haloperidol Decanoate 75 mg IM 10/3 as per court order    Impression  Exacerbation of schizophrenia in the context of noncompliance     PLAN  Patient requires acute inpatient care for treatment of psychosis.   Patient admitted on a 939 emergency status 9/13 and converted to 927 on 9/15 for the purpose of TOO  Administrative meeting for TOO 9/20 and for Court Hearing 9/26 which TOO was granted and medication started  Haloperidol 10 mg /diphenhydramine 50 mg /Lorazepam 2 mg as per court order PO offered, IM if refused  10/3 Haloperidol Decanoate JUAN 75 mg   Patient does not require constant observation at this time and will follow with 15 minute checks.  Treatment will include individual therapy/supportive therapy/ rehab therapy/ psychopharmacological therapy and milieu therapy

## 2023-10-09 NOTE — BH INPATIENT PSYCHIATRY PROGRESS NOTE - NSBHFUPINTERVALHXFT_PSY_A_CORE
Patient is seen and evaluated for follow up of psychosis.    Chart reviewed and case discussed with treatment team.  Patient has continued to be delusional and irritable  Has continued to refuse the PO meds offered every night despite court order  Continues to received IM haloperidol 10 mg/diphenhydramine 50 mg /Lorazepam 2 mg nightly as per court order  Unwilling to interact with staff or peers or talk to writer at all

## 2023-10-10 LAB
ALBUMIN SERPL ELPH-MCNC: 4.1 G/DL — SIGNIFICANT CHANGE UP (ref 3.3–5)
ALP SERPL-CCNC: 77 U/L — SIGNIFICANT CHANGE UP (ref 40–120)
ALT FLD-CCNC: 23 U/L — SIGNIFICANT CHANGE UP (ref 4–33)
ANION GAP SERPL CALC-SCNC: 10 MMOL/L — SIGNIFICANT CHANGE UP (ref 7–14)
AST SERPL-CCNC: 21 U/L — SIGNIFICANT CHANGE UP (ref 4–32)
BASOPHILS # BLD AUTO: 0.03 K/UL — SIGNIFICANT CHANGE UP (ref 0–0.2)
BASOPHILS NFR BLD AUTO: 0.4 % — SIGNIFICANT CHANGE UP (ref 0–2)
BILIRUB SERPL-MCNC: <0.2 MG/DL — SIGNIFICANT CHANGE UP (ref 0.2–1.2)
BUN SERPL-MCNC: 20 MG/DL — SIGNIFICANT CHANGE UP (ref 7–23)
CALCIUM SERPL-MCNC: 9.6 MG/DL — SIGNIFICANT CHANGE UP (ref 8.4–10.5)
CHLORIDE SERPL-SCNC: 103 MMOL/L — SIGNIFICANT CHANGE UP (ref 98–107)
CO2 SERPL-SCNC: 28 MMOL/L — SIGNIFICANT CHANGE UP (ref 22–31)
CREAT SERPL-MCNC: 0.97 MG/DL — SIGNIFICANT CHANGE UP (ref 0.5–1.3)
EGFR: 69 ML/MIN/1.73M2 — SIGNIFICANT CHANGE UP
EOSINOPHIL # BLD AUTO: 0.3 K/UL — SIGNIFICANT CHANGE UP (ref 0–0.5)
EOSINOPHIL NFR BLD AUTO: 4.3 % — SIGNIFICANT CHANGE UP (ref 0–6)
GLUCOSE SERPL-MCNC: 97 MG/DL — SIGNIFICANT CHANGE UP (ref 70–99)
HCT VFR BLD CALC: 40.6 % — SIGNIFICANT CHANGE UP (ref 34.5–45)
HGB BLD-MCNC: 12.8 G/DL — SIGNIFICANT CHANGE UP (ref 11.5–15.5)
IANC: 2.78 K/UL — SIGNIFICANT CHANGE UP (ref 1.8–7.4)
IMM GRANULOCYTES NFR BLD AUTO: 0.7 % — SIGNIFICANT CHANGE UP (ref 0–0.9)
LYMPHOCYTES # BLD AUTO: 2.92 K/UL — SIGNIFICANT CHANGE UP (ref 1–3.3)
LYMPHOCYTES # BLD AUTO: 42.3 % — SIGNIFICANT CHANGE UP (ref 13–44)
MCHC RBC-ENTMCNC: 26 PG — LOW (ref 27–34)
MCHC RBC-ENTMCNC: 31.5 GM/DL — LOW (ref 32–36)
MCV RBC AUTO: 82.5 FL — SIGNIFICANT CHANGE UP (ref 80–100)
MONOCYTES # BLD AUTO: 0.83 K/UL — SIGNIFICANT CHANGE UP (ref 0–0.9)
MONOCYTES NFR BLD AUTO: 12 % — SIGNIFICANT CHANGE UP (ref 2–14)
NEUTROPHILS # BLD AUTO: 2.78 K/UL — SIGNIFICANT CHANGE UP (ref 1.8–7.4)
NEUTROPHILS NFR BLD AUTO: 40.3 % — LOW (ref 43–77)
NRBC # BLD: 0 /100 WBCS — SIGNIFICANT CHANGE UP (ref 0–0)
NRBC # FLD: 0 K/UL — SIGNIFICANT CHANGE UP (ref 0–0)
PLATELET # BLD AUTO: 284 K/UL — SIGNIFICANT CHANGE UP (ref 150–400)
POTASSIUM SERPL-MCNC: 4.5 MMOL/L — SIGNIFICANT CHANGE UP (ref 3.5–5.3)
POTASSIUM SERPL-SCNC: 4.5 MMOL/L — SIGNIFICANT CHANGE UP (ref 3.5–5.3)
PROT SERPL-MCNC: 7.8 G/DL — SIGNIFICANT CHANGE UP (ref 6–8.3)
RBC # BLD: 4.92 M/UL — SIGNIFICANT CHANGE UP (ref 3.8–5.2)
RBC # FLD: 14.5 % — SIGNIFICANT CHANGE UP (ref 10.3–14.5)
SODIUM SERPL-SCNC: 141 MMOL/L — SIGNIFICANT CHANGE UP (ref 135–145)
WBC # BLD: 6.91 K/UL — SIGNIFICANT CHANGE UP (ref 3.8–10.5)
WBC # FLD AUTO: 6.91 K/UL — SIGNIFICANT CHANGE UP (ref 3.8–10.5)

## 2023-10-10 RX ADMIN — Medication 2 MILLIGRAM(S): at 20:46

## 2023-10-10 RX ADMIN — Medication 50 MILLIGRAM(S): at 20:45

## 2023-10-10 RX ADMIN — HALOPERIDOL DECANOATE 10 MILLIGRAM(S): 100 INJECTION INTRAMUSCULAR at 20:46

## 2023-10-10 NOTE — BH INPATIENT PSYCHIATRY PROGRESS NOTE - NSBHCHARTREVIEWVS_PSY_A_CORE FT
Vital Signs Last 24 Hrs  T(C): 36.6 (10-10-23 @ 08:07), Max: 36.6 (10-10-23 @ 08:07)  T(F): 97.9 (10-10-23 @ 08:07), Max: 97.9 (10-10-23 @ 08:07)  HR: 87 (10-10-23 @ 08:07) (87 - 87)  BP: 133/73 (10-10-23 @ 08:07) (133/73 - 133/73)  BP(mean): --  RR: --  SpO2: --

## 2023-10-10 NOTE — BH INPATIENT PSYCHIATRY PROGRESS NOTE - MSE UNSTRUCTURED FT
On exam today the patient remains with minimal if any clinical change   She is irritable and uncooperative with questions and refuses to engage with writer or any of the staff   Speech is loud, rapid and pressured and screams when she is approached.  Thought process: with disorder of thought process.    Thought content: with paranoid and Moravian delusional beliefs yelling at staff to talk with Les only about her story.   Affect: labile from flat to angry  Patient refuses to answer questions about Suicide, homicide, perceptual disturbances, Mood, or Memory  Patient is Alert and oriented   Insight and judgment are impaired. Impulse control is tenuous at this time

## 2023-10-10 NOTE — BH INPATIENT PSYCHIATRY PROGRESS NOTE - NSBHFUPINTERVALHXFT_PSY_A_CORE
Patient is seen and evaluated for follow up of psychosis.    Chart reviewed and case discussed with treatment team.  Patient has continued to be delusional and irritable  Has continued to refuse the PO meds offered every night despite court order  Continues to received IM haloperidol 10 mg/diphenhydramine 50 mg /Lorazepam 2 mg nightly as per court order  Remains unwilling to interact with staff or peers or talk to writer at all

## 2023-10-10 NOTE — BH INPATIENT PSYCHIATRY PROGRESS NOTE - NSBHMETABOLIC_PSY_ALL_CORE_FT
BMI:   HbA1c:   Glucose:   BP: 133/73 (10-10-23 @ 08:07) (133/73 - 146/79)  Lipid Panel: Date/Time: 09-14-23 @ 09:30  Cholesterol, Serum: 229  Direct LDL: --  HDL Cholesterol, Serum: 62  Total Cholesterol/HDL Ration Measurement: --  Triglycerides, Serum: 96

## 2023-10-10 NOTE — BH INPATIENT PSYCHIATRY PROGRESS NOTE - NSBHASSESSSUMMFT_PSY_ALL_CORE
58F,  PPH of schizophrenia, not in current treatment, previously inpatient [place] resident in 2004, denies prior suicide attempt, denies substance use history, denies violence/legal issues, no known PMH, brought in by EMS; activated by daughter for bizarre behavior and worsening psychotic symptoms    10/10 Update  Patient remains uncooperative, irritable, paranoid   Refused PO meds and every night has received IM meds as per court order  Patient with significant thought disorder, grandiose and Sikh delusions  TOO Granted 9/26 and will continue with haloperidol trial as per court order  Receiving  IM haloperidol 10 mg/diphenhydramine 50 mg /Lorazepam 2 mg nightly as per court order as still refusing PO Meds  Received Haloperidol Decanoate 75 mg IM 10/3 as per court order    Impression  Exacerbation of schizophrenia in the context of noncompliance     PLAN  Patient requires acute inpatient care for treatment of psychosis.   Patient admitted on a 939 emergency status 9/13 and converted to 927 on 9/15 for the purpose of TOO  Administrative meeting for TOO 9/20 and for Court Hearing 9/26 which TOO was granted and medication started  Haloperidol 10 mg /diphenhydramine 50 mg /Lorazepam 2 mg as per court order PO offered, IM if refused  10/3 Haloperidol Decanoate JUAN 75 mg   Patient does not require constant observation at this time and will follow with 15 minute checks.  Treatment will include individual therapy/supportive therapy/ rehab therapy/ psychopharmacological therapy and milieu therapy

## 2023-10-11 PROCEDURE — 99232 SBSQ HOSP IP/OBS MODERATE 35: CPT

## 2023-10-11 RX ADMIN — Medication 50 MILLIGRAM(S): at 20:13

## 2023-10-11 RX ADMIN — Medication 2 MILLIGRAM(S): at 20:12

## 2023-10-11 RX ADMIN — HALOPERIDOL DECANOATE 10 MILLIGRAM(S): 100 INJECTION INTRAMUSCULAR at 20:12

## 2023-10-11 NOTE — BH INPATIENT PSYCHIATRY PROGRESS NOTE - CURRENT MEDICATION
MEDICATIONS  (STANDING):  diphenhydrAMINE 50 milliGRAM(s) Oral at bedtime  haloperidol     Tablet 10 milliGRAM(s) Oral at bedtime  LORazepam     Tablet 2 milliGRAM(s) Oral at bedtime    MEDICATIONS  (PRN):  diphenhydrAMINE 50 milliGRAM(s) Oral every 4 hours PRN agitation/EPS  diphenhydrAMINE Injectable 50 milliGRAM(s) IntraMuscular at bedtime PRN MOO for psychosis if pt declines Benadryl 50 mg PO  diphenhydrAMINE Injectable 50 milliGRAM(s) IntraMuscular at bedtime PRN EPS Prohylaxis  diphenhydrAMINE Injectable 50 milliGRAM(s) IntraMuscular at bedtime PRN EPS Prohylaxis  haloperidol     Tablet 5 milliGRAM(s) Oral every 4 hours PRN agitation  haloperidol    Injectable 5 milliGRAM(s) IntraMuscular Once PRN agitation  haloperidol    Injectable 10 milliGRAM(s) IntraMuscular at bedtime PRN psychosis MOO if patient declines Haldol 10 mg PO  LORazepam   Injectable 2 milliGRAM(s) IntraMuscular at bedtime PRN MOO for psychosis if pt declines Ativan 2 mg PO

## 2023-10-11 NOTE — BH INPATIENT PSYCHIATRY PROGRESS NOTE - MSE UNSTRUCTURED FT
On exam today the patient remains with slight clinical improvement    She is irritable and uncooperative with questions and refuses to engage with writer or any of the staff   Speech is loud, rapid and pressured but no longer screaming when she is approached.  Thought process: with disorder of thought process.    Thought content: with paranoid and Tenriism delusional beliefs yelling at staff to talk with Les only about her story.   Affect: labile from flat to angry  Patient refuses to answer questions about Suicide, homicide, perceptual disturbances, Mood, or Memory  Patient is Alert and oriented   Insight and judgment are impaired. Impulse control is tenuous at this time

## 2023-10-11 NOTE — BH INPATIENT PSYCHIATRY PROGRESS NOTE - PRN MEDS
MEDICATIONS  (PRN):  diphenhydrAMINE 50 milliGRAM(s) Oral every 4 hours PRN agitation/EPS  diphenhydrAMINE Injectable 50 milliGRAM(s) IntraMuscular at bedtime PRN MOO for psychosis if pt declines Benadryl 50 mg PO  diphenhydrAMINE Injectable 50 milliGRAM(s) IntraMuscular at bedtime PRN EPS Prohylaxis  diphenhydrAMINE Injectable 50 milliGRAM(s) IntraMuscular at bedtime PRN EPS Prohylaxis  haloperidol     Tablet 5 milliGRAM(s) Oral every 4 hours PRN agitation  haloperidol    Injectable 5 milliGRAM(s) IntraMuscular Once PRN agitation  haloperidol    Injectable 10 milliGRAM(s) IntraMuscular at bedtime PRN psychosis MOO if patient declines Haldol 10 mg PO  LORazepam   Injectable 2 milliGRAM(s) IntraMuscular at bedtime PRN MOO for psychosis if pt declines Ativan 2 mg PO

## 2023-10-11 NOTE — BH INPATIENT PSYCHIATRY PROGRESS NOTE - NSBHASSESSSUMMFT_PSY_ALL_CORE
58F,  PPH of schizophrenia, not in current treatment, previously inpatient [place] resident in 2004, denies prior suicide attempt, denies substance use history, denies violence/legal issues, no known PMH, brought in by EMS; activated by daughter for bizarre behavior and worsening psychotic symptoms    10/11 Update  Patient remains uncooperative, irritable, paranoid   Refused PO meds and every night has received IM meds as per court order  Patient with significant thought disorder, grandiose and Bahai delusions  TOO Granted 9/26 and will continue with haloperidol trial as per court order  Receiving  IM haloperidol 10 mg/diphenhydramine 50 mg /Lorazepam 2 mg nightly as per court order as still refusing PO Meds  Received Haloperidol Decanoate 75 mg IM 10/3 as per court order    Impression  Exacerbation of schizophrenia in the context of noncompliance     PLAN  Patient requires acute inpatient care for treatment of psychosis.   Patient admitted on a 939 emergency status 9/13 and converted to 927 on 9/15 for the purpose of TOO  Administrative meeting for TOO 9/20 and for Court Hearing 9/26 which TOO was granted and medication started  Haloperidol 10 mg /diphenhydramine 50 mg /Lorazepam 2 mg as per court order PO offered, IM if refused  10/3 Haloperidol Decanoate JUAN 75 mg   Patient does not require constant observation at this time and will follow with 15 minute checks.  Treatment will include individual therapy/supportive therapy/ rehab therapy/ psychopharmacological therapy and milieu therapy

## 2023-10-12 PROCEDURE — 99232 SBSQ HOSP IP/OBS MODERATE 35: CPT

## 2023-10-12 RX ADMIN — Medication 50 MILLIGRAM(S): at 20:43

## 2023-10-12 RX ADMIN — Medication 2 MILLIGRAM(S): at 20:43

## 2023-10-12 RX ADMIN — HALOPERIDOL DECANOATE 10 MILLIGRAM(S): 100 INJECTION INTRAMUSCULAR at 20:43

## 2023-10-12 NOTE — BH INPATIENT PSYCHIATRY PROGRESS NOTE - NSBHMETABOLIC_PSY_ALL_CORE_FT
BMI:   HbA1c:   Glucose:   BP: 133/73 (10-10-23 @ 08:07) (133/73 - 133/73)  Lipid Panel: Date/Time: 09-14-23 @ 09:30  Cholesterol, Serum: 229  Direct LDL: --  HDL Cholesterol, Serum: 62  Total Cholesterol/HDL Ration Measurement: --  Triglycerides, Serum: 96

## 2023-10-12 NOTE — BH INPATIENT PSYCHIATRY PROGRESS NOTE - NSBHFUPINTERVALHXFT_PSY_A_CORE
Patient is seen and evaluated for follow up of psychosis.    Chart reviewed and case discussed with treatment team.  Patient has continued to be delusional but less irritable  Has continued to refuse the PO meds offered every night despite court order  Continues to received IM haloperidol 10 mg/diphenhydramine 50 mg /Lorazepam 2 mg nightly as per court order  Remains unwilling to interact with staff or peers or talk to writer at all but no longer screaming at me daily

## 2023-10-12 NOTE — BH INPATIENT PSYCHIATRY PROGRESS NOTE - MSE UNSTRUCTURED FT
On exam today the patient remains with slight clinical improvement    She is less irritable though still uncooperative with questions and refuses to engage with writer or any of the staff   Speech is loud, rapid and pressured but no longer screaming when she is approached.    Thought process: with disorder of thought process.    Thought content: with paranoid and Baptism delusional beliefs yelling at staff to talk with Les only about her story.   Affect: labile from flat to angry  Patient refuses to answer questions about Suicide, homicide, perceptual disturbances, Mood, or Memory  Patient is Alert and oriented   Insight and judgment are impaired. Impulse control is tenuous at this time

## 2023-10-12 NOTE — BH INPATIENT PSYCHIATRY PROGRESS NOTE - NSBHASSESSSUMMFT_PSY_ALL_CORE
58F,  PPH of schizophrenia, not in current treatment, previously inpatient [place] resident in 2004, denies prior suicide attempt, denies substance use history, denies violence/legal issues, no known PMH, brought in by EMS; activated by daughter for bizarre behavior and worsening psychotic symptoms    10/12 Update  Patient remains uncooperative, but less irritable, paranoid   Refused PO meds and every night has received IM meds as per court order  Patient with significant thought disorder, grandiose and Buddhist delusions  TOO Granted 9/26 and will continue with haloperidol trial as per court order  Receiving  IM haloperidol 10 mg/diphenhydramine 50 mg /Lorazepam 2 mg nightly as per court order as still refusing PO Meds  Received Haloperidol Decanoate 75 mg IM 10/3 as per court order  Haloperidol level drawn and pending     Impression  Exacerbation of schizophrenia in the context of noncompliance     PLAN  Patient requires acute inpatient care for treatment of psychosis.   Patient admitted on a 939 emergency status 9/13 and converted to 927 on 9/15 for the purpose of TOO  Administrative meeting for TOO 9/20 and for Court Hearing 9/26 which TOO was granted and medication started  Haloperidol 10 mg /diphenhydramine 50 mg /Lorazepam 2 mg as per court order PO offered, IM if refused  10/3 Haloperidol Decanoate JUAN 75 mg   Patient does not require constant observation at this time and will follow with 15 minute checks.  Treatment will include individual therapy/supportive therapy/ rehab therapy/ psychopharmacological therapy and milieu therapy

## 2023-10-12 NOTE — BH INPATIENT PSYCHIATRY PROGRESS NOTE - NSBHCHARTREVIEWVS_PSY_A_CORE FT
Vital Signs Last 24 Hrs  T(C): 36.3 (10-12-23 @ 07:42), Max: 36.3 (10-12-23 @ 07:42)  T(F): 97.3 (10-12-23 @ 07:42), Max: 97.3 (10-12-23 @ 07:42)  HR: --  BP: --  BP(mean): --  RR: --  SpO2: --    Orthostatic VS  10-12-23 @ 07:42  Lying BP: --/-- HR: --  Sitting BP: 134/60 HR: 78  Standing BP: --/-- HR: --  Site: --  Mode: --  Orthostatic VS  10-11-23 @ 07:48  Lying BP: --/-- HR: --  Sitting BP: 126/56 HR: 86  Standing BP: --/-- HR: --  Site: --  Mode: --

## 2023-10-13 PROCEDURE — 99232 SBSQ HOSP IP/OBS MODERATE 35: CPT

## 2023-10-13 RX ADMIN — Medication 50 MILLIGRAM(S): at 20:32

## 2023-10-13 RX ADMIN — HALOPERIDOL DECANOATE 10 MILLIGRAM(S): 100 INJECTION INTRAMUSCULAR at 20:32

## 2023-10-13 RX ADMIN — Medication 2 MILLIGRAM(S): at 20:33

## 2023-10-13 NOTE — BH INPATIENT PSYCHIATRY PROGRESS NOTE - NSBHASSESSSUMMFT_PSY_ALL_CORE
58F,  PPH of schizophrenia, not in current treatment, previously inpatient [place] resident in 2004, denies prior suicide attempt, denies substance use history, denies violence/legal issues, no known PMH, brought in by EMS; activated by daughter for bizarre behavior and worsening psychotic symptoms    10/13 Update  Patient remains uncooperative with staff, but less irritable, paranoid   Refused PO meds and every night has received IM meds as per court order  Patient with  some improvement in thought disorder though she remains with grandiose and Methodist delusions  TOO Granted 9/26 and will continue with haloperidol trial as per court order  Receiving  IM haloperidol 10 mg/diphenhydramine 50 mg /Lorazepam 2 mg nightly as per court order as still refusing PO Meds  Received Haloperidol Decanoate 75 mg IM 10/3 as per court order  Haloperidol level drawn and pending     Impression  Exacerbation of schizophrenia in the context of noncompliance     PLAN  Patient requires acute inpatient care for treatment of psychosis.   Patient admitted on a 939 emergency status 9/13 and converted to 927 on 9/15 for the purpose of TOO  Administrative meeting for TOO 9/20 and for Court Hearing 9/26 which TOO was granted and medication started  Haloperidol 10 mg /diphenhydramine 50 mg /Lorazepam 2 mg as per court order PO offered, IM if refused  10/3 Haloperidol Decanoate JUAN 75 mg   Patient does not require constant observation at this time and will follow with 15 minute checks.  Treatment will include individual therapy/supportive therapy/ rehab therapy/ psychopharmacological therapy and milieu therapy

## 2023-10-13 NOTE — BH INPATIENT PSYCHIATRY PROGRESS NOTE - NSBHFUPINTERVALHXFT_PSY_A_CORE
Patient is seen and evaluated for follow up of psychosis.    Chart reviewed and case discussed with treatment team.  Patient has continued to be delusional but less irritable  Has continued to refuse the PO meds offered every night despite court order  Continues to received IM haloperidol 10 mg/diphenhydramine 50 mg /Lorazepam 2 mg nightly as per court order  Remains unwilling to interact with writer, staff or peers or talk to writer at all but no longer screaming at me daily

## 2023-10-13 NOTE — BH INPATIENT PSYCHIATRY PROGRESS NOTE - MSE UNSTRUCTURED FT
On exam today the patient remains with slight clinical improvement    She is less irritable though still uncooperative with questions and refuses to engage with writer or any of the staff   Speech is loud, rapid and pressured but no longer screaming when she is approached.    Thought process: with disorder of thought process.    Thought content: with paranoid and Gnosticist delusional beliefs yelling at staff to talk with Les only about her story.   Affect: labile from flat to angry  Patient refuses to answer questions about Suicide, homicide, perceptual disturbances, Mood, or Memory  Patient is Alert and oriented   Insight and judgment are impaired. Impulse control is tenuous at this time

## 2023-10-13 NOTE — BH INPATIENT PSYCHIATRY PROGRESS NOTE - CURRENT MEDICATION
MEDICATIONS  (STANDING):  diphenhydrAMINE 50 milliGRAM(s) Oral at bedtime  haloperidol     Tablet 10 milliGRAM(s) Oral at bedtime  LORazepam     Tablet 2 milliGRAM(s) Oral at bedtime    MEDICATIONS  (PRN):  diphenhydrAMINE 50 milliGRAM(s) Oral every 4 hours PRN agitation/EPS  diphenhydrAMINE Injectable 50 milliGRAM(s) IntraMuscular at bedtime PRN EPS Prohylaxis  diphenhydrAMINE Injectable 50 milliGRAM(s) IntraMuscular at bedtime PRN MOO for psychosis if pt declines Benadryl 50 mg PO  diphenhydrAMINE Injectable 50 milliGRAM(s) IntraMuscular at bedtime PRN EPS Prohylaxis  haloperidol     Tablet 5 milliGRAM(s) Oral every 4 hours PRN agitation  haloperidol    Injectable 10 milliGRAM(s) IntraMuscular at bedtime PRN psychosis MOO if patient declines Haldol 10 mg PO  haloperidol    Injectable 5 milliGRAM(s) IntraMuscular Once PRN agitation  LORazepam   Injectable 2 milliGRAM(s) IntraMuscular at bedtime PRN MOO for psychosis if pt declines Ativan 2 mg PO

## 2023-10-13 NOTE — BH INPATIENT PSYCHIATRY PROGRESS NOTE - PRN MEDS
MEDICATIONS  (PRN):  diphenhydrAMINE 50 milliGRAM(s) Oral every 4 hours PRN agitation/EPS  diphenhydrAMINE Injectable 50 milliGRAM(s) IntraMuscular at bedtime PRN EPS Prohylaxis  diphenhydrAMINE Injectable 50 milliGRAM(s) IntraMuscular at bedtime PRN MOO for psychosis if pt declines Benadryl 50 mg PO  diphenhydrAMINE Injectable 50 milliGRAM(s) IntraMuscular at bedtime PRN EPS Prohylaxis  haloperidol     Tablet 5 milliGRAM(s) Oral every 4 hours PRN agitation  haloperidol    Injectable 10 milliGRAM(s) IntraMuscular at bedtime PRN psychosis MOO if patient declines Haldol 10 mg PO  haloperidol    Injectable 5 milliGRAM(s) IntraMuscular Once PRN agitation  LORazepam   Injectable 2 milliGRAM(s) IntraMuscular at bedtime PRN MOO for psychosis if pt declines Ativan 2 mg PO

## 2023-10-14 RX ORDER — BENZTROPINE MESYLATE 1 MG
2 TABLET ORAL ONCE
Refills: 0 | Status: COMPLETED | OUTPATIENT
Start: 2023-10-14 | End: 2023-10-14

## 2023-10-14 RX ADMIN — Medication 50 MILLIGRAM(S): at 22:12

## 2023-10-14 RX ADMIN — Medication 2 MILLIGRAM(S): at 22:12

## 2023-10-14 RX ADMIN — HALOPERIDOL DECANOATE 10 MILLIGRAM(S): 100 INJECTION INTRAMUSCULAR at 22:12

## 2023-10-14 NOTE — BH CHART NOTE - NSEVENTNOTEFT_PSY_ALL_CORE
Interval History:  MARIO called for new-onset tremors. Per RN, patient  Pt denies chest pain, SOB, or edema. Denies headache, visual changes, confusion, or n/v.       T(C): 36.4 (10-14-23 @ 06:27), Max: 36.4 (10-14-23 @ 06:27)  HR: 84 (10-14-23 @ 06:27) (84 - 84)  BP: 130/77 (10-14-23 @ 06:27) (130/77 - 130/77)  RR: --  SpO2: --    Physical Exam:  Gen: Patient sitting on hospital bed, NAD   HEENT: NC/AT,  EOMI.    Resp: CTA b/l. No wheezes, ronchi, or crackles.   CV: Radial pulses 2+ b/l, RRR, no murmurs.   Abd: soft, NTND, no guarding or rigidity. NABS.    Ext: ROM intact, no clubbing, edema, or cyanosis.   Neuro: awake, alert, grossly oriented.     Assessment:  MARIO called for [incident]. Pt clinically stable with exam otherwise unremarkable.     Plan:  1. no further medical intervention necessary at this time.   2. will continue to monitor routinely.   3. d/w RN staff    Interval History:  MARIO called for new-onset tremors. Per RN, tremors observed in b/l hands which patient attributes to current medications. Patient states tremors have been present "since day 1" and endorses hx of similar adverse effects from various medications (Risperdal, Seroquel, Cogentin etc). Exam significant for tremor-like movements in B/L upper extremities and hands, occurring at rest and decreasing in intensity with activity. Of note, tremors do not appear to be pill-rolling or fine. No evidence of acute dystonia, rigidity, or bradykinesia noted on exam. Patient declining Cogentin for treatment of possible EPS, despite multiple attempts to offer this medication.    T(C): 36.4 (10-14-23 @ 06:27), Max: 36.4 (10-14-23 @ 06:27)  HR: 84 (10-14-23 @ 06:27) (84 - 84)  BP: 130/77 (10-14-23 @ 06:27) (130/77 - 130/77)  RR: --  SpO2: --    Physical Exam:  Gen: Patient sitting on hospital bed, NAD   HEENT: NC/AT,  EOMI.    Resp: CTA b/l. No wheezes, ronchi, or crackles.   CV: Radial pulses 2+ b/l, RRR, no murmurs.   Abd: soft, NTND, no guarding or rigidity. NABS.    Ext: ROM intact, no clubbing, edema, or cyanosis.   Neuro: awake, alert, grossly oriented, +tremor-like movements in B/L upper extremities and hands, occurring at rest and decreasing in intensity with activity    Assessment:  MARIO called for new onset tremors. Patient does not appear to have symptoms suggestive of drug-induced Parkinsonism, although adverse effect cannot be fully ruled out given new onset tremors after initiation of IM antipsychotic medication.     Plan:  1. Cogentin 2mg x 1 IM for EPS sxs  2. will continue to monitor routinely.   3. d/w RN staff

## 2023-10-15 PROCEDURE — 99232 SBSQ HOSP IP/OBS MODERATE 35: CPT

## 2023-10-15 RX ORDER — BENZTROPINE MESYLATE 1 MG
2 TABLET ORAL ONCE
Refills: 0 | Status: COMPLETED | OUTPATIENT
Start: 2023-10-15 | End: 2023-10-15

## 2023-10-15 RX ADMIN — Medication 2 MILLIGRAM(S): at 20:29

## 2023-10-15 RX ADMIN — HALOPERIDOL DECANOATE 10 MILLIGRAM(S): 100 INJECTION INTRAMUSCULAR at 20:28

## 2023-10-15 RX ADMIN — Medication 50 MILLIGRAM(S): at 20:28

## 2023-10-15 NOTE — BH INPATIENT PSYCHIATRY PROGRESS NOTE - NSBHCHARTREVIEWVS_PSY_A_CORE FT
Vital Signs Last 24 Hrs  T(C): 36.6 (10-15-23 @ 07:47), Max: 36.6 (10-15-23 @ 07:47)  T(F): 97.8 (10-15-23 @ 07:47), Max: 97.8 (10-15-23 @ 07:47)  HR: --  BP: --  BP(mean): --  RR: --  SpO2: --    Orthostatic VS  10-15-23 @ 07:47  Lying BP: --/-- HR: --  Sitting BP: 119/80 HR: 90  Standing BP: --/-- HR: --  Site: --  Mode: --

## 2023-10-15 NOTE — BH INPATIENT PSYCHIATRY PROGRESS NOTE - NSBHMETABOLIC_PSY_ALL_CORE_FT
BMI:   HbA1c:   Glucose:   BP: 130/77 (10-14-23 @ 06:27) (130/77 - 130/77)  Lipid Panel: Date/Time: 09-14-23 @ 09:30  Cholesterol, Serum: 229  Direct LDL: --  HDL Cholesterol, Serum: 62  Total Cholesterol/HDL Ration Measurement: --  Triglycerides, Serum: 96

## 2023-10-15 NOTE — BH INPATIENT PSYCHIATRY PROGRESS NOTE - MSE UNSTRUCTURED FT
On exam today the patient remains with slight clinical improvement    She is less irritable though still uncooperative with questions but allowed writer to examine her today    Speech is loud, rapid and pressured but no longer screaming when she is approached.    Thought process: with disorder of thought process.    Thought content: with paranoid and Voodoo delusional beliefs    Affect: labile from flat to irritable  Patient refuses to answer questions about Suicide, homicide, perceptual disturbances, Mood, or Memory  Patient is Alert and oriented   Insight and judgment are impaired. Impulse control is tenuous at this time

## 2023-10-15 NOTE — BH INPATIENT PSYCHIATRY PROGRESS NOTE - NSBHFUPINTERVALHXFT_PSY_A_CORE
Patient is seen and evaluated for follow up of psychosis.    Chart reviewed and case discussed with treatment team.  Patient was evaluated for tremor last night  Was observed with mild tremor and offered benztropine PO which she refused and she requested IM benztropine  Allowed evaluation today and with very mild tremor at rest  Again offered the addition of benztropine PO standing which she refused  Stated "when I need it give me the shot"  Has continued to be delusional but less irritable  Was dressed and watching TV in the day room today  Has continued to refuse the PO meds offered every night despite court order  Continues to received IM haloperidol 10 mg/diphenhydramine 50 mg /Lorazepam 2 mg nightly as per court order

## 2023-10-15 NOTE — BH INPATIENT PSYCHIATRY PROGRESS NOTE - NSBHASSESSSUMMFT_PSY_ALL_CORE
58F,  PPH of schizophrenia, not in current treatment, previously inpatient [place] resident in 2004, denies prior suicide attempt, denies substance use history, denies violence/legal issues, no known PMH, brought in by EMS; activated by daughter for bizarre behavior and worsening psychotic symptoms    10/15 Update  Patient with some improvement in that she was dressed and watching TV in the day room  Allowed MD to examine her for tremor   Overall minimally  cooperative with staff and less irritable, paranoid   Refused PO meds and every night has received IM meds as per court order  Patient with  some improvement in thought disorder though she remains with grandiose and Gnosticist delusions  TOO Granted 9/26 and will continue with haloperidol trial as per court order  Receiving  IM haloperidol 10 mg/diphenhydramine 50 mg /Lorazepam 2 mg nightly as per court order as still refusing PO Meds  Received Haloperidol Decanoate 75 mg IM 10/3 as per court order  Haloperidol level drawn and pending     Impression  Exacerbation of schizophrenia in the context of noncompliance     PLAN  Patient requires acute inpatient care for treatment of psychosis.   Patient admitted on a 939 emergency status 9/13 and converted to 927 on 9/15 for the purpose of TOO  Administrative meeting for TOO 9/20 and for Court Hearing 9/26 which TOO was granted and medication started  Haloperidol 10 mg /diphenhydramine 50 mg /Lorazepam 2 mg as per court order PO offered, IM if refused  10/3 Haloperidol Decanoate JUAN 75 mg   Patient does not require constant observation at this time and will follow with 15 minute checks.  Treatment will include individual therapy/supportive therapy/ rehab therapy/ psychopharmacological therapy and milieu therapy

## 2023-10-16 PROCEDURE — 99232 SBSQ HOSP IP/OBS MODERATE 35: CPT

## 2023-10-16 RX ORDER — BENZTROPINE MESYLATE 1 MG
2 TABLET ORAL AT BEDTIME
Refills: 0 | Status: DISCONTINUED | OUTPATIENT
Start: 2023-10-16 | End: 2023-10-19

## 2023-10-16 RX ORDER — BENZTROPINE MESYLATE 1 MG
2 TABLET ORAL ONCE
Refills: 0 | Status: COMPLETED | OUTPATIENT
Start: 2023-10-16 | End: 2023-10-16

## 2023-10-16 RX ADMIN — HALOPERIDOL DECANOATE 10 MILLIGRAM(S): 100 INJECTION INTRAMUSCULAR at 20:35

## 2023-10-16 RX ADMIN — Medication 2 MILLIGRAM(S): at 20:35

## 2023-10-16 NOTE — BH INPATIENT PSYCHIATRY PROGRESS NOTE - NSBHFUPINTERVALHXFT_PSY_A_CORE
Patient is seen and evaluated for follow up of psychosis.    Chart reviewed and case discussed with treatment team.  Patient willing to talk about her tremor  As noted with mild tremor and offered benztropine PO which she refused and she requested IM benztropine  Allowed evaluation again and with very mild tremor at rest  Again offered the addition of benztropine PO standing which she refused  Has continued to be delusional but less irritable  Was dressed and watching TV in the day room over the weekend  Has continued to refuse the PO meds offered every night despite court order  Continues to received IM haloperidol 10 mg/diphenhydramine 50 mg /Lorazepam 2 mg nightly as per court order       Patient is seen and evaluated for follow up of psychosis.    Chart reviewed and case discussed with treatment team.  Patient willing to talk about her tremor and her Faith delusions  As noted with mild tremor and offered benztropine PO which she refused and she requested IM benztropine  Allowed evaluation again and with very mild tremor at rest  Again offered the addition of benztropine PO standing which she refused  Has continued to be delusional but less irritable  Was dressed and watching TV in the day room again today   Has continued to refuse the PO meds offered every night despite court order  Continues to received IM haloperidol 10 mg/ benztropine 2 mg /Lorazepam 2 mg nightly as per court order

## 2023-10-16 NOTE — BH INPATIENT PSYCHIATRY PROGRESS NOTE - MSE UNSTRUCTURED FT
On exam today the patient remains with slight clinical improvement    She is less irritable though still uncooperative with questions but allowed writer to examine her today    Speech is loud, rapid and pressured but no longer screaming when she is approached.    Thought process: with disorder of thought process.    Thought content: with paranoid and Denominational delusional beliefs    Affect: labile from flat to irritable  Patient refuses to answer questions about Suicide, homicide, perceptual disturbances, Mood, or Memory  Patient is Alert and oriented   Insight and judgment are impaired. Impulse control is tenuous at this time   On exam today the patient remains with slight clinical improvement    She is less irritable though still uncooperative with questions but allowed writer to examine her today    Speech is loud, rapid and pressured but no longer screaming when she is approached.    Thought process: with disorder of thought process.    Thought content: with paranoid and Congregational delusional beliefs that she is Les and  to [place]   Affect: labile from flat to irritable  Patient refuses to answer questions about Suicide, homicide, perceptual disturbances, Mood, or Memory  Patient is Alert and oriented   Insight and judgment are impaired. Impulse control is tenuous at this time

## 2023-10-16 NOTE — BH INPATIENT PSYCHIATRY PROGRESS NOTE - NSBHASSESSSUMMFT_PSY_ALL_CORE
58F,  PPH of schizophrenia, not in current treatment, previously inpatient [place] resident in 2004, denies prior suicide attempt, denies substance use history, denies violence/legal issues, no known PMH, brought in by EMS; activated by daughter for bizarre behavior and worsening psychotic symptoms    10/16 Update  Patient with some improvement in that she was dressed and watching TV in the day room  Allowed MD to examine her for tremor and willing to talk to MD about tremor  Overall minimally  cooperative with staff and less irritable, paranoid   Refused PO meds and every night has received IM meds as per court order  Patient with  some improvement in thought disorder though she remains with grandiose and Presybeterian delusions  TOO Granted 9/26 and will continue with haloperidol trial as per court order  Receiving  IM haloperidol 10 mg/diphenhydramine 50 mg /Lorazepam 2 mg nightly as per court order as still refusing PO Meds  Received Haloperidol Decanoate 75 mg IM 10/3 as per court order  Haloperidol level drawn and still pending     Impression  Exacerbation of schizophrenia in the context of noncompliance     PLAN  Patient requires acute inpatient care for treatment of psychosis.   Patient admitted on a 939 emergency status 9/13 and converted to 927 on 9/15 for the purpose of TOO  Administrative meeting for TOO 9/20 and for Court Hearing 9/26 which TOO was granted and medication started  Haloperidol 10 mg /diphenhydramine 50 mg /Lorazepam 2 mg as per court order PO offered, IM if refused  10/3 Haloperidol Decanoate JUAN 75 mg   Patient does not require constant observation at this time and will follow with 15 minute checks.  Treatment will include individual therapy/supportive therapy/ rehab therapy/ psychopharmacological therapy and milieu therapy   58F,  PPH of schizophrenia, not in current treatment, previously inpatient [place] resident in 2004, denies prior suicide attempt, denies substance use history, denies violence/legal issues, no known PMH, brought in by EMS; activated by daughter for bizarre behavior and worsening psychotic symptoms    10/16 Update  Patient with some improvement in that she was dressed and watching TV in the day room  Allowed MD to examine her for tremor and willing to talk to MD about tremor  Overall minimally  cooperative with staff and less irritable, paranoid   Refused PO meds and every night has received IM meds as per court order  Patient with  some improvement in thought disorder though she remains with grandiose and Lutheran delusions  TOO Granted 9/26 and will continue with haloperidol trial as per court order  with presence of tremor will change diphenhydramine to benztropine for better EPS coverage   Receiving  IM haloperidol 10 mg/benztropine 2 mg /Lorazepam 2 mg nightly as per court order as still refusing PO Meds  Received Haloperidol Decanoate 75 mg IM 10/3 as per court order  Haloperidol level drawn and still pending     Impression  Exacerbation of schizophrenia in the context of noncompliance     PLAN  Patient requires acute inpatient care for treatment of psychosis.   Patient admitted on a 939 emergency status 9/13 and converted to 927 on 9/15 for the purpose of TOO  Administrative meeting for TOO 9/20 and for Court Hearing 9/26 which TOO was granted and medication started  Haloperidol 10 mg /diphenhydramine 50 mg /Lorazepam 2 mg as per court order PO offered, IM if refused  10/3 Haloperidol Decanoate JUAN 75 mg   Patient does not require constant observation at this time and will follow with 15 minute checks.  Treatment will include individual therapy/supportive therapy/ rehab therapy/ psychopharmacological therapy and milieu therapy

## 2023-10-16 NOTE — BH INPATIENT PSYCHIATRY PROGRESS NOTE - NSBHMETABOLIC_PSY_ALL_CORE_FT
BMI:   HbA1c:   Glucose:   BP: 137/89 (10-16-23 @ 06:17) (130/77 - 137/89)  Lipid Panel: Date/Time: 09-14-23 @ 09:30  Cholesterol, Serum: 229  Direct LDL: --  HDL Cholesterol, Serum: 62  Total Cholesterol/HDL Ration Measurement: --  Triglycerides, Serum: 96

## 2023-10-16 NOTE — BH INPATIENT PSYCHIATRY PROGRESS NOTE - NSBHCHARTREVIEWVS_PSY_A_CORE FT
Vital Signs Last 24 Hrs  T(C): 36.2 (10-16-23 @ 06:17), Max: 36.6 (10-15-23 @ 07:47)  T(F): 97.2 (10-16-23 @ 06:17), Max: 97.8 (10-15-23 @ 07:47)  HR: 86 (10-16-23 @ 06:17) (86 - 86)  BP: 137/89 (10-16-23 @ 06:17) (137/89 - 137/89)  BP(mean): --  RR: --  SpO2: --    Orthostatic VS  10-15-23 @ 07:47  Lying BP: --/-- HR: --  Sitting BP: 119/80 HR: 90  Standing BP: --/-- HR: --  Site: --  Mode: --   Vital Signs Last 24 Hrs  T(C): 36.2 (10-16-23 @ 06:17), Max: 36.2 (10-16-23 @ 06:17)  T(F): 97.2 (10-16-23 @ 06:17), Max: 97.2 (10-16-23 @ 06:17)  HR: 86 (10-16-23 @ 06:17) (86 - 86)  BP: 137/89 (10-16-23 @ 06:17) (137/89 - 137/89)  BP(mean): --  RR: --  SpO2: --    Orthostatic VS  10-15-23 @ 07:47  Lying BP: --/-- HR: --  Sitting BP: 119/80 HR: 90  Standing BP: --/-- HR: --  Site: --  Mode: --

## 2023-10-17 PROCEDURE — 99232 SBSQ HOSP IP/OBS MODERATE 35: CPT

## 2023-10-17 RX ORDER — BENZTROPINE MESYLATE 1 MG
2 TABLET ORAL AT BEDTIME
Refills: 0 | Status: DISCONTINUED | OUTPATIENT
Start: 2023-10-17 | End: 2023-10-19

## 2023-10-17 RX ADMIN — Medication 2 MILLIGRAM(S): at 21:13

## 2023-10-17 RX ADMIN — HALOPERIDOL DECANOATE 10 MILLIGRAM(S): 100 INJECTION INTRAMUSCULAR at 21:13

## 2023-10-17 NOTE — BH INPATIENT PSYCHIATRY PROGRESS NOTE - NSBHMETABOLIC_PSY_ALL_CORE_FT
BMI:   HbA1c:   Glucose:   BP: 137/89 (10-16-23 @ 06:17) (137/89 - 137/89)  Lipid Panel: Date/Time: 09-14-23 @ 09:30  Cholesterol, Serum: 229  Direct LDL: --  HDL Cholesterol, Serum: 62  Total Cholesterol/HDL Ration Measurement: --  Triglycerides, Serum: 96

## 2023-10-17 NOTE — BH INPATIENT PSYCHIATRY PROGRESS NOTE - CURRENT MEDICATION
MEDICATIONS  (STANDING):  benztropine 2 milliGRAM(s) Oral at bedtime  haloperidol     Tablet 10 milliGRAM(s) Oral at bedtime  LORazepam     Tablet 2 milliGRAM(s) Oral at bedtime    MEDICATIONS  (PRN):  diphenhydrAMINE 50 milliGRAM(s) Oral every 4 hours PRN agitation/EPS  diphenhydrAMINE Injectable 50 milliGRAM(s) IntraMuscular at bedtime PRN EPS Prohylaxis  haloperidol     Tablet 5 milliGRAM(s) Oral every 4 hours PRN agitation  haloperidol    Injectable 5 milliGRAM(s) IntraMuscular Once PRN agitation  haloperidol    Injectable 10 milliGRAM(s) IntraMuscular at bedtime PRN psychosis MOO if patient declines Haldol 10 mg PO  LORazepam   Injectable 2 milliGRAM(s) IntraMuscular at bedtime PRN MOO for psychosis if pt declines Ativan 2 mg PO

## 2023-10-17 NOTE — BH INPATIENT PSYCHIATRY PROGRESS NOTE - MSE UNSTRUCTURED FT
On exam today the patient remains with slight clinical improvement    She is less irritable though more cooperative with questions but will only discuss Shinto delusional material     Speech is loud, rapid and pressured but no longer screaming when she is approached.    Thought process: with disorder of thought process.    Thought content: with paranoid and Shinto delusional beliefs that she is Les and  to Graham   Affect: labile from flat to irritable  Patient refuses to answer questions about Suicide, homicide, perceptual disturbances, Mood, or Memory  Patient is Alert and oriented   Insight and judgment are impaired. Impulse control is tenuous at this time

## 2023-10-17 NOTE — BH INPATIENT PSYCHIATRY PROGRESS NOTE - NSBHFUPINTERVALHXFT_PSY_A_CORE
Patient is seen and evaluated for follow up of psychosis.    Chart reviewed and case discussed with treatment team.  Patient willing to engage with writer and talk about her tremor and her Roman Catholic delusions  As noted with mild tremor and offered benztropine PO which she refused and she requested IM benztropine  Allowed evaluation again and with very mild tremor at rest  Again offered the addition of benztropine PO standing which she refused  Has continued to be delusional but less irritable  Has continued to refuse the PO meds offered every night despite court order  Continues to received IM haloperidol 10 mg/ benztropine 2 mg /Lorazepam 2 mg nightly as per court order

## 2023-10-17 NOTE — BH INPATIENT PSYCHIATRY PROGRESS NOTE - PRN MEDS
MEDICATIONS  (PRN):  diphenhydrAMINE 50 milliGRAM(s) Oral every 4 hours PRN agitation/EPS  diphenhydrAMINE Injectable 50 milliGRAM(s) IntraMuscular at bedtime PRN EPS Prohylaxis  haloperidol     Tablet 5 milliGRAM(s) Oral every 4 hours PRN agitation  haloperidol    Injectable 5 milliGRAM(s) IntraMuscular Once PRN agitation  haloperidol    Injectable 10 milliGRAM(s) IntraMuscular at bedtime PRN psychosis MOO if patient declines Haldol 10 mg PO  LORazepam   Injectable 2 milliGRAM(s) IntraMuscular at bedtime PRN MOO for psychosis if pt declines Ativan 2 mg PO

## 2023-10-17 NOTE — BH INPATIENT PSYCHIATRY PROGRESS NOTE - NSBHCHARTREVIEWVS_PSY_A_CORE FT
Vital Signs Last 24 Hrs  T(C): 37.2 (10-17-23 @ 06:28), Max: 37.2 (10-17-23 @ 06:28)  T(F): 98.9 (10-17-23 @ 06:28), Max: 98.9 (10-17-23 @ 06:28)  HR: --  BP: --  BP(mean): --  RR: --  SpO2: --    Orthostatic VS  10-17-23 @ 06:28  Lying BP: --/-- HR: --  Sitting BP: 136/87 HR: 86  Standing BP: 129/73 HR: 95  Site: --  Mode: --  Orthostatic VS  10-15-23 @ 07:47  Lying BP: --/-- HR: --  Sitting BP: 119/80 HR: 90  Standing BP: --/-- HR: --  Site: --  Mode: --

## 2023-10-17 NOTE — BH INPATIENT PSYCHIATRY PROGRESS NOTE - NSBHASSESSSUMMFT_PSY_ALL_CORE
58F,  PPH of schizophrenia, not in current treatment, previously inpatient [place] resident in 2004, denies prior suicide attempt, denies substance use history, denies violence/legal issues, no known PMH, brought in by EMS; activated by daughter for bizarre behavior and worsening psychotic symptoms    10/17 Update  Patient with some improvement in that she is willing to talk to MD about tremor and delusional material  Overall minimally  cooperative with staff and less irritable, paranoid   Refused PO meds and every night has received IM meds as per court order states "I prefer shots"  Patient with  some improvement in thought disorder though she remains with grandiose and Bahai delusions  TOO Granted 9/26 and will continue with haloperidol trial as per court order  with presence of tremor will change diphenhydramine to benztropine for better EPS coverage   Receiving  IM haloperidol 10 mg/benztropine 2 mg /Lorazepam 2 mg nightly as per court order as still refusing PO Meds  Received Haloperidol Decanoate 75 mg IM 10/3 as per court order  Haloperidol level drawn and still pending     Impression  Exacerbation of schizophrenia in the context of noncompliance     PLAN  Patient requires acute inpatient care for treatment of psychosis.   Patient admitted on a 939 emergency status 9/13 and converted to 927 on 9/15 for the purpose of TOO  Administrative meeting for TOO 9/20 and for Court Hearing 9/26 which TOO was granted and medication started  Haloperidol 10 mg /diphenhydramine 50 mg /Lorazepam 2 mg as per court order PO offered, IM if refused  10/3 Haloperidol Decanoate JUAN 75 mg   Patient does not require constant observation at this time and will follow with 15 minute checks.  Treatment will include individual therapy/supportive therapy/ rehab therapy/ psychopharmacological therapy and milieu therapy

## 2023-10-18 PROCEDURE — 99232 SBSQ HOSP IP/OBS MODERATE 35: CPT

## 2023-10-18 RX ADMIN — Medication 2 MILLIGRAM(S): at 21:07

## 2023-10-18 RX ADMIN — HALOPERIDOL DECANOATE 10 MILLIGRAM(S): 100 INJECTION INTRAMUSCULAR at 21:07

## 2023-10-18 NOTE — BH INPATIENT PSYCHIATRY PROGRESS NOTE - PRN MEDS
MEDICATIONS  (PRN):  benztropine Injectable 2 milliGRAM(s) IntraMuscular at bedtime PRN MOO for EPS if pt declines Cogentin 2mg PO  diphenhydrAMINE 50 milliGRAM(s) Oral every 4 hours PRN agitation/EPS  diphenhydrAMINE Injectable 50 milliGRAM(s) IntraMuscular at bedtime PRN EPS Prohylaxis  haloperidol     Tablet 5 milliGRAM(s) Oral every 4 hours PRN agitation  haloperidol    Injectable 5 milliGRAM(s) IntraMuscular Once PRN agitation  haloperidol    Injectable 10 milliGRAM(s) IntraMuscular at bedtime PRN psychosis MOO if patient declines Haldol 10 mg PO  LORazepam   Injectable 2 milliGRAM(s) IntraMuscular at bedtime PRN MOO for psychosis if pt declines Ativan 2 mg PO

## 2023-10-18 NOTE — BH INPATIENT PSYCHIATRY PROGRESS NOTE - NSBHASSESSSUMMFT_PSY_ALL_CORE
58F,  PPH of schizophrenia, not in current treatment, previously inpatient [place] resident in 2004, denies prior suicide attempt, denies substance use history, denies violence/legal issues, no known PMH, brought in by EMS; activated by daughter for bizarre behavior and worsening psychotic symptoms    10/18 Update  Patient with some improvement in that she is willing to talk to MD about tremor and delusional material  Overall minimally  cooperative with staff and has been dressed and out in the day room watching TV    Refused PO meds and every night has received IM meds as per court order states "I prefer shots"  Patient with  some improvement in thought disorder though she remains with grandiose and Confucianism delusions  TOO Granted 9/26 and will continue with haloperidol trial as per court order  with presence of tremor will change diphenhydramine to benztropine for better EPS coverage   Receiving  IM haloperidol 10 mg/benztropine 2 mg /Lorazepam 2 mg nightly as per court order as still refusing PO Meds  Received Haloperidol Decanoate 75 mg IM 10/3 as per court order  Haloperidol level drawn and still pending     Impression  Exacerbation of schizophrenia in the context of noncompliance     PLAN  Patient requires acute inpatient care for treatment of psychosis.   Patient admitted on a 939 emergency status 9/13 and converted to 927 on 9/15 for the purpose of TOO  Administrative meeting for TOO 9/20 and for Court Hearing 9/26 which TOO was granted and medication started  Haloperidol 10 mg /diphenhydramine 50 mg /Lorazepam 2 mg as per court order PO offered, IM if refused  10/3 Haloperidol Decanoate JUAN 75 mg   Patient does not require constant observation at this time and will follow with 15 minute checks.  Treatment will include individual therapy/supportive therapy/ rehab therapy/ psychopharmacological therapy and milieu therapy

## 2023-10-18 NOTE — BH INPATIENT PSYCHIATRY PROGRESS NOTE - NSBHCHARTREVIEWVS_PSY_A_CORE FT
Vital Signs Last 24 Hrs  T(C): 36.8 (10-18-23 @ 07:36), Max: 36.8 (10-18-23 @ 07:36)  T(F): 98.2 (10-18-23 @ 07:36), Max: 98.2 (10-18-23 @ 07:36)  HR: 98 (10-18-23 @ 07:36) (98 - 98)  BP: 129/75 (10-18-23 @ 07:36) (129/75 - 129/75)  BP(mean): --  RR: --  SpO2: --    Orthostatic VS  10-17-23 @ 06:28  Lying BP: --/-- HR: --  Sitting BP: 136/87 HR: 86  Standing BP: 129/73 HR: 95  Site: --  Mode: --

## 2023-10-18 NOTE — BH INPATIENT PSYCHIATRY PROGRESS NOTE - CURRENT MEDICATION
MEDICATIONS  (STANDING):  benztropine 2 milliGRAM(s) Oral at bedtime  haloperidol     Tablet 10 milliGRAM(s) Oral at bedtime  LORazepam     Tablet 2 milliGRAM(s) Oral at bedtime    MEDICATIONS  (PRN):  benztropine Injectable 2 milliGRAM(s) IntraMuscular at bedtime PRN MOO for EPS if pt declines Cogentin 2mg PO  diphenhydrAMINE 50 milliGRAM(s) Oral every 4 hours PRN agitation/EPS  diphenhydrAMINE Injectable 50 milliGRAM(s) IntraMuscular at bedtime PRN EPS Prohylaxis  haloperidol     Tablet 5 milliGRAM(s) Oral every 4 hours PRN agitation  haloperidol    Injectable 5 milliGRAM(s) IntraMuscular Once PRN agitation  haloperidol    Injectable 10 milliGRAM(s) IntraMuscular at bedtime PRN psychosis MOO if patient declines Haldol 10 mg PO  LORazepam   Injectable 2 milliGRAM(s) IntraMuscular at bedtime PRN MOO for psychosis if pt declines Ativan 2 mg PO

## 2023-10-18 NOTE — BH INPATIENT PSYCHIATRY PROGRESS NOTE - MSE UNSTRUCTURED FT
On exam today the patient remains with slight clinical improvement    She is less irritable though more cooperative with questions but will only discuss Yarsanism delusional material     Speech is loud, rapid and pressured but no longer screaming when she is approached.    Thought process: with disorder of thought process.    Thought content: with paranoid and Yarsanism delusional beliefs that she is Les and  to Graham   Affect: labile from flat to irritable  Patient refuses to answer questions about suicide, homicide, perceptual disturbances, Mood, or Memory  Patient is Alert and oriented   Insight and judgment are impaired. Impulse control is tenuous at this time

## 2023-10-18 NOTE — BH INPATIENT PSYCHIATRY PROGRESS NOTE - NSBHFUPINTERVALHXFT_PSY_A_CORE
Patient is seen and evaluated for follow up of psychosis.    Chart reviewed and case discussed with treatment team.  Patient has been more willing to engage with writer and talk about her tremor and her Quaker delusions  Has continued to refuse the PO meds offered every night despite court order  Continues to received IM haloperidol 10 mg/ benztropine 2 mg /Lorazepam 2 mg nightly as per court order

## 2023-10-18 NOTE — BH INPATIENT PSYCHIATRY PROGRESS NOTE - NSBHMETABOLIC_PSY_ALL_CORE_FT
BMI:   HbA1c:   Glucose:   BP: 129/75 (10-18-23 @ 07:36) (129/75 - 137/89)  Lipid Panel: Date/Time: 09-14-23 @ 09:30  Cholesterol, Serum: 229  Direct LDL: --  HDL Cholesterol, Serum: 62  Total Cholesterol/HDL Ration Measurement: --  Triglycerides, Serum: 96

## 2023-10-19 PROCEDURE — 99233 SBSQ HOSP IP/OBS HIGH 50: CPT

## 2023-10-19 RX ORDER — HALOPERIDOL DECANOATE 100 MG/ML
100 INJECTION INTRAMUSCULAR ONCE
Refills: 0 | Status: COMPLETED | OUTPATIENT
Start: 2023-10-19 | End: 2023-10-19

## 2023-10-19 RX ADMIN — HALOPERIDOL DECANOATE 100 MILLIGRAM(S): 100 INJECTION INTRAMUSCULAR at 14:58

## 2023-10-19 NOTE — BH INPATIENT PSYCHIATRY PROGRESS NOTE - NSBHCHARTREVIEWVS_PSY_A_CORE FT
Vital Signs Last 24 Hrs  T(C): --  T(F): --  HR: 91 (10-19-23 @ 05:45) (91 - 91)  BP: 136/84 (10-19-23 @ 05:45) (136/84 - 136/84)  BP(mean): --  RR: --  SpO2: --

## 2023-10-19 NOTE — BH INPATIENT PSYCHIATRY PROGRESS NOTE - MSE UNSTRUCTURED FT
On exam today the patient remains with slight clinical improvement    She is less irritable though more cooperative with questions but will only discuss Congregational delusional material     Speech is loud, rapid and pressured but no longer screaming when she is approached.    Thought process: with disorder of thought process.    Thought content: with paranoid and Congregational delusional beliefs that she is Les and  to [place]   Affect: labile from flat to irritable  Patient refuses to answer questions about suicide, homicide, perceptual disturbances, Mood, or Memory  Patient is Alert and oriented   Insight and judgment are impaired. Impulse control is tenuous at this time

## 2023-10-19 NOTE — BH INPATIENT PSYCHIATRY PROGRESS NOTE - PRN MEDS
MEDICATIONS  (PRN):  diphenhydrAMINE 50 milliGRAM(s) Oral every 4 hours PRN agitation/EPS  diphenhydrAMINE Injectable 50 milliGRAM(s) IntraMuscular at bedtime PRN EPS Prohylaxis  haloperidol     Tablet 5 milliGRAM(s) Oral every 4 hours PRN agitation  haloperidol    Injectable 5 milliGRAM(s) IntraMuscular Once PRN agitation

## 2023-10-19 NOTE — BH INPATIENT PSYCHIATRY PROGRESS NOTE - NSBHFUPINTERVALHXFT_PSY_A_CORE
Patient is seen and evaluated for follow up of psychosis.    Chart reviewed and case discussed with treatment team.  Patient has been more willing to engage with writer and talk about her tremor and her Yazidism delusions  Has continued to refuse the PO meds offered every night despite court order  Has been getting IM haloperidol 10 mg/ benztropine 2 mg /Lorazepam 2 mg nightly as per court order  Discussed plan to crossover to Haloperidol Decanoate as per court order and with Staff concerned about the # of injections in her deltoid

## 2023-10-19 NOTE — BH INPATIENT PSYCHIATRY PROGRESS NOTE - NSBHMETABOLIC_PSY_ALL_CORE_FT
BMI:   HbA1c:   Glucose:   BP: 136/84 (10-19-23 @ 05:45) (129/75 - 136/84)  Lipid Panel: Date/Time: 09-14-23 @ 09:30  Cholesterol, Serum: 229  Direct LDL: --  HDL Cholesterol, Serum: 62  Total Cholesterol/HDL Ration Measurement: --  Triglycerides, Serum: 96

## 2023-10-19 NOTE — BH INPATIENT PSYCHIATRY PROGRESS NOTE - CURRENT MEDICATION
MEDICATIONS  (STANDING):  haloperidol decanoate Injectable, Long Acting 100 milliGRAM(s) IntraMuscular once    MEDICATIONS  (PRN):  diphenhydrAMINE 50 milliGRAM(s) Oral every 4 hours PRN agitation/EPS  diphenhydrAMINE Injectable 50 milliGRAM(s) IntraMuscular at bedtime PRN EPS Prohylaxis  haloperidol     Tablet 5 milliGRAM(s) Oral every 4 hours PRN agitation  haloperidol    Injectable 5 milliGRAM(s) IntraMuscular Once PRN agitation

## 2023-10-19 NOTE — BH INPATIENT PSYCHIATRY PROGRESS NOTE - NSBHASSESSSUMMFT_PSY_ALL_CORE
58F,  PPH of schizophrenia, not in current treatment, previously inpatient [place] resident in 2004, denies prior suicide attempt, denies substance use history, denies violence/legal issues, no known PMH, brought in by EMS; activated by daughter for bizarre behavior and worsening psychotic symptoms    10/19 Update  Patient with some improvement in that she is willing to talk to MD about tremor and delusional material  Overall minimally  cooperative with staff and has been dressed and out in the day room watching TV    Refused PO meds and every night has received IM meds as per court order states "I prefer shots"  Patient with  some improvement in thought disorder though she remains with grandiose and Uatsdin delusions  TOO Granted 9/26 and will continue with haloperidol trial as per court order  with presence of tremor will change diphenhydramine to benztropine for better EPS coverage   Receiving  IM haloperidol 10 mg/benztropine 2 mg /Lorazepam 2 mg nightly as per court order as still refusing PO Meds  Received Haloperidol Decanoate 75 mg IM 10/3 as per court order and Haloperidol  mg 10/19  Haloperidol level drawn and still pending     Impression  Exacerbation of schizophrenia in the context of noncompliance     PLAN  Patient requires acute inpatient care for treatment of psychosis.   Patient admitted on a 939 emergency status 9/13 and converted to 927 on 9/15 for the purpose of TOO  Administrative meeting for TOO 9/20 and for Court Hearing 9/26 which TOO was granted and medication started  Haloperidol 10 mg /diphenhydramine 50 mg /Lorazepam 2 mg as per court order PO offered, IM if refused  10/3 Haloperidol Decanoate JUAN 75 mg   Patient does not require constant observation at this time and will follow with 15 minute checks.  Treatment will include individual therapy/supportive therapy/ rehab therapy/ psychopharmacological therapy and milieu therapy

## 2023-10-20 PROCEDURE — 99232 SBSQ HOSP IP/OBS MODERATE 35: CPT

## 2023-10-20 NOTE — BH INPATIENT PSYCHIATRY PROGRESS NOTE - MSE UNSTRUCTURED FT
On exam today the patient remains with slight clinical improvement    She is less irritable though more cooperative with questions but will only discuss Amish delusional material     Speech is loud, rapid and pressured but no longer screaming when she is approached.    Thought process: with some improvement in her disorder of thought process.    Thought content: with paranoid and Amish delusional beliefs that she is Les and  to [place]   Affect: labile from flat to irritable  Patient refuses to answer questions about suicide, homicide, perceptual disturbances, Mood, or Memory  Patient is Alert and oriented   Insight and judgment are impaired. Impulse control is tenuous at this time

## 2023-10-20 NOTE — BH INPATIENT PSYCHIATRY PROGRESS NOTE - NSBHFUPINTERVALHXFT_PSY_A_CORE
Patient is seen and evaluated for follow up of psychosis.    Chart reviewed and case discussed with treatment team.  Patient has been more willing to engage with writer and talk about her tremor and her Taoist delusions  Received Haloperidol Decanoate 100 mg IM yesterday

## 2023-10-20 NOTE — BH INPATIENT PSYCHIATRY PROGRESS NOTE - CURRENT MEDICATION
MEDICATIONS  (STANDING):    MEDICATIONS  (PRN):  diphenhydrAMINE 50 milliGRAM(s) Oral every 4 hours PRN agitation/EPS  diphenhydrAMINE Injectable 50 milliGRAM(s) IntraMuscular at bedtime PRN EPS Prohylaxis  haloperidol     Tablet 5 milliGRAM(s) Oral every 4 hours PRN agitation  haloperidol    Injectable 5 milliGRAM(s) IntraMuscular Once PRN agitation

## 2023-10-20 NOTE — BH INPATIENT PSYCHIATRY PROGRESS NOTE - NSBHCHARTREVIEWVS_PSY_A_CORE FT
Vital Signs Last 24 Hrs  T(C): 35.7 (10-20-23 @ 06:05), Max: 35.7 (10-20-23 @ 06:05)  T(F): 96.3 (10-20-23 @ 06:05), Max: 96.3 (10-20-23 @ 06:05)  HR: 85 (10-20-23 @ 06:05) (85 - 85)  BP: 120/78 (10-20-23 @ 06:05) (120/78 - 120/78)  BP(mean): --  RR: --  SpO2: --

## 2023-10-20 NOTE — BH INPATIENT PSYCHIATRY PROGRESS NOTE - NSBHASSESSSUMMFT_PSY_ALL_CORE
58F,  PPH of schizophrenia, not in current treatment, previously inpatient [place] resident in 2004, denies prior suicide attempt, denies substance use history, denies violence/legal issues, no known PMH, brought in by EMS; activated by daughter for bizarre behavior and worsening psychotic symptoms    10/20 Update  Patient with some minimal clinical improvement in that she is willing to talk to MD about tremor and delusional material  Overall minimally  cooperative with staff and has been dressed and out in the day room watching TV    Refused PO meds and every night had received IM meds as per court order and yesterday completed cross over to Haloperidol DEC   Patient with  some improvement in thought disorder though she remains with grandiose and Roman Catholic delusions  TOO Granted 9/26 and will continue with haloperidol trial as per court order  Received Haloperidol Decanoate 75 mg IM 10/3 as per court order and Haloperidol  mg 10/19  Haloperidol level drawn and still pending     Impression  Exacerbation of schizophrenia in the context of noncompliance     PLAN  Patient requires acute inpatient care for treatment of psychosis.   Patient admitted on a 939 emergency status 9/13 and converted to 927 on 9/15 for the purpose of TOO  Administrative meeting for TOO 9/20 and for Court Hearing 9/26 which TOO was granted and medication started  Haloperidol 10 mg /diphenhydramine 50 mg /Lorazepam 2 mg as per court order PO offered, IM if refused  10/3 Haloperidol Decanoate JUAN 75 mg and 100 mg IM 10/19  Patient does not require constant observation at this time and will follow with 15 minute checks.  Treatment will include individual therapy/supportive therapy/ rehab therapy/ psychopharmacological therapy and milieu therapy

## 2023-10-20 NOTE — BH INPATIENT PSYCHIATRY PROGRESS NOTE - NSBHMETABOLIC_PSY_ALL_CORE_FT
BMI:   HbA1c:   Glucose:   BP: 120/78 (10-20-23 @ 06:05) (120/78 - 136/84)  Lipid Panel: Date/Time: 09-14-23 @ 09:30  Cholesterol, Serum: 229  Direct LDL: --  HDL Cholesterol, Serum: 62  Total Cholesterol/HDL Ration Measurement: --  Triglycerides, Serum: 96

## 2023-10-23 PROCEDURE — 99232 SBSQ HOSP IP/OBS MODERATE 35: CPT

## 2023-10-23 NOTE — BH INPATIENT PSYCHIATRY PROGRESS NOTE - MSE UNSTRUCTURED FT
On exam today the patient remains with minimal clinical improvement    She is less irritable though more cooperative with questions but will only discuss Adventist delusional material     Speech is loud, rapid and pressured but no longer screaming when she is approached.    Thought process: with some improvement in her disorder of thought process.    Thought content: with paranoid and Adventist delusional beliefs that she is Les and  to [place]   Affect: labile from flat to irritable  Patient refuses to answer questions about suicide, homicide, perceptual disturbances, Mood, or Memory  Patient is Alert and oriented   Insight and judgment are impaired. Impulse control is tenuous at this time

## 2023-10-23 NOTE — BH INPATIENT PSYCHIATRY PROGRESS NOTE - NSBHCHARTREVIEWVS_PSY_A_CORE FT
Vital Signs Last 24 Hrs  T(C): 36 (10-23-23 @ 07:11), Max: 36 (10-23-23 @ 07:11)  T(F): 96.8 (10-23-23 @ 07:11), Max: 96.8 (10-23-23 @ 07:11)  HR: 83 (10-23-23 @ 07:11) (83 - 83)  BP: 147/72 (10-23-23 @ 07:11) (147/72 - 147/72)  BP(mean): --  RR: --  SpO2: --

## 2023-10-23 NOTE — BH INPATIENT PSYCHIATRY PROGRESS NOTE - NSBHFUPINTERVALHXFT_PSY_A_CORE
Patient is seen and evaluated for follow up of psychosis.    Chart reviewed and case discussed with treatment team.  Patient has been minimally more willing to engage with writer and talk about her Baptism delusions  Received Haloperidol Decanoate 100 mg IM 10/19 and awaiting haloperidol level

## 2023-10-23 NOTE — BH INPATIENT PSYCHIATRY PROGRESS NOTE - NSBHASSESSSUMMFT_PSY_ALL_CORE
58F,  PPH of schizophrenia, not in current treatment, previously inpatient [place] resident in 2004, denies prior suicide attempt, denies substance use history, denies violence/legal issues, no known PMH, brought in by EMS; activated by daughter for bizarre behavior and worsening psychotic symptoms    10/23 Update  Patient with some minimal clinical improvement in that she is willing to talk to MD about  her Buddhist delusions of being "Les" and  to "Graham"  Overall minimally  cooperative with staff and has been dressed and out in the day room watching TV    Refused PO meds and every night had received IM meds as per court order and yesterday completed cross over to Haloperidol DEC   Patient with  some improvement in thought disorder though she remains with grandiose and Buddhist delusions  TOO Granted 9/26 and will continue with haloperidol trial as per court order  Received Haloperidol Decanoate 75 mg IM 10/3 as per court order and Haloperidol  mg 10/19  Haloperidol level drawn and still pending but reported by lab to be ready on 10/25    Impression  Exacerbation of schizophrenia in the context of noncompliance     PLAN  Patient requires acute inpatient care for treatment of psychosis.   Patient admitted on a 939 emergency status 9/13 and converted to 927 on 9/15 for the purpose of TOO  Administrative meeting for TOO 9/20 and for Court Hearing 9/26 which TOO was granted and medication started  Haloperidol 10 mg /diphenhydramine 50 mg /Lorazepam 2 mg as per court order PO offered, IM if refused  10/3 Haloperidol Decanoate JUAN 75 mg and 100 mg IM 10/19  Patient does not require constant observation at this time and will follow with 15 minute checks.  Treatment will include individual therapy/supportive therapy/ rehab therapy/ psychopharmacological therapy and milieu therapy

## 2023-10-23 NOTE — BH INPATIENT PSYCHIATRY PROGRESS NOTE - NSBHMETABOLIC_PSY_ALL_CORE_FT
BMI:   HbA1c:   Glucose:   BP: 147/72 (10-23-23 @ 07:11) (126/69 - 147/72)  Lipid Panel: Date/Time: 09-14-23 @ 09:30  Cholesterol, Serum: 229  Direct LDL: --  HDL Cholesterol, Serum: 62  Total Cholesterol/HDL Ration Measurement: --  Triglycerides, Serum: 96

## 2023-10-24 LAB
HALOPERIDOL SERPL-MCNC: PRESENT NG/ML — SIGNIFICANT CHANGE UP (ref 1–40)
HALOPERIDOL SERPL-MCNC: PRESENT NG/ML — SIGNIFICANT CHANGE UP (ref 1–40)

## 2023-10-24 PROCEDURE — 99232 SBSQ HOSP IP/OBS MODERATE 35: CPT

## 2023-10-24 NOTE — BH INPATIENT PSYCHIATRY PROGRESS NOTE - NSBHCHARTREVIEWVS_PSY_A_CORE FT
Vital Signs Last 24 Hrs  T(C): 36.4 (10-24-23 @ 08:17), Max: 36.4 (10-24-23 @ 08:17)  T(F): 97.6 (10-24-23 @ 08:17), Max: 97.6 (10-24-23 @ 08:17)  HR: 84 (10-24-23 @ 08:17) (84 - 84)  BP: 144/78 (10-24-23 @ 08:17) (144/78 - 144/78)  BP(mean): --  RR: --  SpO2: --

## 2023-10-24 NOTE — BH INPATIENT PSYCHIATRY PROGRESS NOTE - NSBHFUPINTERVALHXFT_PSY_A_CORE
Patient is seen and evaluated for follow up of psychosis.    Chart reviewed and case discussed with treatment team.  Patient has been less cooperative again after a period of being more willing to engage with writer and talk about her Nondenominational delusions  Received Haloperidol Decanoate 100 mg IM 10/19 and awaiting haloperidol level

## 2023-10-24 NOTE — BH INPATIENT PSYCHIATRY PROGRESS NOTE - NSBHMETABOLIC_PSY_ALL_CORE_FT
BMI:   HbA1c:   Glucose:   BP: 144/78 (10-24-23 @ 08:17) (129/87 - 147/72)  Lipid Panel: Date/Time: 09-14-23 @ 09:30  Cholesterol, Serum: 229  Direct LDL: --  HDL Cholesterol, Serum: 62  Total Cholesterol/HDL Ration Measurement: --  Triglycerides, Serum: 96

## 2023-10-24 NOTE — BH INPATIENT PSYCHIATRY PROGRESS NOTE - NSBHASSESSSUMMFT_PSY_ALL_CORE
58F,  PPH of schizophrenia, not in current treatment, previously inpatient [place] resident in 2004, denies prior suicide attempt, denies substance use history, denies violence/legal issues, no known PMH, brought in by EMS; activated by daughter for bizarre behavior and worsening psychotic symptoms    10/24 Update  Patient with some regression in her behavior and her cooperation after 1 week of  minimal clinical improvement  Still with her Temple delusions of being "Les" and  to "Graham"  Overall minimally  cooperative with staff and has been dressed and out in the day room watching TV    Refused PO meds and every night had received IM meds as per court order and yesterday completed cross over to Haloperidol DEC   Patient with  some improvement in thought disorder though she remains with grandiose and Temple delusions  TOO Granted 9/26 and will continue with haloperidol trial as per court order  Received Haloperidol Decanoate 75 mg IM 10/3 as per court order and Haloperidol  mg 10/19  Haloperidol level drawn and still pending but reported by lab to be ready on 10/25    Impression  Exacerbation of schizophrenia in the context of noncompliance     PLAN  Patient requires acute inpatient care for treatment of psychosis.   Patient admitted on a 939 emergency status 9/13 and converted to 927 on 9/15 for the purpose of TOO  Administrative meeting for TOO 9/20 and for Court Hearing 9/26 which TOO was granted and medication started  Haloperidol 10 mg /diphenhydramine 50 mg /Lorazepam 2 mg as per court order PO offered, IM if refused  10/3 Haloperidol Decanoate JUAN 75 mg and 100 mg IM 10/19  Patient does not require constant observation at this time and will follow with 15 minute checks.  Treatment will include individual therapy/supportive therapy/ rehab therapy/ psychopharmacological therapy and milieu therapy

## 2023-10-24 NOTE — BH INPATIENT PSYCHIATRY PROGRESS NOTE - MSE UNSTRUCTURED FT
On exam today the patient remains with minimal clinical improvement    She is less irritable though more cooperative with questions but will only discuss Oriental orthodox delusional material     Speech is loud, rapid and pressured but no longer screaming when she is approached.    Thought process: with some improvement in her disorder of thought process.    Thought content: with paranoid and Oriental orthodox delusional beliefs that she is Les and  to Graham   Affect: labile from flat to irritable  Patient refuses to answer questions about suicide, homicide, perceptual disturbances, Mood, or Memory  Patient is Alert and oriented   Insight and judgment are impaired. Impulse control is tenuous at this time

## 2023-10-25 PROCEDURE — 99232 SBSQ HOSP IP/OBS MODERATE 35: CPT

## 2023-10-25 NOTE — BH INPATIENT PSYCHIATRY PROGRESS NOTE - MSE UNSTRUCTURED FT
On exam today the patient remains with only minimal clinical improvement    She is less irritable though more cooperative with questions but will only discuss Confucianist delusional material     Speech is loud, rapid and pressured but no longer screaming when she is approached.    Thought process: with some improvement in her disorder of thought process.    Thought content: with paranoid and Confucianist delusional beliefs that she is Les and  to Graham   Affect: labile from flat to irritable  Patient refuses to answer questions about suicide, homicide, perceptual disturbances, Mood, or Memory  Patient is Alert and oriented   Insight and judgment are impaired. Impulse control is tenuous at this time

## 2023-10-25 NOTE — BH INPATIENT PSYCHIATRY PROGRESS NOTE - NSBHCHARTREVIEWVS_PSY_A_CORE FT
Vital Signs Last 24 Hrs  T(C): 36.4 (10-25-23 @ 06:25), Max: 36.4 (10-24-23 @ 08:17)  T(F): 97.5 (10-25-23 @ 06:25), Max: 97.6 (10-24-23 @ 08:17)  HR: 86 (10-25-23 @ 06:25) (84 - 86)  BP: 137/70 (10-25-23 @ 06:25) (137/70 - 144/78)  BP(mean): --  RR: --  SpO2: --

## 2023-10-25 NOTE — BH INPATIENT PSYCHIATRY PROGRESS NOTE - NSBHASSESSSUMMFT_PSY_ALL_CORE
58F,  PPH of schizophrenia, not in current treatment, previously inpatient [place] resident in 2004, denies prior suicide attempt, denies substance use history, denies violence/legal issues, no known PMH, brought in by EMS; activated by daughter for bizarre behavior and worsening psychotic symptoms    10/25 Update  Patient with some regression the last few days in her behavior and her cooperation after 1 week of  some clinical improvement  Still with her Anabaptist delusions of being "Les" and  to "Graham"  Overall minimally  cooperative with staff and has been dressed and out in the day room watching TV    Refused PO meds and every night had received IM meds as per court order and yesterday completed cross over to Haloperidol DEC   Patient with  some improvement in thought disorder though she remains with grandiose and Anabaptist delusions  TOO Granted 9/26 and will continue with haloperidol trial as per court order  Received Haloperidol Decanoate 75 mg IM 10/3 as per court order and Haloperidol  mg 10/19  Haloperidol level drawn but quantity of blood was insufficient and will redraw    Impression  Exacerbation of schizophrenia in the context of noncompliance     PLAN  Patient requires acute inpatient care for treatment of psychosis.   Patient admitted on a 939 emergency status 9/13 and converted to 927 on 9/15 for the purpose of TOO  Administrative meeting for TOO 9/20 and for Court Hearing 9/26 which TOO was granted and medication started  Haloperidol 10 mg /diphenhydramine 50 mg /Lorazepam 2 mg as per court order PO offered, IM if refused  10/3 Haloperidol Decanoate JUAN 75 mg and 100 mg IM 10/19  Patient does not require constant observation at this time and will follow with 15 minute checks.  Treatment will include individual therapy/supportive therapy/ rehab therapy/ psychopharmacological therapy and milieu therapy

## 2023-10-25 NOTE — BH INPATIENT PSYCHIATRY PROGRESS NOTE - NSBHMETABOLIC_PSY_ALL_CORE_FT
BMI:   HbA1c:   Glucose:   BP: 137/70 (10-25-23 @ 06:25) (137/70 - 147/72)  Lipid Panel: Date/Time: 09-14-23 @ 09:30  Cholesterol, Serum: 229  Direct LDL: --  HDL Cholesterol, Serum: 62  Total Cholesterol/HDL Ration Measurement: --  Triglycerides, Serum: 96

## 2023-10-25 NOTE — BH INPATIENT PSYCHIATRY PROGRESS NOTE - NSBHFUPINTERVALHXFT_PSY_A_CORE
Patient is seen and evaluated for follow up of psychosis.    Chart reviewed and case discussed with treatment team.  Patient has been less cooperative again after a period of being more willing to engage with writer and talk about her Orthodoxy delusions  Received Haloperidol Decanoate 100 mg IM 10/19 and there was not enough blood for haloperidol level

## 2023-10-26 PROCEDURE — 99231 SBSQ HOSP IP/OBS SF/LOW 25: CPT

## 2023-10-26 NOTE — BH INPATIENT PSYCHIATRY PROGRESS NOTE - MSE UNSTRUCTURED FT
On exam today the patient remains with some minimal clinical improvement    She is less irritable though more cooperative with questions but will only discuss Pentecostal delusional material     Speech is loud,  but less pressured but no longer screaming when she is approached.    Thought process: with some improvement in her disorder of thought process.    Thought content: with paranoid and Pentecostal delusional beliefs that she is Les and  to Graham   Affect: labile from flat to irritable  Patient refuses to answer questions about suicide, homicide, perceptual disturbances, Mood, or Memory  Patient is Alert and oriented   Insight and judgment are impaired. Impulse control is tenuous at this time

## 2023-10-26 NOTE — BH PATIENT CHARACTERISTICS SURVEY - INTELLECTUAL DISABILITY:
Does not need any more pneumonia vaccine till age 74  covid decision is his to make   I prefer to stay with the [facility] No

## 2023-10-26 NOTE — BH INPATIENT PSYCHIATRY PROGRESS NOTE - NSBHMETABOLIC_PSY_ALL_CORE_FT
BMI:   HbA1c:   Glucose:   BP: 155/85 (10-26-23 @ 06:35) (137/70 - 155/85)  Lipid Panel: Date/Time: 09-14-23 @ 09:30  Cholesterol, Serum: 229  Direct LDL: --  HDL Cholesterol, Serum: 62  Total Cholesterol/HDL Ration Measurement: --  Triglycerides, Serum: 96

## 2023-10-26 NOTE — BH INPATIENT PSYCHIATRY PROGRESS NOTE - NSBHCHARTREVIEWVS_PSY_A_CORE FT
Vital Signs Last 24 Hrs  T(C): 35.9 (10-26-23 @ 06:35), Max: 35.9 (10-26-23 @ 06:35)  T(F): 96.6 (10-26-23 @ 06:35), Max: 96.6 (10-26-23 @ 06:35)  HR: 84 (10-26-23 @ 06:35) (84 - 84)  BP: 155/85 (10-26-23 @ 06:35) (155/85 - 155/85)  BP(mean): --  RR: --  SpO2: --

## 2023-10-26 NOTE — BH INPATIENT PSYCHIATRY PROGRESS NOTE - NSBHFUPINTERVALHXFT_PSY_A_CORE
Patient is seen and evaluated for follow up of psychosis.    Chart reviewed and case discussed with treatment team.  Patient has been less cooperative again after a period of being more willing to engage with writer and talk about her Pentecostalism delusions  Received Haloperidol Decanoate 100 mg IM 10/19  Haloperidol level  redrawn

## 2023-10-26 NOTE — BH INPATIENT PSYCHIATRY PROGRESS NOTE - NSBHASSESSSUMMFT_PSY_ALL_CORE
58F,  PPH of schizophrenia, not in current treatment, previously inpatient [place] resident in 2004, denies prior suicide attempt, denies substance use history, denies violence/legal issues, no known PMH, brought in by EMS; activated by daughter for bizarre behavior and worsening psychotic symptoms    10/26 Update  Patient with some regression in her behavior and her cooperation after 1 week of  some clinical improvement  Still with her Yazdanism delusions of being "Les" and  to "Graham"  Overall minimally  cooperative with staff and has been dressed and out in the day room watching TV    Refused PO meds and every night had received IM meds as per court order and yesterday completed cross over to Haloperidol DEC   Patient with  some improvement in thought disorder though she remains with grandiose and Yazdanism delusions  TOO Granted 9/26 and will continue with haloperidol trial as per court order  Received Haloperidol Decanoate 75 mg IM 10/3 as per court order and Haloperidol  mg 10/19  Haloperidol level drawn but quantity of blood was insufficient and was redrawn 10/26    Impression  Exacerbation of schizophrenia in the context of noncompliance     PLAN  Patient requires acute inpatient care for treatment of psychosis.   Patient admitted on a 939 emergency status 9/13 and converted to 927 on 9/15 for the purpose of TOO  Administrative meeting for TOO 9/20 and for Court Hearing 9/26 which TOO was granted and medication started  Haloperidol 10 mg /diphenhydramine 50 mg /Lorazepam 2 mg as per court order PO offered, IM if refused  10/3 Haloperidol Decanoate JUAN 75 mg and 100 mg IM 10/19  Patient does not require constant observation at this time and will follow with 15 minute checks.  Treatment will include individual therapy/supportive therapy/ rehab therapy/ psychopharmacological therapy and milieu therapy

## 2023-10-27 PROCEDURE — 99231 SBSQ HOSP IP/OBS SF/LOW 25: CPT

## 2023-10-27 NOTE — BH INPATIENT PSYCHIATRY PROGRESS NOTE - NSBHFUPINTERVALHXFT_PSY_A_CORE
Patient is seen and evaluated for follow up of psychosis.    Chart reviewed and case discussed with treatment team.  Patient has been poorly cooperative again after a period of being more willing to engage with writer and talk about her Baptism delusions  Received Haloperidol Decanoate 100 mg IM 10/19

## 2023-10-27 NOTE — BH INPATIENT PSYCHIATRY PROGRESS NOTE - NSBHCHARTREVIEWVS_PSY_A_CORE FT
Vital Signs Last 24 Hrs  T(C): --  T(F): --  HR: 94 (10-27-23 @ 05:32) (94 - 94)  BP: 145/99 (10-27-23 @ 05:32) (145/99 - 145/99)  BP(mean): --  RR: --  SpO2: --

## 2023-10-27 NOTE — BH INPATIENT PSYCHIATRY PROGRESS NOTE - NSBHASSESSSUMMFT_PSY_ALL_CORE
58F,  PPH of schizophrenia, not in current treatment, previously inpatient [place] resident in 2004, denies prior suicide attempt, denies substance use history, denies violence/legal issues, no known PMH, brought in by EMS; activated by daughter for bizarre behavior and worsening psychotic symptoms    10/27 Update  Patient with regression in her behavior and her cooperation after 1 week of  some clinical improvement  Still with her Jehovah's witness delusions of being "Les" and  to "Graham"  Overall minimally  cooperative with staff and has been dressed and out in the day room watching TV    Refused PO meds and every night had received IM meds as per court order and yesterday completed cross over to Haloperidol DEC   Patient with  some improvement in thought disorder though she remains with grandiose and Jehovah's witness delusions  TOO Granted 9/26 and will continue with haloperidol trial as per court order  Received Haloperidol Decanoate 75 mg IM 10/3 as per court order and Haloperidol  mg 10/19 next in 2 weeks 11/2  Haloperidol level drawn but quantity of blood was insufficient and was redrawn 10/26    Impression  Exacerbation of schizophrenia in the context of noncompliance     PLAN  Patient requires acute inpatient care for treatment of psychosis.   Patient admitted on a 939 emergency status 9/13 and converted to 927 on 9/15 for the purpose of TOO  Administrative meeting for TOO 9/20 and for Court Hearing 9/26 which TOO was granted and medication started  Haloperidol 10 mg /diphenhydramine 50 mg /Lorazepam 2 mg as per court order PO offered, IM if refused  10/3 Haloperidol Decanoate JUAN 75 mg and 100 mg IM 10/19  Patient does not require constant observation at this time and will follow with 15 minute checks.  Treatment will include individual therapy/supportive therapy/ rehab therapy/ psychopharmacological therapy and milieu therapy

## 2023-10-27 NOTE — BH INPATIENT PSYCHIATRY PROGRESS NOTE - NSBHMETABOLIC_PSY_ALL_CORE_FT
BMI:   HbA1c:   Glucose:   BP: 145/99 (10-27-23 @ 05:32) (137/70 - 155/85)  Lipid Panel: Date/Time: 09-14-23 @ 09:30  Cholesterol, Serum: 229  Direct LDL: --  HDL Cholesterol, Serum: 62  Total Cholesterol/HDL Ration Measurement: --  Triglycerides, Serum: 96

## 2023-10-27 NOTE — BH INPATIENT PSYCHIATRY PROGRESS NOTE - MSE UNSTRUCTURED FT
On exam today the patient remains poorly cooperative     She is less irritable though more cooperative with questions but will only discuss Mandaeism delusional material     Speech is loud,  but less pressured but no longer screaming when she is approached.    Thought process: with some improvement in her disorder of thought process.    Thought content: with paranoid and Mandaeism delusional beliefs that she is Les and  to Graham   Affect: labile from flat to irritable  Patient refuses to answer questions about suicide, homicide, perceptual disturbances, Mood, or Memory  Patient is Alert and oriented   Insight and judgment are impaired. Impulse control is tenuous at this time

## 2023-10-30 PROCEDURE — 99231 SBSQ HOSP IP/OBS SF/LOW 25: CPT

## 2023-10-30 RX ORDER — HALOPERIDOL DECANOATE 100 MG/ML
100 INJECTION INTRAMUSCULAR ONCE
Refills: 0 | Status: COMPLETED | OUTPATIENT
Start: 2023-11-02 | End: 2023-11-02

## 2023-10-30 NOTE — BH INPATIENT PSYCHIATRY PROGRESS NOTE - MSE UNSTRUCTURED FT
On exam today the patient remains poorly cooperative     She is less irritable though more cooperative with questions but will only discuss Caodaism delusional material     Speech is loud,  but less pressured but no longer screaming when she is approached.    Thought process: with some improvement in her disorder of thought process.    Thought content: with paranoid and Caodaism delusional beliefs that she is Les and  to Graham   Affect: labile from flat to irritable  Patient refuses to answer questions about suicide, homicide, perceptual disturbances, Mood, or Memory  Patient is Alert and oriented   Insight and judgment are impaired. Impulse control is tenuous at this time

## 2023-10-30 NOTE — BH INPATIENT PSYCHIATRY PROGRESS NOTE - NSBHASSESSSUMMFT_PSY_ALL_CORE
58F,  PPH of schizophrenia, not in current treatment, previously inpatient [place] resident in 2004, denies prior suicide attempt, denies substance use history, denies violence/legal issues, no known PMH, brought in by EMS; activated by daughter for bizarre behavior and worsening psychotic symptoms    10/27 Update  Patient with regression in her behavior and her cooperation after 1 week of  some clinical improvement  Still with her Episcopalian delusions of being "Les" and  to "Graham"  Overall minimally  cooperative with staff and has been dressed and out in the day room watching TV    Refused PO meds and every night had received IM meds as per court order and yesterday completed cross over to Haloperidol DEC   Patient with  some improvement in thought disorder though she remains with grandiose and Episcopalian delusions  TOO Granted 9/26 and will continue with haloperidol trial as per court order  Received Haloperidol Decanoate 75 mg IM 10/3 as per court order and Haloperidol  mg 10/19 next in 2 weeks 11/2  Haloperidol level drawn but quantity of blood was insufficient and was redrawn 10/26    Impression  Exacerbation of schizophrenia in the context of noncompliance     PLAN  Patient requires acute inpatient care for treatment of psychosis.   Patient admitted on a 939 emergency status 9/13 and converted to 927 on 9/15 for the purpose of TOO  Administrative meeting for TOO 9/20 and for Court Hearing 9/26 which TOO was granted and medication started  Haloperidol 10 mg /diphenhydramine 50 mg /Lorazepam 2 mg as per court order PO offered, IM if refused  10/3 Haloperidol Decanoate JUAN 75 mg and 100 mg IM 10/19  Patient does not require constant observation at this time and will follow with 15 minute checks.  Treatment will include individual therapy/supportive therapy/ rehab therapy/ psychopharmacological therapy and milieu therapy

## 2023-10-30 NOTE — BH INPATIENT PSYCHIATRY PROGRESS NOTE - NSBHMETABOLIC_PSY_ALL_CORE_FT
BMI:   HbA1c:   Glucose:   BP: 134/62 (10-30-23 @ 06:15) (108/79 - 134/62)  Lipid Panel: Date/Time: 09-14-23 @ 09:30  Cholesterol, Serum: 229  Direct LDL: --  HDL Cholesterol, Serum: 62  Total Cholesterol/HDL Ration Measurement: --  Triglycerides, Serum: 96

## 2023-10-30 NOTE — BH INPATIENT PSYCHIATRY PROGRESS NOTE - NSBHFUPINTERVALHXFT_PSY_A_CORE
Patient is seen and evaluated for follow up of psychosis.    Chart reviewed and case discussed with treatment team.  Patient has been poorly cooperative again after a period of being more willing to engage with writer and talk about her Islam delusions  Received Haloperidol Decanoate 100 mg IM 10/19

## 2023-10-30 NOTE — BH INPATIENT PSYCHIATRY PROGRESS NOTE - NSBHCHARTREVIEWVS_PSY_A_CORE FT
Vital Signs Last 24 Hrs  T(C): 36.2 (10-30-23 @ 06:15), Max: 36.4 (10-29-23 @ 08:35)  T(F): 97.2 (10-30-23 @ 06:15), Max: 97.6 (10-29-23 @ 08:35)  HR: 88 (10-30-23 @ 06:15) (87 - 88)  BP: 134/62 (10-30-23 @ 06:15) (108/79 - 134/62)  BP(mean): --  RR: --  SpO2: --

## 2023-10-31 PROCEDURE — 99231 SBSQ HOSP IP/OBS SF/LOW 25: CPT

## 2023-10-31 NOTE — BH INPATIENT PSYCHIATRY PROGRESS NOTE - NSBHFUPINTERVALHXFT_PSY_A_CORE
Patient is seen and evaluated for follow up of psychosis.    Chart reviewed and case discussed with treatment team.  Patient angry on approach today   Refusing to discuss her treatment  Telling writer she is Les and to talk to God  Received Haloperidol Decanoate 100 mg IM 10/19

## 2023-10-31 NOTE — BH INPATIENT PSYCHIATRY PROGRESS NOTE - PRN MEDS
MEDICATIONS  (PRN):  diphenhydrAMINE 50 milliGRAM(s) Oral every 4 hours PRN agitation/EPS  diphenhydrAMINE Injectable 50 milliGRAM(s) IntraMuscular at bedtime PRN EPS Prohylaxis  haloperidol     Tablet 5 milliGRAM(s) Oral every 4 hours PRN agitation  haloperidol    Injectable 5 milliGRAM(s) IntraMuscular Once PRN agitation  LORazepam   Injectable 2 milliGRAM(s) IntraMuscular once PRN severe agitation

## 2023-10-31 NOTE — BH INPATIENT PSYCHIATRY PROGRESS NOTE - NSBHCHARTREVIEWVS_PSY_A_CORE FT
Vital Signs Last 24 Hrs  T(C): 36.2 (10-31-23 @ 06:27), Max: 36.2 (10-31-23 @ 06:27)  T(F): 97.2 (10-31-23 @ 06:27), Max: 97.2 (10-31-23 @ 06:27)  HR: 91 (10-31-23 @ 06:27) (91 - 91)  BP: 145/83 (10-31-23 @ 06:27) (145/83 - 145/83)  BP(mean): --  RR: --  SpO2: --

## 2023-10-31 NOTE — BH INPATIENT PSYCHIATRY PROGRESS NOTE - NSBHMETABOLIC_PSY_ALL_CORE_FT
BMI:   HbA1c:   Glucose:   BP: 145/83 (10-31-23 @ 06:27) (108/79 - 145/83)  Lipid Panel: Date/Time: 09-14-23 @ 09:30  Cholesterol, Serum: 229  Direct LDL: --  HDL Cholesterol, Serum: 62  Total Cholesterol/HDL Ration Measurement: --  Triglycerides, Serum: 96

## 2023-10-31 NOTE — BH INPATIENT PSYCHIATRY PROGRESS NOTE - CURRENT MEDICATION
MEDICATIONS  (STANDING):    MEDICATIONS  (PRN):  diphenhydrAMINE 50 milliGRAM(s) Oral every 4 hours PRN agitation/EPS  diphenhydrAMINE Injectable 50 milliGRAM(s) IntraMuscular at bedtime PRN EPS Prohylaxis  haloperidol     Tablet 5 milliGRAM(s) Oral every 4 hours PRN agitation  haloperidol    Injectable 5 milliGRAM(s) IntraMuscular Once PRN agitation  LORazepam   Injectable 2 milliGRAM(s) IntraMuscular once PRN severe agitation

## 2023-10-31 NOTE — BH INPATIENT PSYCHIATRY PROGRESS NOTE - NSBHASSESSSUMMFT_PSY_ALL_CORE
58F,  PPH of schizophrenia, not in current treatment, previously inpatient [place] resident in 2004, denies prior suicide attempt, denies substance use history, denies violence/legal issues, no known PMH, brought in by EMS; activated by daughter for bizarre behavior and worsening psychotic symptoms    10/27 Update  Patient with regression in her behavior and her cooperation after 1 week of  some clinical improvement  Still with her Zoroastrian delusions of being "Les" and  to "Graham"  Overall minimally  cooperative with staff and has been dressed and out in the day room watching TV    Refused PO meds and every night had received IM meds as per court order and yesterday completed cross over to Haloperidol DEC   Patient with  some improvement in thought disorder though she remains with grandiose and Zoroastrian delusions  TOO Granted 9/26 and will continue with haloperidol trial as per court order  Received Haloperidol Decanoate 75 mg IM 10/3 as per court order and Haloperidol  mg 10/19 next in 2 weeks 11/2  Haloperidol level drawn but quantity of blood was insufficient and was redrawn 10/26    Impression  Exacerbation of schizophrenia in the context of noncompliance     PLAN  Patient requires acute inpatient care for treatment of psychosis.   Patient admitted on a 939 emergency status 9/13 and converted to 927 on 9/15 for the purpose of TOO  Administrative meeting for TOO 9/20 and for Court Hearing 9/26 which TOO was granted and medication started  Haloperidol 10 mg /diphenhydramine 50 mg /Lorazepam 2 mg as per court order PO offered, IM if refused  10/3 Haloperidol Decanoate JUAN 75 mg and 100 mg IM 10/19  Patient does not require constant observation at this time and will follow with 15 minute checks.  Treatment will include individual therapy/supportive therapy/ rehab therapy/ psychopharmacological therapy and milieu therapy

## 2023-10-31 NOTE — BH INPATIENT PSYCHIATRY PROGRESS NOTE - MSE UNSTRUCTURED FT
On exam today the patient  is more irritable and poorly cooperative     She is less irritable though more cooperative with questions but will only discuss Congregational delusional material     Speech is loud,  but less pressured but no longer screaming when she is approached.    Thought process: with some improvement in her disorder of thought process.    Thought content: with paranoid and Congregational delusional beliefs that she is Les and  to Graham   Affect: labile from flat to irritable  Patient refuses to answer questions about suicide, homicide, perceptual disturbances, Mood, or Memory  Patient is Alert and oriented   Insight and judgment are impaired. Impulse control is tenuous at this time

## 2023-11-01 PROCEDURE — 99231 SBSQ HOSP IP/OBS SF/LOW 25: CPT

## 2023-11-01 NOTE — BH INPATIENT PSYCHIATRY PROGRESS NOTE - MSE UNSTRUCTURED FT
On exam today the patient  is more irritable and poorly cooperative     She is less irritable though more cooperative with questions but will only discuss Confucianism delusional material     Speech is loud,  but less pressured but no longer screaming when she is approached.    Thought process: with some improvement in her disorder of thought process.    Thought content: with paranoid and Confucianism delusional beliefs that she is Les and  to Graham   Affect: labile from flat to irritable  Patient refuses to answer questions about suicide, homicide, perceptual disturbances, Mood, or Memory  Patient is Alert and oriented   Insight and judgment are impaired. Impulse control is tenuous at this time

## 2023-11-01 NOTE — BH INPATIENT PSYCHIATRY PROGRESS NOTE - NSBHMETABOLIC_PSY_ALL_CORE_FT
BMI:   HbA1c:   Glucose:   BP: 145/83 (10-31-23 @ 06:27) (134/62 - 145/83)  Lipid Panel: Date/Time: 09-14-23 @ 09:30  Cholesterol, Serum: 229  Direct LDL: --  HDL Cholesterol, Serum: 62  Total Cholesterol/HDL Ration Measurement: --  Triglycerides, Serum: 96

## 2023-11-01 NOTE — ED ADULT TRIAGE NOTE - ESI TRIAGE ACUITY LEVEL, MLM
DISPLAY PLAN FREE TEXT DISPLAY PLAN FREE TEXT DISPLAY PLAN FREE TEXT 3 DISPLAY PLAN FREE TEXT DISPLAY PLAN FREE TEXT DISPLAY PLAN FREE TEXT

## 2023-11-01 NOTE — BH INPATIENT PSYCHIATRY PROGRESS NOTE - NSBHASSESSSUMMFT_PSY_ALL_CORE
58F,  PPH of schizophrenia, not in current treatment, previously inpatient [place] resident in 2004, denies prior suicide attempt, denies substance use history, denies violence/legal issues, no known PMH, brought in by EMS; activated by daughter for bizarre behavior and worsening psychotic symptoms    11/1 Update  Patient calmer overall but with regression in her cooperation after 1 week of  some clinical improvement  Refused PO court ordered meds and every night had received IM meds as per court order and yesterday completed cross over to Haloperidol DEC   Patient with  some improvement in thought disorder though she remains with grandiose and Uatsdin delusions  TOO Granted 9/26 and will continue with haloperidol trial as per court order  Received Haloperidol Decanoate 75 mg IM 10/3 as per court order and Haloperidol  mg 10/19 next in 2 weeks 11/2  Haloperidol level drawn but quantity of blood was insufficient and was redrawn 10/26 and pending    Impression  Exacerbation of schizophrenia in the context of noncompliance     PLAN  Patient requires acute inpatient care for treatment of psychosis.   Patient admitted on a 939 emergency status 9/13 and converted to 927 on 9/15 for the purpose of TOO  Administrative meeting for TOO 9/20 and for Court Hearing 9/26 which TOO was granted and medication started  Haloperidol 10 mg /diphenhydramine 50 mg /Lorazepam 2 mg as per court order PO offered, IM if refused  10/3 Haloperidol Decanoate JUAN 75 mg and 100 mg IM 10/19  Patient does not require constant observation at this time and will follow with 15 minute checks.  Treatment will include individual therapy/supportive therapy/ rehab therapy/ psychopharmacological therapy and milieu therapy

## 2023-11-01 NOTE — BH INPATIENT PSYCHIATRY PROGRESS NOTE - NSBHCHARTREVIEWVS_PSY_A_CORE FT
Vital Signs Last 24 Hrs  T(C): 35.6 (11-01-23 @ 07:32), Max: 35.6 (11-01-23 @ 07:32)  T(F): 96 (11-01-23 @ 07:32), Max: 96 (11-01-23 @ 07:32)  HR: --  BP: --  BP(mean): --  RR: --  SpO2: --    Orthostatic VS  11-01-23 @ 07:32  Lying BP: --/-- HR: --  Sitting BP: 139/84 HR: 88  Standing BP: --/-- HR: --  Site: --  Mode: --

## 2023-11-01 NOTE — BH INPATIENT PSYCHIATRY PROGRESS NOTE - NSBHFUPINTERVALHXFT_PSY_A_CORE
Patient is seen and evaluated for follow up of psychosis.    Chart reviewed and case discussed with treatment team.  Patient angry on approach today   Refusing to discuss her treatment  Telling writer she has no reason to meet with me   Received last Haloperidol Decanoate 100 mg IM 10/19

## 2023-11-02 PROCEDURE — 99231 SBSQ HOSP IP/OBS SF/LOW 25: CPT

## 2023-11-02 RX ADMIN — HALOPERIDOL DECANOATE 100 MILLIGRAM(S): 100 INJECTION INTRAMUSCULAR at 13:36

## 2023-11-02 NOTE — BH INPATIENT PSYCHIATRY PROGRESS NOTE - NSBHCHARTREVIEWVS_PSY_A_CORE FT
Vital Signs Last 24 Hrs  T(C): 36.1 (11-02-23 @ 08:20), Max: 36.1 (11-02-23 @ 08:20)  T(F): 97 (11-02-23 @ 08:20), Max: 97 (11-02-23 @ 08:20)  HR: --  BP: --  BP(mean): --  RR: --  SpO2: --    Orthostatic VS  11-02-23 @ 08:20  Lying BP: --/-- HR: --  Sitting BP: 151/88 HR: 82  Standing BP: --/-- HR: --  Site: --  Mode: --  Orthostatic VS  11-01-23 @ 07:32  Lying BP: --/-- HR: --  Sitting BP: 139/84 HR: 88  Standing BP: --/-- HR: --  Site: --  Mode: --

## 2023-11-02 NOTE — BH INPATIENT PSYCHIATRY PROGRESS NOTE - NSBHASSESSSUMMFT_PSY_ALL_CORE
58F,  PPH of schizophrenia, not in current treatment, previously inpatient [place] resident in 2004, denies prior suicide attempt, denies substance use history, denies violence/legal issues, no known PMH, brought in by EMS; activated by daughter for bizarre behavior and worsening psychotic symptoms    11/02 Update  Patient calmer overall but with regression in her cooperation after 1 week of  some clinical improvement  Refused PO court ordered meds and every night had received IM meds as per court order and yesterday completed cross over to Haloperidol DEC   Patient with  some improvement in thought disorder though she remains with grandiose and Pentecostalism delusions  TOO Granted 9/26 and will continue with haloperidol trial as per court order  Received Haloperidol Decanoate 75 mg IM 10/3 as per court order and Haloperidol  mg 10/19, and 11/2  Haloperidol level drawn but quantity of blood was insufficient and was redrawn 10/26 and pending    Impression  Exacerbation of schizophrenia in the context of noncompliance     PLAN  Patient requires acute inpatient care for treatment of psychosis.   Patient admitted on a 939 emergency status 9/13 and converted to 927 on 9/15 for the purpose of TOO  Administrative meeting for TOO 9/20 and for Court Hearing 9/26 which TOO was granted and medication started  Haloperidol 10 mg /diphenhydramine 50 mg /Lorazepam 2 mg as per court order PO offered, IM if refused  10/3 Haloperidol Decanoate JUAN 75 mg and 100 mg IM 10/19  Patient does not require constant observation at this time and will follow with 15 minute checks.  Treatment will include individual therapy/supportive therapy/ rehab therapy/ psychopharmacological therapy and milieu therapy

## 2023-11-02 NOTE — BH INPATIENT PSYCHIATRY PROGRESS NOTE - NSBHFUPINTERVALHXFT_PSY_A_CORE
Patient is seen and evaluated for follow up of psychosis.    Chart reviewed and case discussed with treatment team.  Patient angry on approach again  Refusing to discuss her treatment  Telling writer she has no reason to meet with me   Received last Haloperidol Decanoate 100 mg IM 10/19

## 2023-11-02 NOTE — BH INPATIENT PSYCHIATRY PROGRESS NOTE - NSBHMETABOLIC_PSY_ALL_CORE_FT
BMI:   HbA1c:   Glucose:   BP: 145/83 (10-31-23 @ 06:27) (145/83 - 145/83)  Lipid Panel: Date/Time: 09-14-23 @ 09:30  Cholesterol, Serum: 229  Direct LDL: --  HDL Cholesterol, Serum: 62  Total Cholesterol/HDL Ration Measurement: --  Triglycerides, Serum: 96

## 2023-11-02 NOTE — BH INPATIENT PSYCHIATRY PROGRESS NOTE - MSE UNSTRUCTURED FT
On exam today the patient  is irritable and poorly cooperative     She is less irritable with peers     Speech is loud,  but less pressured but no longer screaming when she is approached.    Thought process: with some improvement in her disorder of thought process.    Thought content: with paranoid and Synagogue delusional beliefs that she is Les and  to Graham   Affect: labile from flat to irritable  Patient refuses to answer questions about suicide, homicide, perceptual disturbances, Mood, or Memory  Patient is Alert and oriented   Insight and judgment are impaired. Impulse control is tenuous at this time

## 2023-11-03 LAB
HALOPERIDOL SERPL-MCNC: 2.6 NG/ML — SIGNIFICANT CHANGE UP (ref 1–40)
HALOPERIDOL SERPL-MCNC: 2.6 NG/ML — SIGNIFICANT CHANGE UP (ref 1–40)

## 2023-11-03 PROCEDURE — 99231 SBSQ HOSP IP/OBS SF/LOW 25: CPT

## 2023-11-03 NOTE — BH INPATIENT PSYCHIATRY PROGRESS NOTE - NSBHFUPINTERVALHXFT_PSY_A_CORE
Patient is seen and evaluated for follow up of psychosis.    Chart reviewed and case discussed with treatment team.  Patient now pleasant and verbal on approach  Received last Haloperidol Decanoate 100 mg IM 10/19     Patient is seen and evaluated for follow up of psychosis.  Chart reviewed and case discussed with treatment team.  Patient now pleasant and verbal on approach.  More engaged and took JUAN yesterday, no EPS.  No somatic complaints, no agitation.     Patient is seen and evaluated for follow up of psychosis.  Chart reviewed and case discussed with treatment team.  Patient now pleasant and verbal on approach.  More engaged and took JUAN yesterday (Haldol Dec 100mg), no EPS.  No somatic complaints, no agitation.

## 2023-11-03 NOTE — BH INPATIENT PSYCHIATRY PROGRESS NOTE - NSBHLEGALSTATUSCHANGE_PSY_ALL_CORE
[de-identified] : This is an 89-year-old woman presenting with worsening anemia.\par Patient was started on oral iron once a day by her primary care doctor.\par \par She reports black stools since starting iron in feb 2022 when found anemia.  No blood or black stool before that. No change in bowel habits\par Now has skinnier stools now .  No \par No pain or reflux in esophagus , no dysphagia  No nausea or vomiting \par Loosing weight - 6months , 20pounds . states that she  eating the same \par Feeling more SOB then usual also , about 6 months ago , sent pulmonary . Lungs ok. \par Even walking on flat surfaces. 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 , had fall and left leg - knee and hip also . \par \par Hx of right hip total replacement \par Total shoulder was recommended \par \par Just switched to dr jones , in feb 2022 , patient was never told anemic \par \par Had colonoscopy and egd  over 5 yrs ago \par Hx of esophageal problem with concern for monteiro , was doing feqent egd, before 2020. \par \par 3/2022- ct scan shows thickening in colon and stomach \par  [de-identified] : saw Dr. Pringle- wants her on omeprazole for 3 months, will repeat another EGD possibly next month.  No

## 2023-11-03 NOTE — BH INPATIENT PSYCHIATRY PROGRESS NOTE - CURRENT MEDICATION
MEDICATIONS  (STANDING):    MEDICATIONS  (PRN):  diphenhydrAMINE 50 milliGRAM(s) Oral every 4 hours PRN agitation/EPS  diphenhydrAMINE Injectable 50 milliGRAM(s) IntraMuscular at bedtime PRN EPS Prohylaxis  haloperidol     Tablet 5 milliGRAM(s) Oral every 4 hours PRN agitation  haloperidol    Injectable 5 milliGRAM(s) IntraMuscular Once PRN agitation   MEDICATIONS  (STANDING):    MEDICATIONS  (PRN):  benzocaine/menthol Lozenge 1 Lozenge Oral every 4 hours PRN sore throat  diphenhydrAMINE 50 milliGRAM(s) Oral every 4 hours PRN agitation/EPS  diphenhydrAMINE Injectable 50 milliGRAM(s) IntraMuscular at bedtime PRN EPS Prohylaxis  haloperidol     Tablet 5 milliGRAM(s) Oral every 4 hours PRN agitation  haloperidol    Injectable 5 milliGRAM(s) IntraMuscular Once PRN agitation  sodium chloride 0.65% Nasal 1 Spray(s) Both Nostrils every 2 hours PRN Nasal Congestion

## 2023-11-03 NOTE — BH INPATIENT PSYCHIATRY PROGRESS NOTE - NSBHCHARTREVIEWVS_PSY_A_CORE FT
Vital Signs Last 24 Hrs  T(C): 36.3 (11-03-23 @ 08:49), Max: 36.3 (11-03-23 @ 08:49)  T(F): 97.4 (11-03-23 @ 08:49), Max: 97.4 (11-03-23 @ 08:49)  HR: 105 (11-03-23 @ 08:49) (105 - 105)  BP: 137/78 (11-03-23 @ 08:49) (137/78 - 137/78)  BP(mean): --  RR: --  SpO2: --    Orthostatic VS  11-02-23 @ 08:20  Lying BP: --/-- HR: --  Sitting BP: 151/88 HR: 82  Standing BP: --/-- HR: --  Site: --  Mode: --   Vital Signs Last 24 Hrs  T(C): 36.8 (11-06-23 @ 06:41), Max: 36.8 (11-06-23 @ 06:41)  T(F): 98.3 (11-06-23 @ 06:41), Max: 98.3 (11-06-23 @ 06:41)  HR: --  BP: 144/86 (11-06-23 @ 06:41) (144/86 - 144/86)  BP(mean): 96 (11-06-23 @ 06:41) (96 - 96)  RR: --  SpO2: --

## 2023-11-03 NOTE — BH INPATIENT PSYCHIATRY PROGRESS NOTE - PRN MEDS
MEDICATIONS  (PRN):  diphenhydrAMINE 50 milliGRAM(s) Oral every 4 hours PRN agitation/EPS  diphenhydrAMINE Injectable 50 milliGRAM(s) IntraMuscular at bedtime PRN EPS Prohylaxis  haloperidol     Tablet 5 milliGRAM(s) Oral every 4 hours PRN agitation  haloperidol    Injectable 5 milliGRAM(s) IntraMuscular Once PRN agitation   MEDICATIONS  (PRN):  benzocaine/menthol Lozenge 1 Lozenge Oral every 4 hours PRN sore throat  diphenhydrAMINE 50 milliGRAM(s) Oral every 4 hours PRN agitation/EPS  diphenhydrAMINE Injectable 50 milliGRAM(s) IntraMuscular at bedtime PRN EPS Prohylaxis  haloperidol     Tablet 5 milliGRAM(s) Oral every 4 hours PRN agitation  haloperidol    Injectable 5 milliGRAM(s) IntraMuscular Once PRN agitation  sodium chloride 0.65% Nasal 1 Spray(s) Both Nostrils every 2 hours PRN Nasal Congestion

## 2023-11-03 NOTE — BH INPATIENT PSYCHIATRY PROGRESS NOTE - NSBHMETABOLIC_PSY_ALL_CORE_FT
BMI:   HbA1c:   Glucose:   BP: 137/78 (11-03-23 @ 08:49) (137/78 - 137/78)  Lipid Panel: Date/Time: 09-14-23 @ 09:30  Cholesterol, Serum: 229  Direct LDL: --  HDL Cholesterol, Serum: 62  Total Cholesterol/HDL Ration Measurement: --  Triglycerides, Serum: 96   BMI:   HbA1c:   Glucose:   BP: 144/86 (11-06-23 @ 06:41) (131/74 - 144/86)  Lipid Panel: Date/Time: 09-14-23 @ 09:30  Cholesterol, Serum: 229  Direct LDL: --  HDL Cholesterol, Serum: 62  Total Cholesterol/HDL Ration Measurement: --  Triglycerides, Serum: 96

## 2023-11-03 NOTE — BH INPATIENT PSYCHIATRY PROGRESS NOTE - MSE UNSTRUCTURED FT
On exam today the patient  is irritable and poorly cooperative     She is less irritable with peers     Speech is loud,  but less pressured but no longer screaming when she is approached.    Thought process: with some improvement in her disorder of thought process.    Thought content: with paranoid and Rastafarian delusional beliefs that she is Les and  to Graham   Affect: labile from flat to irritable  Patient refuses to answer questions about suicide, homicide, perceptual disturbances, Mood, or Memory  Patient is Alert and oriented   Insight and judgment are impaired. Impulse control is tenuous at this time

## 2023-11-03 NOTE — BH INPATIENT PSYCHIATRY PROGRESS NOTE - NSBHASSESSSUMMFT_PSY_ALL_CORE
58F,  PPH of schizophrenia, not in current treatment, previously inpatient [place] resident in 2004, denies prior suicide attempt, denies substance use history, denies violence/legal issues, no known PMH, brought in by EMS; activated by daughter for bizarre behavior and worsening psychotic symptoms    11/02 Update  Patient calmer overall but with regression in her cooperation after 1 week of  some clinical improvement  Refused PO court ordered meds and every night had received IM meds as per court order and yesterday completed cross over to Haloperidol DEC   Patient with  some improvement in thought disorder though she remains with grandiose and Taoist delusions  TOO Granted 9/26 and will continue with haloperidol trial as per court order  Received Haloperidol Decanoate 75 mg IM 10/3 as per court order and Haloperidol  mg 10/19, and 11/2  Haloperidol level drawn but quantity of blood was insufficient and was redrawn 10/26 and pending    Impression  Exacerbation of schizophrenia in the context of noncompliance     PLAN  Patient requires acute inpatient care for treatment of psychosis.   Patient admitted on a 939 emergency status 9/13 and converted to 927 on 9/15 for the purpose of TOO  Administrative meeting for TOO 9/20 and for Court Hearing 9/26 which TOO was granted and medication started  Haloperidol 10 mg /diphenhydramine 50 mg /Lorazepam 2 mg as per court order PO offered, IM if refused  10/3 Haloperidol Decanoate JUAN 75 mg and 100 mg IM 10/19  Patient does not require constant observation at this time and will follow with 15 minute checks.  Treatment will include individual therapy/supportive therapy/ rehab therapy/ psychopharmacological therapy and milieu therapy   58F,  PPH of schizophrenia, not in current treatment, previously inpatient [place] resident in 2004, denies prior suicide attempt, denies substance use history, denies violence/legal issues, no known PMH, brought in by EMS; activated by daughter for bizarre behavior and worsening psychotic symptoms    11/03 Update  Patient calmer overall with some improvement in thought disorder though she remains with grandiose and Orthodox delusions  TOO Granted 9/26 and will continue with haloperidol trial as per court order  Received Haloperidol Decanoate 75 mg IM 10/3 as per court order and Haloperidol  mg 10/19, and 11/2  Haloperidol level drawn but quantity of blood was insufficient and was redrawn 10/26 and pending    Impression  Exacerbation of schizophrenia in the context of noncompliance     PLAN  Patient requires acute inpatient care for treatment of psychosis.   Patient admitted on a 939 emergency status 9/13 and converted to 927 on 9/15 for the purpose of TOO  Administrative meeting for TOO 9/20 and for Court Hearing 9/26 which TOO was granted and medication started  Haloperidol 10 mg /diphenhydramine 50 mg /Lorazepam 2 mg as per court order PO offered, IM if refused  10/3 Haloperidol Decanoate JUAN 75 mg and 100 mg IM 10/19, 100mg IM given on 11/2.  Patient does not require constant observation at this time and will follow with 15 minute checks.  Treatment will include individual therapy/supportive therapy/ rehab therapy/ psychopharmacological therapy and milieu therapy

## 2023-11-04 RX ADMIN — Medication 50 MILLIGRAM(S): at 01:27

## 2023-11-05 RX ORDER — BENZOCAINE AND MENTHOL 5; 1 G/100ML; G/100ML
1 LIQUID ORAL EVERY 4 HOURS
Refills: 0 | Status: DISCONTINUED | OUTPATIENT
Start: 2023-11-05 | End: 2023-11-20

## 2023-11-05 RX ORDER — SODIUM CHLORIDE 0.65 %
1 AEROSOL, SPRAY (ML) NASAL
Refills: 0 | Status: DISCONTINUED | OUTPATIENT
Start: 2023-11-05 | End: 2023-11-20

## 2023-11-05 RX ADMIN — BENZOCAINE AND MENTHOL 1 LOZENGE: 5; 1 LIQUID ORAL at 08:49

## 2023-11-05 RX ADMIN — BENZOCAINE AND MENTHOL 1 LOZENGE: 5; 1 LIQUID ORAL at 21:27

## 2023-11-05 RX ADMIN — Medication 1 SPRAY(S): at 13:10

## 2023-11-05 RX ADMIN — BENZOCAINE AND MENTHOL 1 LOZENGE: 5; 1 LIQUID ORAL at 13:11

## 2023-11-05 RX ADMIN — Medication 1 SPRAY(S): at 21:26

## 2023-11-06 PROCEDURE — 99232 SBSQ HOSP IP/OBS MODERATE 35: CPT

## 2023-11-06 RX ADMIN — Medication 1 SPRAY(S): at 06:31

## 2023-11-06 RX ADMIN — BENZOCAINE AND MENTHOL 1 LOZENGE: 5; 1 LIQUID ORAL at 06:31

## 2023-11-06 NOTE — BH INPATIENT PSYCHIATRY PROGRESS NOTE - NSBHMETABOLIC_PSY_ALL_CORE_FT
BMI:   HbA1c:   Glucose:   BP: 144/86 (11-06-23 @ 06:41) (131/74 - 144/86)  Lipid Panel: Date/Time: 09-14-23 @ 09:30  Cholesterol, Serum: 229  Direct LDL: --  HDL Cholesterol, Serum: 62  Total Cholesterol/HDL Ration Measurement: --  Triglycerides, Serum: 96

## 2023-11-06 NOTE — BH INPATIENT PSYCHIATRY PROGRESS NOTE - PRN MEDS
MEDICATIONS  (PRN):  benzocaine/menthol Lozenge 1 Lozenge Oral every 4 hours PRN sore throat  diphenhydrAMINE 50 milliGRAM(s) Oral every 4 hours PRN agitation/EPS  diphenhydrAMINE Injectable 50 milliGRAM(s) IntraMuscular at bedtime PRN EPS Prohylaxis  haloperidol     Tablet 5 milliGRAM(s) Oral every 4 hours PRN agitation  haloperidol    Injectable 5 milliGRAM(s) IntraMuscular Once PRN agitation  sodium chloride 0.65% Nasal 1 Spray(s) Both Nostrils every 2 hours PRN Nasal Congestion

## 2023-11-06 NOTE — BH INPATIENT PSYCHIATRY PROGRESS NOTE - CURRENT MEDICATION
MEDICATIONS  (STANDING):    MEDICATIONS  (PRN):  benzocaine/menthol Lozenge 1 Lozenge Oral every 4 hours PRN sore throat  diphenhydrAMINE 50 milliGRAM(s) Oral every 4 hours PRN agitation/EPS  diphenhydrAMINE Injectable 50 milliGRAM(s) IntraMuscular at bedtime PRN EPS Prohylaxis  haloperidol     Tablet 5 milliGRAM(s) Oral every 4 hours PRN agitation  haloperidol    Injectable 5 milliGRAM(s) IntraMuscular Once PRN agitation  sodium chloride 0.65% Nasal 1 Spray(s) Both Nostrils every 2 hours PRN Nasal Congestion

## 2023-11-06 NOTE — BH INPATIENT PSYCHIATRY PROGRESS NOTE - NSBHASSESSSUMMFT_PSY_ALL_CORE
58F,  PPH of schizophrenia, not in current treatment, previously inpatient [place] resident in 2004, denies prior suicide attempt, denies substance use history, denies violence/legal issues, no known PMH, brought in by EMS; activated by daughter for bizarre behavior and worsening psychotic symptoms    11/02 Update  Patient calmer overall but with regression in her cooperation after 1 week of  some clinical improvement  Refused PO court ordered meds and every night had received IM meds as per court order and yesterday completed cross over to Haloperidol DEC   Patient with  some improvement in thought disorder though she remains with grandiose and Adventism delusions  TOO Granted 9/26 and will continue with haloperidol trial as per court order  Received Haloperidol Decanoate 75 mg IM 10/3 as per court order and Haloperidol  mg 10/19, and 11/2  Haloperidol level drawn but quantity of blood was insufficient and was redrawn 10/26 and pending  11/6: suspected delusions, cooperative, denied SI/I/P.     Impression  Exacerbation of schizophrenia in the context of noncompliance     PLAN  Patient requires acute inpatient care for treatment of psychosis.   Patient admitted on a 939 emergency status 9/13 and converted to 927 on 9/15 for the purpose of TOO  Administrative meeting for TOO 9/20 and for Court Hearing 9/26 which TOO was granted and medication started  Haloperidol 10 mg /diphenhydramine 50 mg /Lorazepam 2 mg as per court order PO offered, IM if refused  10/3 Haloperidol Decanoate JUAN 75 mg and 100 mg IM 10/19  Patient does not require constant observation at this time and will follow with 15 minute checks.  Treatment will include individual therapy/supportive therapy/ rehab therapy/ psychopharmacological therapy and milieu therapy

## 2023-11-06 NOTE — BH INPATIENT PSYCHIATRY PROGRESS NOTE - NSBHFUPINTERVALHXFT_PSY_A_CORE
f/up schizophrenia, care discussed w/ tx team, VSS. no overnight issues. Patient reported that she was admitted as she needed "attention". Patient was pleasant on approach, cooperative. +fine tremors-upper extremities. no td, stiffness observed, reported. Reported that she was waiting to be discharged to outpatient [facility]. Reported sleeping well and with good appetite.

## 2023-11-06 NOTE — BH INPATIENT PSYCHIATRY PROGRESS NOTE - NSBHCHARTREVIEWVS_PSY_A_CORE FT
Vital Signs Last 24 Hrs  T(C): 36.8 (11-06-23 @ 06:41), Max: 36.8 (11-06-23 @ 06:41)  T(F): 98.3 (11-06-23 @ 06:41), Max: 98.3 (11-06-23 @ 06:41)  HR: --  BP: 144/86 (11-06-23 @ 06:41) (144/86 - 144/86)  BP(mean): 96 (11-06-23 @ 06:41) (96 - 96)  RR: --  SpO2: --

## 2023-11-06 NOTE — BH INPATIENT PSYCHIATRY PROGRESS NOTE - MSE UNSTRUCTURED FT
On exam today the patient  is cooperative     She is less irritable with peers     Speech is regular rate, normal volume   Thought process: concrete  Thought content: +suspected delusions   Affect: labile from flat to irritable  Patient denied SI/HI/AH/VH;   Patient is Alert and oriented x3, not oriented to situation  Insight and judgment are poor. Impulse control is fair at this time

## 2023-11-07 PROCEDURE — 99231 SBSQ HOSP IP/OBS SF/LOW 25: CPT

## 2023-11-07 RX ADMIN — BENZOCAINE AND MENTHOL 1 LOZENGE: 5; 1 LIQUID ORAL at 10:56

## 2023-11-07 NOTE — BH INPATIENT PSYCHIATRY PROGRESS NOTE - NSBHCHARTREVIEWVS_PSY_A_CORE FT
Vital Signs Last 24 Hrs  T(C): 36.5 (11-07-23 @ 06:43), Max: 36.5 (11-07-23 @ 06:43)  T(F): 97.7 (11-07-23 @ 06:43), Max: 97.7 (11-07-23 @ 06:43)  HR: 91 (11-07-23 @ 06:43) (91 - 91)  BP: 140/91 (11-07-23 @ 06:43) (140/91 - 140/91)  BP(mean): --  RR: --  SpO2: --

## 2023-11-07 NOTE — BH INPATIENT PSYCHIATRY PROGRESS NOTE - NSBHASSESSSUMMFT_PSY_ALL_CORE
58F,  PPH of schizophrenia, not in current treatment, previously inpatient [place] resident in 2004, denies prior suicide attempt, denies substance use history, denies violence/legal issues, no known PMH, brought in by EMS; activated by daughter for bizarre behavior and worsening psychotic symptoms    11/07 Update  Patient calmer overall and willing to discuss discharge plans   Patient with some improvement in thought disorder though she remains with grandiose and Sikhism delusions  TOO Granted 9/26 and will continue with haloperidol trial as per court order  Received Haloperidol Decanoate 75 mg IM 10/3 as per court order and Haloperidol  mg 10/19, and 11/2  Haloperidol level drawn but quantity of blood was insufficient and was redrawn 10/26 and is subtherapeutic at 2 mg and will increase next JUAN 11/16    Impression  Exacerbation of schizophrenia in the context of noncompliance     PLAN  Patient requires acute inpatient care for treatment of psychosis.   Patient admitted on a 939 emergency status 9/13 and converted to 927 on 9/15 for the purpose of TOO  Administrative meeting for TOO 9/20 and for Court Hearing 9/26 which TOO was granted and medication started  Haloperidol 10 mg /diphenhydramine 50 mg /Lorazepam 2 mg as per court order PO offered, IM if refused  10/3 Haloperidol Decanoate JUAN 75 mg and 100 mg IM 10/19 and plan for 150 mg 11/16  Patient does not require constant observation at this time and will follow with 15 minute checks.  Treatment will include individual therapy/supportive therapy/ rehab therapy/ psychopharmacological therapy and milieu therapy

## 2023-11-07 NOTE — BH INPATIENT PSYCHIATRY PROGRESS NOTE - NSBHFUPINTERVALHXFT_PSY_A_CORE
Patient is seen and evaluated for follow up of psychosis.    Chart reviewed and case discussed with treatment team.  Patient more cooperative and asking about discharge   Remains delusional but more organized  Admits to prior treatment and willing to get JUAN as outpatient

## 2023-11-07 NOTE — BH INPATIENT PSYCHIATRY PROGRESS NOTE - NSBHMETABOLIC_PSY_ALL_CORE_FT
BMI:   HbA1c:   Glucose:   BP: 140/91 (11-07-23 @ 06:43) (131/74 - 144/86)  Lipid Panel: Date/Time: 09-14-23 @ 09:30  Cholesterol, Serum: 229  Direct LDL: --  HDL Cholesterol, Serum: 62  Total Cholesterol/HDL Ration Measurement: --  Triglycerides, Serum: 96

## 2023-11-07 NOTE — BH INPATIENT PSYCHIATRY PROGRESS NOTE - MSE UNSTRUCTURED FT
On exam today the patient  is more cooperative     She is less irritable with peers     Speech is regular rate, normal volume   Thought process: concrete  Thought content:  multiple bizarre Buddhism delusions of being "Les" and  to "Graham"   Affect: labile from flat to irritable  Patient denied SI/HI/AH/VH;   Patient is Alert and oriented x3, not oriented to situation  Insight and judgment are poor. Impulse control is fair at this time

## 2023-11-08 PROCEDURE — 99231 SBSQ HOSP IP/OBS SF/LOW 25: CPT

## 2023-11-08 NOTE — BH INPATIENT PSYCHIATRY PROGRESS NOTE - MSE UNSTRUCTURED FT
On exam today the patient  is more cooperative     She is less irritable with peers     Speech is regular rate, normal volume   Thought process: concrete  Thought content:  multiple bizarre Gnosticism delusions of being "Les" and  to "Graham"   Affect: labile from flat to irritable  Patient denied SI/HI/AH/VH;   Patient is Alert and oriented x3, not oriented to situation  Insight and judgment are poor. Impulse control is fair at this time

## 2023-11-08 NOTE — CHART NOTE - NSCHARTNOTEFT_GEN_A_CORE
Nutrition follow up assessment for length of stay. Patient is a 57 y/o female with PPH of schizophrenia, not in current treatment, previously inpatient [place] resident in 2004, denies prior suicide attempt, denies substance use history, denies violence/legal issues, no known PMH, brought in by EMS; activated by daughter for bizarre behavior and worsening psychotic symptoms.    Met with patient in her room today who was lying in the bed. Patient reports appetite remains good with good PO intake, states "I have no problem with eating." Denies chewing/swallowing difficulties on current diet. Menu options reviewed with patient today, new food preferences taken and honored on CBoard -- patient now wants grilled chicken salad @ lunch and sandwiches @ dinner. Patient denies GI distress (nausea/vomiting/diarrhea/ constipation) at this time, reports + BM daily. Patient did not exhibit interested in diet education for her blood lipids management; writer briefly encouraged fruits and vegetable intake. Otherwise, patient has no questions about her diet at this time.     Diet : Diet, DASH/TLC:   Sodium & Cholesterol Restricted (09-12-23 @ 19:53) [Active]    Patient reports [ ] nausea  [ ] vomiting [ ] diarrhea [ ] constipation  [ ] chewing problems [ ] swallowing issues  [X] other: Pt denies GI distress at this time    PO intake:  [ ] < 50%  [ ] 50-75%  [X] %  [ ] other :      Height: 165.1cm  Weight: 206lb (9/16), 213lb (11/4)  -- overall gained 7lb/3.4% in ~2 months  BMI: 35.4 (11/4)    Skin: intact    Edema: no edema    Pertinent Medications: MEDICATIONS  (STANDING):    MEDICATIONS  (PRN):  benzocaine/menthol Lozenge 1 Lozenge Oral every 4 hours PRN sore throat  diphenhydrAMINE 50 milliGRAM(s) Oral every 4 hours PRN agitation/EPS  diphenhydrAMINE Injectable 50 milliGRAM(s) IntraMuscular at bedtime PRN EPS Prohylaxis  haloperidol     Tablet 5 milliGRAM(s) Oral every 4 hours PRN agitation  haloperidol    Injectable 5 milliGRAM(s) IntraMuscular Once PRN agitation  sodium chloride 0.65% Nasal 1 Spray(s) Both Nostrils every 2 hours PRN Nasal Congestion    Pertinent Labs:  10/10/23 CMP reviewed, unremarkable.    9/14 Cholesterol 229,         Estimated Needs:   [X] no change since previous assessment  [ ] recalculated:       Previous Nutrition Diagnosis: Overweight/Obesity    Nutrition Diagnosis is [X] ongoing  [ ] resolved [ ] not applicable      New Nutrition Diagnosis: [X] not applicable        Recommendations:  1. Continue current diet order, which remains appropriate at this time.   2. Encourage healthy food choices and portion control.   3. Monitor PO intake/tolerance, weights, labs, BM's, and skin integrity.     -- Cynthia Burks, MS, RDN, CDN, Pager # [phone]

## 2023-11-08 NOTE — BH INPATIENT PSYCHIATRY PROGRESS NOTE - NSBHMETABOLIC_PSY_ALL_CORE_FT
BMI:   HbA1c:   Glucose:   BP: 154/86 (11-08-23 @ 07:19) (140/91 - 154/86)  Lipid Panel: Date/Time: 09-14-23 @ 09:30  Cholesterol, Serum: 229  Direct LDL: --  HDL Cholesterol, Serum: 62  Total Cholesterol/HDL Ration Measurement: --  Triglycerides, Serum: 96

## 2023-11-08 NOTE — BH INPATIENT PSYCHIATRY PROGRESS NOTE - NSBHCHARTREVIEWVS_PSY_A_CORE FT
Vital Signs Last 24 Hrs  T(C): 36.6 (11-08-23 @ 07:19), Max: 36.6 (11-08-23 @ 07:19)  T(F): 97.8 (11-08-23 @ 07:19), Max: 97.8 (11-08-23 @ 07:19)  HR: 78 (11-08-23 @ 07:19) (78 - 78)  BP: 154/86 (11-08-23 @ 07:19) (154/86 - 154/86)  BP(mean): --  RR: --  SpO2: --

## 2023-11-08 NOTE — BH SCALES AND SCREENS - NSBPRSNOTDONE_PSY_ALL_CORE
Patient unable to cooperate; Cannot evaluate

## 2023-11-08 NOTE — BH INPATIENT PSYCHIATRY PROGRESS NOTE - NSBHASSESSSUMMFT_PSY_ALL_CORE
58F,  PPH of schizophrenia, not in current treatment, previously inpatient [place] resident in 2004, denies prior suicide attempt, denies substance use history, denies violence/legal issues, no known PMH, brought in by EMS; activated by daughter for bizarre behavior and worsening psychotic symptoms    11/08 Update  Patient calmer overall and willing to discuss past treatment and discharge plans   Patient with some improvement in thought disorder though she remains with grandiose and Tenriism delusions  TOO Granted 9/26 and will continue with haloperidol trial as per court order  Received Haloperidol Decanoate 75 mg IM 10/3 as per court order and Haloperidol  mg 10/19, and 11/2  Haloperidol level drawn but quantity of blood was insufficient and was redrawn 10/26 and is subtherapeutic at 2 mg and will increase next JUAN 11/16 11/8 Admits to prior treatment and verifies that she was on haloperidol 200 mg which was lowered to 125 mg due to tremor    Impression  Exacerbation of schizophrenia in the context of noncompliance     PLAN  Patient requires acute inpatient care for treatment of psychosis.   Patient admitted on a 939 emergency status 9/13 and converted to 927 on 9/15 for the purpose of TOO  Administrative meeting for TOO 9/20 and for Court Hearing 9/26 which TOO was granted and medication started  Haloperidol 10 mg /diphenhydramine 50 mg /Lorazepam 2 mg as per court order PO offered, IM if refused  10/3 Haloperidol Decanoate JUAN 75 mg and 100 mg IM 10/19 and plan for 150 mg 11/16  Patient does not require constant observation at this time and will follow with 15 minute checks.  Treatment will include individual therapy/supportive therapy/ rehab therapy/ psychopharmacological therapy and milieu therapy

## 2023-11-08 NOTE — BH INPATIENT PSYCHIATRY PROGRESS NOTE - NSBHFUPINTERVALHXFT_PSY_A_CORE
Patient is seen and evaluated for follow up of psychosis.    Chart reviewed and case discussed with treatment team.  Patient more cooperative and asking about discharge   Remains delusional but more organized  Admits to prior treatment and verifies that she was on haloperidol 200 mg which was lowered to 125 mg due to tremor and willing to get JUAN as outpatient

## 2023-11-09 PROCEDURE — 99231 SBSQ HOSP IP/OBS SF/LOW 25: CPT

## 2023-11-09 NOTE — BH INPATIENT PSYCHIATRY PROGRESS NOTE - NSBHCHARTREVIEWVS_PSY_A_CORE FT
Vital Signs Last 24 Hrs  T(C): 36.9 (11-09-23 @ 06:47), Max: 36.9 (11-09-23 @ 06:47)  T(F): 98.5 (11-09-23 @ 06:47), Max: 98.5 (11-09-23 @ 06:47)  HR: 97 (11-09-23 @ 06:47) (97 - 97)  BP: 135/79 (11-09-23 @ 06:47) (135/79 - 135/79)  BP(mean): --  RR: --  SpO2: --

## 2023-11-09 NOTE — BH INPATIENT PSYCHIATRY PROGRESS NOTE - MSE UNSTRUCTURED FT
On exam today the patient  is more cooperative     She is less irritable with peers     Speech is regular rate, normal volume   Thought process: concrete  Thought content:  multiple bizarre Amish delusions of being "Les" and  to "Graham"   Affect: labile from flat to irritable  Patient denied SI/HI/AH/VH;   Patient is Alert and oriented x3, not oriented to situation  Insight and judgment are poor. Impulse control is fair at this time

## 2023-11-09 NOTE — BH INPATIENT PSYCHIATRY PROGRESS NOTE - NSBHMETABOLIC_PSY_ALL_CORE_FT
BMI:   HbA1c:   Glucose:   BP: 135/79 (11-09-23 @ 06:47) (135/79 - 154/86)  Lipid Panel: Date/Time: 09-14-23 @ 09:30  Cholesterol, Serum: 229  Direct LDL: --  HDL Cholesterol, Serum: 62  Total Cholesterol/HDL Ration Measurement: --  Triglycerides, Serum: 96

## 2023-11-09 NOTE — BH INPATIENT PSYCHIATRY PROGRESS NOTE - NSBHASSESSSUMMFT_PSY_ALL_CORE
58F,  PPH of schizophrenia, not in current treatment, previously inpatient [place] resident in 2004, denies prior suicide attempt, denies substance use history, denies violence/legal issues, no known PMH, brought in by EMS; activated by daughter for bizarre behavior and worsening psychotic symptoms    11/09 Update  Patient calmer overall and willing to discuss past treatment and discharge plans   Patient with some improvement in thought disorder though she remains with grandiose and Yazdanism delusions  TOO Granted 9/26 and will continue with haloperidol trial as per court order  Received Haloperidol Decanoate 75 mg IM 10/3 as per court order and Haloperidol  mg 10/19, and 11/2  Haloperidol level drawn but quantity of blood was insufficient and was redrawn 10/26 and is subtherapeutic at 2 mg and will increase next JUAN 11/16 to 125 mg  11/8 Admits to prior treatment and verifies that she was on haloperidol 200 mg which was lowered to 125 mg due to tremor    Impression  Exacerbation of schizophrenia in the context of noncompliance     PLAN  Patient requires acute inpatient care for treatment of psychosis.   Patient admitted on a 939 emergency status 9/13 and converted to 927 on 9/15 for the purpose of TOO  Administrative meeting for TOO 9/20 and for Court Hearing 9/26 which TOO was granted and medication started  Haloperidol 10 mg /diphenhydramine 50 mg /Lorazepam 2 mg as per court order PO offered, IM if refused  10/3 Haloperidol Decanoate JUAN 75 mg and 100 mg IM 10/19 and plan for 150 mg 11/16  Patient does not require constant observation at this time and will follow with 15 minute checks.  Treatment will include individual therapy/supportive therapy/ rehab therapy/ psychopharmacological therapy and milieu therapy   Subjective:   Follow-up      Chief Complaint:   Chief Complaint   Patient presents with   • Follow-up       HPI:    Dorene Hernandez is a 54 y.o. female here for follow-up of antelmo.  Patient was last seen 2018.  Patient is on auto bilevel and doing very well.  On original study patient did have obstructive sleep apnea as well as nocturnal hypoxemia.  On 2018 patient did have a titration study that showed oxygen was corrected with BiPAP therapy.  Patient is sleeping 6-7 hours nightly and does feel very rested upon awakening.  Patient does not nap.  Patient does not snore.  Apgar scores 9/24.  Patient has no complaints or concerns today and wishes to continue BiPAP therapy.        Current medications are:   Current Outpatient Medications:   •  acetaminophen (TYLENOL) 325 MG tablet, Take 2 tablets by mouth Every 4 (Four) Hours As Needed for Mild Pain ., Disp: , Rfl:   •  albuterol (PROVENTIL HFA;VENTOLIN HFA) 108 (90 BASE) MCG/ACT inhaler, Inhale 2 puffs Every 6 (Six) Hours As Needed for Wheezing., Disp: 1 inhaler, Rfl: 0  •  ascorbic acid (VITAMIN C) 100 MG tablet, Take 100 mg by mouth Daily., Disp: , Rfl:   •  calcium citrate-vitamin d (CITRACAL) 200-250 MG-UNIT tablet tablet, Take 1 tablet by mouth Daily., Disp: , Rfl:   •  coenzyme Q10 100 MG capsule, Take 100 mg by mouth Daily., Disp: , Rfl:   •  lisinopril (PRINIVIL,ZESTRIL) 20 MG tablet, Take 20 mg by mouth Daily., Disp: , Rfl:   •  loratadine (CLARITIN) 10 MG tablet, Take 10 mg by mouth Daily., Disp: , Rfl:   •  metFORMIN (GLUCOPHAGE) 500 MG tablet, Take 500 mg by mouth 2 (Two) Times a Day With Meals., Disp: , Rfl:   •  Multiple Vitamins-Minerals (MULTIVITAMIN WITH MINERALS) tablet tablet, Take 1 tablet by mouth Daily., Disp: , Rfl:   •  Omega-3 Fatty Acids (FISH OIL) 1000 MG capsule capsule, Take 1,000 mg by mouth Daily With Breakfast., Disp: , Rfl:   •  pantoprazole (PROTONIX) 40 MG EC tablet, Take 40 mg by mouth 2 (Two) Times a Day., Disp: , Rfl:   •   rosuvastatin (CRESTOR) 20 MG tablet, Take 20 mg by mouth Daily., Disp: , Rfl:   •  ondansetron (ZOFRAN) 4 MG tablet, Take 1 tablet by mouth Every 6 (Six) Hours As Needed for Nausea or Vomiting., Disp: 10 tablet, Rfl: 0  •  ondansetron (ZOFRAN) 4 MG tablet, Take 1 tablet by mouth Every 6 (Six) Hours As Needed for Nausea or Vomiting., Disp: 20 tablet, Rfl: 0  •  oxyCODONE-acetaminophen (PERCOCET) 5-325 MG per tablet, Take 2 tablets by mouth Every 6 (Six) Hours As Needed for Moderate Pain  or Severe Pain ., Disp: 15 tablet, Rfl: 0  •  prochlorperazine (COMPAZINE) 5 MG tablet, Take 1 tablet by mouth Every 6 (Six) Hours As Needed for Nausea or Vomiting., Disp: 10 tablet, Rfl: 0  •  tamsulosin (FLOMAX) 0.4 MG capsule 24 hr capsule, Take 1 capsule by mouth Daily., Disp: 30 capsule, Rfl: 0.      The patient's relevant past medical, surgical, family and social history were reviewed and updated in Epic as appropriate.       Review of Systems   Constitutional: Positive for diaphoresis.   HENT: Positive for congestion.    Respiratory: Positive for apnea and cough.    Endocrine: Positive for polydipsia.   Musculoskeletal: Positive for back pain.   Allergic/Immunologic: Positive for environmental allergies.   Psychiatric/Behavioral: Positive for sleep disturbance.   All other systems reviewed and are negative.        Objective:    Physical Exam   Constitutional: She is oriented to person, place, and time. She appears well-developed and well-nourished.   HENT:   Head: Normocephalic and atraumatic.   Mouth/Throat: Oropharynx is clear and moist.   Mallampati 3 anatomy   Eyes: Conjunctivae are normal.   Neck: Neck supple. No thyromegaly present.   Cardiovascular: Normal rate and regular rhythm.   Pulmonary/Chest: Effort normal and breath sounds normal.   Lymphadenopathy:     She has no cervical adenopathy.   Neurological: She is alert and oriented to person, place, and time.   Skin: Skin is warm and dry.   Psychiatric: She has a  normal mood and affect. Her behavior is normal. Judgment and thought content normal.   Nursing note and vitals reviewed.    180/180 days of use.  AHI is 1.3.  90% IPAP 21.8 cm H2O.  90% EPAP 14.7 cm H2O.  Greater than 4-hour use 100%.  Download has been reviewed with patient.    ASSESSMENT/PLAN    Dorene was seen today for follow-up.    Diagnoses and all orders for this visit:    BIANKA (obstructive sleep apnea)  -     CPAP Therapy            1. Counseled patient regarding multimodal approach with healthy nutrition, healthy sleep, regular physical activity, social activities, counseling, and medications. Encouraged to practice lateral sleep position. Avoid alcohol and sedatives close to bedtime.  2.   Refill supplies times 1 year.  Return to clinic 1 year or sooner as symptoms warrant.  I have reviewed the results of my evaluation and impression and discussed my recommendations in detail with the patient.      Signed by  MAYELIN Mensah    March 19, 2019      CC: Shelia Guardado MD          No ref. provider found

## 2023-11-09 NOTE — BH INPATIENT PSYCHIATRY PROGRESS NOTE - NSBHFUPINTERVALHXFT_PSY_A_CORE
Patient is seen and evaluated for follow up of psychosis.    Chart reviewed and case discussed with treatment team.  Patient more cooperative and asking about discharge   With mild tremor but refusing benztropine   Remains delusional but more organized

## 2023-11-10 PROCEDURE — 99231 SBSQ HOSP IP/OBS SF/LOW 25: CPT

## 2023-11-10 NOTE — BH INPATIENT PSYCHIATRY PROGRESS NOTE - MSE UNSTRUCTURED FT
On exam today the patient  is condescending but  more cooperative     She is less irritable with peers     Speech is regular rate, normal volume   Thought process: concrete  Thought content:  multiple bizarre Scientologist delusions of being "Les" and  to "Graham"   Affect: labile from flat to irritable  Patient denied SI/HI/AH/VH;   Patient is Alert and oriented x3, not oriented to situation  Insight and judgment are poor. Impulse control is fair at this time

## 2023-11-10 NOTE — BH INPATIENT PSYCHIATRY PROGRESS NOTE - NSBHASSESSSUMMFT_PSY_ALL_CORE
58F,  PPH of schizophrenia, not in current treatment, previously inpatient [place] resident in 2004, denies prior suicide attempt, denies substance use history, denies violence/legal issues, no known PMH, brought in by EMS; activated by daughter for bizarre behavior and worsening psychotic symptoms    11/10 Update  Patient calmer overall and willing to discuss past treatment and discharge plans   Patient with some improvement in thought disorder though she remains with grandiose and Roman Catholic delusions  TOO Granted 9/26 and will continue with haloperidol trial as per court order  Received Haloperidol Decanoate 75 mg IM 10/3 as per court order and Haloperidol  mg 10/19, and 11/2  Haloperidol level drawn but quantity of blood was insufficient and was redrawn 10/26 and is subtherapeutic at 2 mg and will increase next JUAN 11/16 to 125 mg  11/8 Admits to prior treatment and verifies that she was on haloperidol 200 mg which was lowered to 125 mg due to tremor    Impression  Exacerbation of schizophrenia in the context of noncompliance     PLAN  Patient requires acute inpatient care for treatment of psychosis.   Patient admitted on a 939 emergency status 9/13 and converted to 927 on 9/15 for the purpose of TOO  Administrative meeting for TOO 9/20 and for Court Hearing 9/26 which TOO was granted and medication started  Haloperidol 10 mg /diphenhydramine 50 mg /Lorazepam 2 mg as per court order PO offered, IM if refused  10/3 Haloperidol Decanoate JUAN 75 mg and 100 mg IM 10/19 and plan for 150 mg 11/16  Patient does not require constant observation at this time and will follow with 15 minute checks.  Treatment will include individual therapy/supportive therapy/ rehab therapy/ psychopharmacological therapy and milieu therapy

## 2023-11-10 NOTE — BH INPATIENT PSYCHIATRY PROGRESS NOTE - NSBHFUPINTERVALHXFT_PSY_A_CORE
Patient is seen and evaluated for follow up of psychosis.    Chart reviewed and case discussed with treatment team.  Patient more cooperative and asking about discharge   With mild tremor but refusing benztropine or nay PO medication will only take JUAN   Remains delusional but more organized

## 2023-11-12 NOTE — BH INPATIENT PSYCHIATRY DISCHARGE NOTE - NSBHASSESSSUMMFT_PSY_ALL_CORE
58F,  PPH of schizophrenia, not in current treatment, previously inpatient [place] resident in 2004, denies prior suicide attempt, denies substance use history, denies violence/legal issues, no known PMH, brought in by EMS; activated by daughter for bizarre behavior and worsening psychotic symptoms    11/20 Update  Patient calmer overall and willing to discuss past treatment and discharge plans   Suicide and risk assessment performed prior to discharge. The patient has a low acute risk and low chronic risk of self-harm and aggression towards others. Protective factors include denying SI, no SIB, denying HI, good social supports in their family, no substance abuse, no current mood symptoms, no hopelessness, future-oriented in returning to home, no access to firearms.  Risk factors include presenting illness. Immediate risk was minimized by inpatient admission to a safe environment with appropriate supervision and limited access to lethal means. Future risk was minimized before discharge by treatment of acute episode, maximizing outpatient support, providing relevant patient education, discussing emergency procedures, and ensuring close follow-up. The patient remains at a low risk of self-harm, and such risk cannot be further ameliorated by continued inpatient treatment and the patient is therefore appropriate for discharge.      Discharge Progress Note Date and Time: 11-20-23 @ 08:16

## 2023-11-12 NOTE — BH INPATIENT PSYCHIATRY DISCHARGE NOTE - MSE UNSTRUCTURED FT
On exam today the patient is overall more cooperative     She is less irritable with peers     Speech is regular rate, normal volume   Thought process: concrete  Thought content:   still with Jehovah's witness delusions of being "Les" and  to "Graham"   Affect: more stable, constricted  Patient denied SI/HI/AH/VH;   Patient is Alert and oriented x3, not oriented to situation  Insight and judgment are poor. Impulse control is fair at this time

## 2023-11-12 NOTE — BH INPATIENT PSYCHIATRY DISCHARGE NOTE - NSDCCPCAREPLAN_GEN_ALL_CORE_FT
PRINCIPAL DISCHARGE DIAGNOSIS  Diagnosis: Schizophrenia, unspecified  Assessment and Plan of Treatment:

## 2023-11-12 NOTE — BH INPATIENT PSYCHIATRY DISCHARGE NOTE - HOSPITAL COURSE
Patient admitted to [address] [place] [address] from 9/13/23-11/20/23    The patient was admitted on September 13, 2023 after her family called 911 when they went to visit her in her apartment. She has a long-standing history of chronic schizophrenia and a long history of being treated with long acting injectables, but have been nonadherent with her treatment since 2020 from the day of admission, the patient was absolutely adamant that refusing to talk to or engage with anyone on the treatment team or any visitors any other patients and for the first three weeks refuse to leave her room    Review of psyches revealed that patient had previously been treated with the JUAN Haldol, but patient refused to endorse whether this was true    As she refused to talk to the treatment team, and would only say scream to get out of her room, and would say talk to Les talk to God, or I am Les  The team began efforts to apply to court for treatment over her objection.    Court hearing for treatment over objection was held on September 26 which patient refused to participate in or attend treatment over objection was granted, and the patient began a course of haloperidol    Despite the court hearings, and the need for I am medication every night. The patient continue to refuse any medication by mouth , for three weeks she was injected nightly with haloperidol 10 mg diphenhydramine 50 mg and lorazepam 2 mg    On this regimen. The patient began to at least engage with the treatment team. Does she remain disorganized and delusional and as she was displaying a partial response to haloperidol daily injections of short acting Haldol were transition to  haloperidol JUAN injections at 125 mg per month     First JUAN injection was given to the patient on October 3 of 50 mg with 100 mg given on October 19 another hundred milligrams given on November 2 and 125 mg given on November 16   NEXT HALOPERIDOL JUAN 125 mg DUE 12/14/2023    On the long acting injectable haloperidol, the patient began to minimally talk to the treatment team. She remain delusional that she was Les and  to Graham, but she did say that she was treated for many years and haloperidol 200 mg I am monthly and on this dose she had a tremor that was lowered to 125 mg. She was adamant that she always refuses medicine for her side effects and her tremor by mouth and that she would continue to do so following discharge, as a result, it was felt that the best course was to treat patient with this 125 mg of Haloperidol monthly     Patient has improved to the point that she could be returned to her home as an outpatient with this level of psychosis as though her delusions persisted they were less prominent and no longer were effecting her ability to manage her ADLS or her thought organization          Patient admitted to [address] [place] [address] from 9/13/23-11/20/23    The patient was admitted on September 13, 2023 after her family called 911 when they went to visit her in her apartment. She has a long-standing history of chronic schizophrenia and a long history of being treated with long acting injectables, but have been nonadherent with her treatment since 2020 from the day of admission, the patient was absolutely adamant that refusing to talk to or engage with anyone on the treatment team or any visitors any other patients and for the first three weeks refuse to leave her room    Review of psyches revealed that patient had previously been treated with the JUAN Haldol, but patient refused to endorse whether this was true    As she refused to talk to the treatment team, and would only say scream to get out of her room, and would say talk to Les talk to God, or I am Les  The team began efforts to apply to court for treatment over her objection.    Court hearing for treatment over objection was held on September 26, 2023 which patient refused to participate in or attend treatment over objection was granted, and the patient began a course of haloperidol    Despite the court hearings, and the need for IM medication every night. The patient continued to refuse any medication by mouth , for three weeks she was injected nightly with haloperidol 10 mg diphenhydramine 50 mg and lorazepam 2 mg.    On this regimen. The patient began to at least engage with the treatment team. As she remained disorganized and delusional and as she was displaying a partial response to haloperidol daily injections of short acting Haldol were transitioned to  haloperidol JUAN injections starting at 50  mg and titrated to 125 mg per month     First JUAN injection was given to the patient on October 3 of 50 mg with 100 mg given on October 19, another hundred milligrams given on November 2 and 125 mg given on November 16    NEXT HALOPERIDOL JUAN 125 mg DUE 12/14/2023    On the long acting injectable haloperidol, the patient began to minimally talk to the treatment team. She remain delusional that she was Les and  to Graham, but she did say that she was treated for many years and haloperidol 200 mg I am monthly and on this dose she had a tremor that was lowered to 125 mg. She was adamant that she always refuses medicine for her side effects and her tremor by mouth and that she would continue to do so following discharge, as a result, it was felt that the best course was to treat patient with this 125 mg of Haloperidol monthly       Suicide and risk assessment performed prior to discharge. The patient has a low acute risk and low chronic risk of self-harm and aggression towards others. Protective factors include denying SI, no SIB, denying HI, good social supports in their family, no substance abuse, no current mood symptoms, no hopelessness, future-oriented in returning to home, no access to firearms.  Risk factors include presenting illness. Immediate risk was minimized by inpatient admission to a safe environment with appropriate supervision and limited access to lethal means. Future risk was minimized before discharge by treatment of acute episode, maximizing outpatient support, providing relevant patient education, discussing emergency procedures, and ensuring close follow-up. The patient remains at a low risk of self-harm, and such risk cannot be further ameliorated by continued inpatient treatment and the patient is therefore appropriate for discharge.       A full discussion of the factors that predict treatment success and relapse was held including safety planning.  A discussion of the risks and benefits of patient’s medication was held including a discussion of the metabolic risks and risk of EPS and TD was done and AIMS Scale was performed and Score =0  On the day of discharge, the patient no longer requires inpatient treatment and care. Patient denies all suicidal and aggressive ideation, intent and plan. Patient denies anxiety symptoms and panic attacks.  Patient has improved to the point that she could be returned to her home as an outpatient with this level of psychosis as though her delusions persisted they were less prominent and no longer were effecting her ability to manage her ADLS or her thought organization

## 2023-11-12 NOTE — BH INPATIENT PSYCHIATRY DISCHARGE NOTE - NSBHDCSIGEVENTSFT_PSY_A_CORE
None   Though patient refused PO meds throughout her stay even after the Court ordered and only took IM meds

## 2023-11-12 NOTE — BH INPATIENT PSYCHIATRY DISCHARGE NOTE - NSBHFUPINTERVALHXFT_PSY_A_CORE
Patient is seen and evaluated for follow up of psychosis and discharge planning  30 minutes spent with patient and charting as part of discharge planning   Chart reviewed and case discussed with treatment team.  Patient more cooperative overall and asking about meds after discharge

## 2023-11-12 NOTE — BH INPATIENT PSYCHIATRY DISCHARGE NOTE - HPI (INCLUDE ILLNESS QUALITY, SEVERITY, DURATION, TIMING, CONTEXT, MODIFYING FACTORS, ASSOCIATED SIGNS AND SYMPTOMS)
Patient seen for admission interview  Patient agitated and loud and verbally threatening on approach  Refuses to answer questions and tells writer to "talk to Les"  Denies having any psychiatric illness despite 20 year history  Denies ever being on psychiatric medications   Has been non compliant with medication and treatment since 2020  Denies medical illness  Abruptly ended interview and refused to answer additional questions  Will not give HIPAA permission for staff to speak to family    AS PER ER Evaluation:  Patient is a 58F, domiciled alone, on disability, PPH of schizophrenia, not in current treatment, previously inpatient [facility] resident in 2004, denies prior suicide attempt, denies substance use history, denies violence/legal issues, no known PMH, brought in by EMS; activated by daughter for bizarre behavior and worsening psychotic symptoms. Upon arrival to ED patient was agitated, rambling and not making sense. She did not respond to verbal deescalation and received Ativan 2 mg Haldol 5mg IM.     On interview, pt laying on bed eating , cooperative, making appropriate eye contact. She reports her daughter called EMS because there was food on the floor, next to the garbage pan, in her home. She reports her daughter is not use to seeing her house with anything on the floor so she called 911. Patient denies any recent or current suicidal/homicidal ideation, intent or plan. and states her mood has been good. When asked questioned about auditory/visual hallucinations she replied, " I talk to Les and the holy spirts." She reports she has been communicating with Les and holy spirits for a long time and feels she has special relationship with them. Patient report recently she has been seeing  flashes of light which she thinks are spirits in her home. Patient reports she has be showering every day, has a good appetite, and sleeping at least 5 hours/night. Patient reports she has never been diagnosed with a psychiatric illness, takes no medication and has never been psychiatrically hospitalized.

## 2023-11-12 NOTE — BH INPATIENT PSYCHIATRY DISCHARGE NOTE - NSBHMETABOLIC_PSY_ALL_CORE_FT
BMI:   HbA1c:   Glucose:   BP: 139/79 (11-11-23 @ 07:59) (135/76 - 139/79)  Lipid Panel: Date/Time: 09-14-23 @ 09:30  Cholesterol, Serum: 229  Direct LDL: --  HDL Cholesterol, Serum: 62  Total Cholesterol/HDL Ration Measurement: --  Triglycerides, Serum: 96

## 2023-11-12 NOTE — BH INPATIENT PSYCHIATRY DISCHARGE NOTE - OTHER PAST PSYCHIATRIC HISTORY (INCLUDE DETAILS REGARDING ONSET, COURSE OF ILLNESS, INPATIENT/OUTPATIENT TREATMENT)
pt denies all history including hospitalizations, medication trials and suicide attempts. Per [facility], diagnosed with schizophrenia, Collateral from daughter reported patient was hospitalized at [facility] in 2004 and outpatient many years ago with Dr. Miles ( [facility]) and was on an JUAN

## 2023-11-13 PROCEDURE — 99231 SBSQ HOSP IP/OBS SF/LOW 25: CPT

## 2023-11-13 NOTE — BH INPATIENT PSYCHIATRY PROGRESS NOTE - NSBHMETABOLIC_PSY_ALL_CORE_FT
BMI:   HbA1c:   Glucose:   BP: 139/84 (11-13-23 @ 07:39) (139/79 - 152/83)  Lipid Panel: Date/Time: 09-14-23 @ 09:30  Cholesterol, Serum: 229  Direct LDL: --  HDL Cholesterol, Serum: 62  Total Cholesterol/HDL Ration Measurement: --  Triglycerides, Serum: 96

## 2023-11-13 NOTE — BH INPATIENT PSYCHIATRY PROGRESS NOTE - NSBHCHARTREVIEWVS_PSY_A_CORE FT
Vital Signs Last 24 Hrs  T(C): 36.5 (11-13-23 @ 07:39), Max: 36.5 (11-13-23 @ 07:39)  T(F): 97.7 (11-13-23 @ 07:39), Max: 97.7 (11-13-23 @ 07:39)  HR: 84 (11-13-23 @ 07:39) (84 - 84)  BP: 139/84 (11-13-23 @ 07:39) (139/84 - 139/84)  BP(mean): --  RR: --  SpO2: --

## 2023-11-13 NOTE — BH INPATIENT PSYCHIATRY PROGRESS NOTE - NSBHFUPINTERVALHXFT_PSY_A_CORE
Patient is seen and evaluated for follow up of psychosis.    Chart reviewed and case discussed with treatment team.  Patient more cooperative overall and asking about discharge   With mild tremor but refusing benztropine or nay PO medication will only take JUAN   Remains delusional but more organized

## 2023-11-13 NOTE — BH INPATIENT PSYCHIATRY PROGRESS NOTE - NSBHASSESSSUMMFT_PSY_ALL_CORE
58F,  PPH of schizophrenia, not in current treatment, previously inpatient [facility] resident in 2004, denies prior suicide attempt, denies substance use history, denies violence/legal issues, no known PMH, brought in by EMS; activated by daughter for bizarre behavior and worsening psychotic symptoms    11/13 Update  Patient calmer overall and willing to discuss past treatment and discharge plans though hesitant to agree to much  Patient with some improvement in thought disorder though she remains with grandiose and Faith delusions  TOO Granted 9/26 and will continue with haloperidol trial as per court order  Received Haloperidol Decanoate 75 mg IM 10/3 as per court order and Haloperidol  mg 10/19, and 11/2  Haloperidol level drawn but quantity of blood was insufficient and was redrawn 10/26 and is subtherapeutic at 2 mg and will increase next JUAN 11/16 to 125 mg  On 11/8 Admits to prior treatment and verifies that she was on haloperidol 200 mg which was lowered to 125 mg due to tremor    Impression  Exacerbation of schizophrenia in the context of noncompliance     PLAN  Patient requires acute inpatient care for treatment of psychosis.   Patient admitted on a 939 emergency status 9/13 and converted to 927 on 9/15 for the purpose of TOO  Administrative meeting for TOO 9/20 and for Court Hearing 9/26 which TOO was granted and medication started  Haloperidol 10 mg /diphenhydramine 50 mg /Lorazepam 2 mg as per court order PO offered, IM if refused  10/3 Haloperidol Decanoate JUAN 75 mg and 100 mg IM 10/19 and plan for 150 mg 11/16  Patient does not require constant observation at this time and will follow with 15 minute checks.  Treatment will include individual therapy/supportive therapy/ rehab therapy/ psychopharmacological therapy and milieu therapy

## 2023-11-13 NOTE — BH INPATIENT PSYCHIATRY PROGRESS NOTE - MSE UNSTRUCTURED FT
On exam today the patient  is overall more cooperative     She is less irritable with peers     Speech is regular rate, normal volume   Thought process: concrete  Thought content:  multiple bizarre Taoist delusions of being "Les" and  to "Graham"   Affect: labile from flat to irritable  Patient denied SI/HI/AH/VH;   Patient is Alert and oriented x3, not oriented to situation  Insight and judgment are poor. Impulse control is fair at this time

## 2023-11-14 PROCEDURE — 99231 SBSQ HOSP IP/OBS SF/LOW 25: CPT

## 2023-11-14 NOTE — BH INPATIENT PSYCHIATRY PROGRESS NOTE - NSBHFUPINTERVALHXFT_PSY_A_CORE
Patient is seen and evaluated for follow up of psychosis.    Chart reviewed and case discussed with treatment team.  Patient more cooperative overall, answering questions appropriately.  She remains focused on discharge.  She continues to have poor insight, sees no need for medications and doesn't feel they have been helpful.  More visible on the unit.  Continues to have mild tremor but refusing benztropine or any PO medication will only take JUAN   Remains delusional but more organized.

## 2023-11-14 NOTE — BH INPATIENT PSYCHIATRY PROGRESS NOTE - MSE UNSTRUCTURED FT
On exam today the patient is overall more cooperative     She is less irritable with peers     Speech is regular rate, normal volume   Thought process: concrete  Thought content:  multiple bizarre Methodist delusions of being "Les" and  to "Graham"   Affect: more stable, constricted  Patient denied SI/HI/AH/VH;   Patient is Alert and oriented x3, not oriented to situation  Insight and judgment are poor. Impulse control is fair at this time

## 2023-11-14 NOTE — BH INPATIENT PSYCHIATRY PROGRESS NOTE - NSBHASSESSSUMMFT_PSY_ALL_CORE
58F,  PPH of schizophrenia, not in current treatment, previously inpatient [facility] resident in 2004, denies prior suicide attempt, denies substance use history, denies violence/legal issues, no known PMH, brought in by EMS; activated by daughter for bizarre behavior and worsening psychotic symptoms    11/14 Update  Patient calmer overall and speaking more with team. Continues to have poor insight.  Patient with some improvement in thought disorder though she remains with grandiose and Pentecostal delusions  TOO Granted 9/26 and will continue with haloperidol trial as per court order  Received Haloperidol Decanoate 75 mg IM 10/3 as per court order and Haloperidol  mg 10/19, and 11/2  Haloperidol level drawn but quantity of blood was insufficient and was redrawn 10/26 and is subtherapeutic at 2 mg and will increase next JUAN 11/16 to 125 mg  On 11/8 Admits to prior treatment and verifies that she was on haloperidol 200 mg which was lowered to 125 mg due to tremor    Impression  Exacerbation of schizophrenia in the context of noncompliance     PLAN  Patient requires acute inpatient care for treatment of psychosis.   Patient admitted on a 939 emergency status 9/13 and converted to 927 on 9/15 for the purpose of TOO  Administrative meeting for TOO 9/20 and for Court Hearing 9/26 which TOO was granted and medication started  Haloperidol 10 mg /diphenhydramine 50 mg /Lorazepam 2 mg as per court order PO offered, IM if refused  10/3 Haloperidol Decanoate JUAN 75 mg and 100 mg IM 10/19 and plan for 150 mg 11/16  Patient does not require constant observation at this time and will follow with 15 minute checks.  Treatment will include individual therapy/supportive therapy/ rehab therapy/ psychopharmacological therapy and milieu therapy

## 2023-11-14 NOTE — BH INPATIENT PSYCHIATRY PROGRESS NOTE - NSBHMETABOLIC_PSY_ALL_CORE_FT
BMI:   HbA1c:   Glucose:   BP: 145/92 (11-14-23 @ 08:24) (139/84 - 152/83)  Lipid Panel: Date/Time: 09-14-23 @ 09:30  Cholesterol, Serum: 229  Direct LDL: --  HDL Cholesterol, Serum: 62  Total Cholesterol/HDL Ration Measurement: --  Triglycerides, Serum: 96

## 2023-11-14 NOTE — BH INPATIENT PSYCHIATRY PROGRESS NOTE - NSBHCHARTREVIEWVS_PSY_A_CORE FT
Vital Signs Last 24 Hrs  T(C): 36.4 (11-14-23 @ 08:24), Max: 36.4 (11-14-23 @ 08:24)  T(F): 97.5 (11-14-23 @ 08:24), Max: 97.5 (11-14-23 @ 08:24)  HR: --  BP: 145/92 (11-14-23 @ 08:24) (145/92 - 145/92)  BP(mean): 82 (11-14-23 @ 08:24) (82 - 82)  RR: --  SpO2: --

## 2023-11-15 PROCEDURE — 99231 SBSQ HOSP IP/OBS SF/LOW 25: CPT

## 2023-11-15 NOTE — LEGAL STATUS PROGRESS NOTE - NSLEGALSTATUS_PSY_ALL_CORE
9.39 Emergency Admission
9.27 Involuntary (2PC) Converted from Emergency
9.27 Involuntary (2PC) Converted from Emergency
all other ROS negative except as per HPI

## 2023-11-15 NOTE — BH TREATMENT PLAN - NSPTSTATEDGOAL_PSY_ALL_CORE
Refuses to speak to team
Refuses to speak to team
To go home
Refuses to speak to team
To go home

## 2023-11-15 NOTE — BH DISCHARGE NOTE NURSING/SOCIAL WORK/PSYCH REHAB - NSDCPRGOAL_PSY_ALL_CORE
Writer met pt to review safety plan and discuss the pt’s progress throughout this hospitalization. Pt was able to complete safety plan with psych rehab staff’s engagement. Pt identified warning signs, coping skills, people that she can reach out for support, making environment safe and the purpose of her life. Pt has made progress towards psychiatric rehabilitation goals of identifying coping skills for better sxs management. Pt is able to identify singing, listening to music, reading magazines and journaling as her coping strategies. Pt reported improvement in mood, sleep and appetite. Pt denies any SI/HI/AH/VH at this time. Pt is future oriented and is looking forward to going back home and preparing for the holiday. In this hospitalization, pt has demonstrated medication compliance as per order. Pt has been in fair behavioral control and has been increasingly visible and social on the unit. Pt is observed interacting with select peers. Pt is more cooperative and calmer with staff. Pt is able to verbalize her needs and reach out to staff for support. Pt has been increasingly receptive to skill development and started attending psych rehab groups. Pt attended 20% of psych rehab groups in this hospitalization. Pt is able to appropriately participate in group activities and discussions. Pt’s ADLs and appearance are improving. Pt is encouraged to continue working on medication compliance and coping skills, attend outpatient treatment and verbalize feelings/concerns.

## 2023-11-15 NOTE — BH TREATMENT PLAN - NSTXPLANSTARTDATE_PSY_ALL_CORE
17-Oct-2023 07:29
09-Oct-2023 15:42
26-Sep-2023 11:49
24-Oct-2023 14:01
19-Sep-2023 11:15
14-Sep-2023
24-Oct-2023 14:01
24-Oct-2023 14:01
03-Oct-2023 12:10

## 2023-11-15 NOTE — BH INPATIENT PSYCHIATRY PROGRESS NOTE - NSBHMETABOLIC_PSY_ALL_CORE_FT
BMI:   HbA1c:   Glucose:   BP: 145/92 (11-14-23 @ 08:24) (139/84 - 145/92)  Lipid Panel: Date/Time: 09-14-23 @ 09:30  Cholesterol, Serum: 229  Direct LDL: --  HDL Cholesterol, Serum: 62  Total Cholesterol/HDL Ration Measurement: --  Triglycerides, Serum: 96

## 2023-11-15 NOTE — BH SCALES AND SCREENS - NSBPRSHALLBEH_PSY_ALL_CORE
5 = Moderately Severe – hallucinations are experienced nearly every day, or are a source of extreme distress
6 = Severe – as above, and has had a moderate impact on the patient’s behavior (e.g. concentration difficulties leading to impaired work functioning)
4 = Moderate – as above, but more frequent or extensive (e.g. frequently sees the devil’s face, two voices carry on lengthy conversations)

## 2023-11-15 NOTE — BH DISCHARGE NOTE NURSING/SOCIAL WORK/PSYCH REHAB - NSCDUDCCRISIS_PSY_A_CORE
[facility]  1 (798) [facility] ([phone])  Text "WELL" to 36951  Website: www.[employer]/.[employer] [facility] 1 (886) 621-PGHB (1941) Website: www.safehorizon.org/.National Suicide Prevention Lifeline 8 (212) 928-9940/.  [employer]  1 (173) LIFENET (579-0640)/.  [facility]  [address] [place] [address] [phone]   Hours:  Monday through Friday from 9 AM to 3 PM/988 Suicide and Crisis Lifeline

## 2023-11-15 NOTE — BH SCALES AND SCREENS - NSBPRSGRANDIOS_PSY_ALL_CORE
7 = Very Severe – e.g., as above, but nearly all conversation is directed toward the patient’s grandiose delusion(s)
7 = Very Severe – e.g., as above, but nearly all conversation is directed toward the patient’s grandiose delusion(s)
5 = Moderately Severe – e.g., a single (definite) encapsulated grandiose delusion, or multiple (definite) fragmentary grandiose delusions

## 2023-11-15 NOTE — BH DISCHARGE NOTE NURSING/SOCIAL WORK/PSYCH REHAB - DISCHARGE INSTRUCTIONS AFTERCARE APPOINTMENTS
In order to check the location, date, or time of your aftercare appointment, please refer to your Discharge Instructions Document given to you upon leaving the hospital.  If you have lost the instructions please call [phone]

## 2023-11-15 NOTE — BH TREATMENT PLAN - NSTXPSYCHOINTERPR_PSY_ALL_CORE
Pt continues to make progress towards psychiatric rehabilitation goals.  Psychiatric Rehabilitation staff will continue to meet with pt individually to provide support, encouragement, and counseling so that they will continue identifying and utilizing effective coping skills for better symptom management.

## 2023-11-15 NOTE — BH TREATMENT PLAN - NSTXMEDICDATEEST_PSY_ALL_CORE
14-Sep-2023
14-Sep-2023
13-Sep-2023
13-Sep-2023
14-Sep-2023
13-Sep-2023
14-Sep-2023
14-Sep-2023
14-Nov-2023

## 2023-11-15 NOTE — BH TREATMENT PLAN - NSTXDCOPLKDATEEST_PSY_ALL_CORE
03-Nov-2023
14-Sep-2023
21-Sep-2023
05-Oct-2023
27-Oct-2023
14-Sep-2023
28-Sep-2023
21-Sep-2023
12-Oct-2023

## 2023-11-15 NOTE — BH TREATMENT PLAN - NSTXPSYCHOINTERMD_PSY_ALL_CORE
FGA Trial with haloperidol as per court order
FGA Trial with haloperidol as per court order
SGA trial with risperidone 
FGA Trial with haloperidol as per court order
FGA Trial with haloperidol as per court order
SGA trial with risperidone 
FGA Trial with haloperidol as per court order

## 2023-11-15 NOTE — BH SCALES AND SCREENS - NSBPRSBLUNTAFFECT_PSY_ALL_CORE
3 = Mild – e.g., somewhat diminished facial expression, or somewhat monotonous voice or somewhat restricted gestures
4 = Moderate – e.g., as above, but more intense, prolonged or frequent
4 = Moderate – e.g., as above, but more intense, prolonged or frequent

## 2023-11-15 NOTE — BH SCALES AND SCREENS - NSBPRSMANNPOST_PSY_ALL_CORE
2 = Very Mild – odd behavior but of doubtful clinical significance, e.g., occasional unprompted smiling, infrequent lip movements
3 = Mild – strange behavior but not obviously bizarre, e.g., infrequent head-tilting (side to side) in a rhythmic fashion, intermittent abnormal finger movements
2 = Very Mild – odd behavior but of doubtful clinical significance, e.g., occasional unprompted smiling, infrequent lip movements

## 2023-11-15 NOTE — BH TREATMENT PLAN - NSTXPSYCHODATETRGT_PSY_ALL_CORE
19-Sep-2023
19-Sep-2023
10-Oct-2023
26-Oct-2023
19-Sep-2023
04-Oct-2023
18-Oct-2023
01-Nov-2023
20-Nov-2023
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
## 2023-11-15 NOTE — BH TREATMENT PLAN - NSTXPSYCHOINTERRN_PSY_ALL_CORE
medication management/ Reality testing/ Mariam
Medication  management/ reality testing
medication management/ Reality testing/ Mariam
Medication  management/ reality testing

## 2023-11-15 NOTE — BH INPATIENT PSYCHIATRY PROGRESS NOTE - MSE UNSTRUCTURED FT
On exam today the patient is overall more cooperative     She is less irritable with peers     Speech is regular rate, normal volume   Thought process: concrete  Thought content:  multiple bizarre Mu-ism delusions of being "Les" and  to "Graham"   Affect: more stable, constricted  Patient denied SI/HI/AH/VH;   Patient is Alert and oriented x3, not oriented to situation  Insight and judgment are poor. Impulse control is fair at this time

## 2023-11-15 NOTE — BH TREATMENT PLAN - NSTXDCOPLKINTERSW_PSY_ALL_CORE
SW met with team to assess pt. Attempted to meet with pt. No consents at this time.
Support and psychoed to be provided and all elements of the discharge plan to be arranged when appropriate.
SW met with team to assess pt progress. Pt gave verbal consent to speak with sister.
SW met with pt,met with team and communicated with [facility] regarding pt returning for tx.
SW met with team to review pt progress. SW attempted to meet with pt.
Support and psychoed to be provided and all elements of the discharge plan to be arranged when appropriate.
SW met with team to asess pt progress and tx. Pt refuses to meet with SW.
SW met with team to assess pt. No consent at this time. Pt does not speak with staff.
SW met with pt, met with team and made referral to [facility], reported they would accept pt.

## 2023-11-15 NOTE — BH SCALES AND SCREENS - NSBPRSCONCDISORG_PSY_ALL_CORE
6 = Severe - formal thought disorder is present for most of the interview, and the interview is severely strained
2 = Very Mild - e.g., somewhat vague, but of doubtful clinical significance
6 = Severe - formal thought disorder is present for most of the interview, and the interview is severely strained

## 2023-11-15 NOTE — BH SCALES AND SCREENS - NSBPRSUNUTHOCON_PSY_ALL_CORE
5 = Moderately Severe – full delusional conviction, but delusion(s) has only occasional impact on behavior
7 = Very Severe – delusion(s) has major impact, e.g. stops eating because believes food is poisoned
7 = Very Severe – delusion(s) has major impact, e.g. stops eating because believes food is poisoned

## 2023-11-15 NOTE — BH TREATMENT PLAN - NSTXCAREGIVERPARTICIPATE_PSY_P_CORE
No, patient unwilling to involve family/caregiver
Family/Caregiver participated in identification of needs/problems/goals for treatment/Family/Caregiver participated in defining interventions/Family/Caregiver participated in development of after care plan

## 2023-11-15 NOTE — BH DISCHARGE NOTE NURSING/SOCIAL WORK/PSYCH REHAB - PATIENT PORTAL LINK FT
You can access the FollowMyHealth Patient Portal offered by [facility] by registering at the following website: [email or web address]. By joining [employer]’s FollowMyHealth portal, you will also be able to view your health information using other applications (apps) compatible with our system.
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
## 2023-11-15 NOTE — BH TREATMENT PLAN - NSTXMEDICINTERPR_PSY_ALL_CORE
Patient has not made progress towards this psychiatric rehabilitation goal. Psychiatric Rehabilitation staff will continue to engage with patient in order to build therapeutic rapport and to assist patient in achieving psychiatric rehabilitation goal.
Patient has made minimal progress towards this psychiatric rehabilitation goal. Psychiatric Rehabilitation staff will continue to engage with patient in order to build therapeutic rapport and to assist patient in achieving psychiatric rehabilitation goal.
Over the past seven days, patient demonstrated minimal progress towards her treatment goal as evidenced by her refusing PO medication. Patient has been receiving court ordered medication through IMs. Writer was unable to assess or explore barriers for patient to take PO medications due to patient’s uncooperativeness in engaging in individual therapy. Psychiatric rehabilitation team will continue to engage with patient to build rapport and alliance.
Patient will benefit from demonstrating medication compliance and engaging in individual and group sessions in order to identify benefits of compliance for improved symptom management and sustained recovery by day seven. Psychiatric Rehabilitation staff will engage patient daily in order to assist patient in exploring techniques to ensure better compliance.
Patient has made minimal progress towards this psychiatric rehabilitation goal. Psychiatric Rehabilitation staff will continue to engage with patient in order to build therapeutic rapport and to assist patient in achieving psychiatric rehabilitation goal.
Patient will benefit from demonstrating medication compliance and engaging in individual and group sessions in order to identify benefits of compliance for improved symptom management and sustained recovery by day seven. Psychiatric Rehabilitation staff will engage patient daily in order to assist patient in exploring techniques to ensure better compliance.
Pt continues to demonstrate minimal progress towards psychiatric rehabilitation goals over the past week. Psychiatric Rehabilitation staff will continue to meet with pt individually to provide support, encouragement, and counseling so that they will continue identifying and utilizing effective coping skills for better symptom management.
Pt has demonstrated some progress towards psychiatric rehabilitation goals over the past week. Psychiatric Rehabilitation staff will continue to meet with pt individually to provide support, encouragement, and counseling so that they will continue identifying and utilizing effective coping skills for better symptom management.
Patient has not demonstrated progress towards this psychiatric rehabilitation goal. Psychiatric Rehabilitation staff will continue to engage with patient in order to build therapeutic rapport and to assist patient in achieving psychiatric rehabilitation goal.

## 2023-11-15 NOTE — BH TREATMENT PLAN - NSTXMEDICDATETRGT_PSY_ALL_CORE
25-Oct-2023
20-Nov-2023
05-Oct-2023
21-Sep-2023
18-Oct-2023
07-Nov-2023
11-Oct-2023
21-Sep-2023
21-Sep-2023
no concerns

## 2023-11-15 NOTE — BH INPATIENT PSYCHIATRY PROGRESS NOTE - NSBHCHARTREVIEWVS_PSY_A_CORE FT
Vital Signs Last 24 Hrs  T(C): 36.2 (11-15-23 @ 06:52), Max: 36.2 (11-15-23 @ 06:52)  T(F): 97.1 (11-15-23 @ 06:52), Max: 97.1 (11-15-23 @ 06:52)  HR: --  BP: --  BP(mean): --  RR: --  SpO2: --    Orthostatic VS  11-15-23 @ 06:52  Lying BP: --/-- HR: --  Sitting BP: 139/73 HR: 79  Standing BP: --/-- HR: --  Site: --  Mode: --

## 2023-11-15 NOTE — BH TREATMENT PLAN - NSTXPLANTHERAPYSESSIONSFT_PSY_ALL_CORE
09-28-23  Type of therapy: Symptom management  Type of session: Individual  Level of patient participation: Not engaged, Resistance to participation  Duration of participation: Less than 15 minutes  Therapy conducted by: Psych rehab  Therapy Summary: Pt continues to demonstrate minimal progress towards psychiatric rehabilitation goals over the past week. Pt is not receptive to meeting with writer and has been irritable and dismissive towards treatment team. Pt is unable to identify any effective coping strategies and continues to be unable to effectively engage in safety planning at this time. Writer will continue to assess pt and encourage collaborative safety planning at an appropriate time. Pt is still resistant to taking medications, however, is now court ordered and therefore is receiving IM medications. Over the past week, pt has been minimally receptive to skill development. Pt has not attended any daily psychiatric rehabilitation groups despite encouragement. Pt is isolative to room and does not engage in any socialization with peers or staff. Pt is currently unable to effectively verbalize thoughts, needs, and feelings despite much encouragement. Pt continues to demonstrate limited insight and judgement into symptoms and treatment. Writer will continue to encourage pt to engage in treatment in order to better develop coping skills and for continued support.  
  10-18-23  Type of therapy: no groups  Type of session: Individual  Level of patient participation: Not engaged, Resistance to participation  Duration of participation: Less than 15 minutes  Therapy conducted by: Psych rehab  Therapy Summary: Writer met with patient to assess her progress towards her treatment goal. Upon approach, patient was sitting in the dayroom, watching TV. Patient was unreceptive and uncooperative to engage with writer for session as evidenced by exhibiting suspiciousness/guardedness—saying “I am fine. No need to check in on me.” Patient continued to say, “I am the same on drugs or off drugs.” Due to patient’s somewhat elevated voice and irritability, the following note will be completed based on information obtained from patient’s medical record and staff collateral. Over the past week, patient has been minimally engaging in treatment including group therapy/individual therapy, and medication compliance. Patient has been refusing court ordered PO medication and has been receiving IMs. Patient is visible in the milieu and minimally social with peers. Psychiatric rehabilitation team will continue to engage with patient daily to build rapport for increased cooperativeness in treatment.  
  11-13-23  Type of therapy: Coping skills, Inspiration and motiviation, Leisure development, Peer advocate, Stress management, Symptom management  Type of session: Individual  Level of patient participation: Participated with encouragement  Duration of participation: 20  minutes   Therapy conducted by: Psych rehab  Therapy Summary: Writer met pt for safety planning and reviewing progress towards her psych rehab goal. Pt was calm and cooperative upon approach. Pt continues to make progress into her psych rehab goal of identifying coping skills for better sxs management. Pt is able to identity singing, listening to music, reading magazines and journaling as her coping strategies. Pt reported fair mood, good sleep and appetite. Pt denies any SI/HI/AH/VH at this time. Pt continues to demonstrate limited insight into her sxs and needs of treatment. Pt reports that she feels and acts the same whether she takes medications or not. Although pt does not think she needs medications, pt has been compliant with medications with encouragement and as per court order. Pt was able to complete the most parts of safety plan with writer’s assistance. Pt was able to identify warning signs, coping skills, people she can reach out for support, making environment safe and the purpose of her life. Pt has been in fair behavioral control and has been increasingly visible on the unit. Pt is observed interacting with select peers. Pt is more cooperative and calmer with staff. Pt is able to verbalize her needs and reach out to staff for support. Pt is more receptive to skill development and started attending psych rehab groups. Pt attended 15% of psych rehab groups over the past week. Pt is able to appropriately participate in group activities and discussions. Writer will continue to encourage to engage in treatment in order to better develop coping skills and for continued support.  
  09-21-23  Type of therapy: Inspiration and motiviation, Symptom management  Type of session: Individual  Level of patient participation: Not engaged, Resistance to participation  Duration of participation: Less than 15 minutes  Therapy conducted by: Psych rehab  Therapy Summary: Writer met pt to review progress into her psych rehab goal. Pt was uncooperative and appeared to be irritable.  Pt stated that she does not want to talk and asked writer to leave. Pt has made minimal progress into her psych rehab goal of demonstrating medication compliance as well as identifying benefits of compliance. Pt demonstrates poor insight into her sxs and needs of treatment as pt has been refusing medications and is unable to identify any benefits of compliance. Pt will go to court for MOO. Pt continues to be isolative in her room. Pt is observed spending most of the time on her bed and talking to herself. Pt is minimally receptive to staff’s engagement. Pt has not attended any psych rehab groups despite encouragement. Writer will continue to engage with pt in order to build therapeutic rapport and to assist patient in achieving psych rehab goal.  
  10-11-23  Type of therapy: Coping skills, Inspiration and motiviation, Leisure development, Stress management, Symptom management  Type of session: Individual  Level of patient participation: Participated with encouragement  Duration of participation: 15 minutes  Therapy conducted by: Psych rehab  Therapy Summary: Writer met pt to review progress into her psych rehab goal. Pt was in her bed but responded to writer’s verbal prompts. Pt was calm and cooperative upon approach. Pt reported that she is doing fine on the unit, had breakfast in the morning and is waiting for her laundry to be done. Writer assisted pt to explore coping strategies to better cope with hospitalization. Pt is able to identify reading a magazine and taking a nap as her coping strategies. Writer encouraged the pt to attend psych rehab groups for skill development and socialization. Pt denies SI/HI/AH/VH at this time. Over the past week, pt continues to refuse PO medication but receives IM as per court order. Pt has been in fair behavioral control. Pt is calmer with staff. Pt continues to be isolative but was observed watching tv in dayroom over the weekend. Pt did not attend any psych rehab groups over the past week despite much encouragement. Writer will continue to engage with pt in order to build therapeutic rapport and to assist patient in achieving psych rehab goal.  
  10-04-23  Type of therapy: Coping skills, Inspiration and motiviation, Stress management, Symptom management  Type of session: Individual  Level of patient participation: Not engaged, Resistance to participation  Duration of participation: Less than 15 minutes  Therapy conducted by: Psych rehab  Therapy Summary: Writer attempted to meet pt to review progress into her psych rehab goal. Pt appeared to be irritable and guarded. Pt refused to talk to the writer by stating “go away”. Therefore, the following information was obtained from pt’s chart and interactions writer had with the pt over the past week. Pt continues demonstrating limited insight into her sxs and needs of treatment, as pt has been refusing oral medication but receives injection as per court order. Pt is minimally receptive to the engagement from the treatment team. Pt continues to be isolative in her room. Pt is observed lying on her bed and talking to herself at times. Pt is not receptive to skill development as pt has not attended any groups despite encouragement. Pt is unable to identify any effective coping strategies and continues to be unable to effectively engage in safety planning at this time. Writer will continue to assess pt and encourage collaborative safety planning at an appropriate time. Writer will continue to engage with pt in order to build therapeutic rapport and to assist patient in achieving psych rehab goal.  
  10-18-23  Type of therapy: no groups  Type of session: Individual  Level of patient participation: Not engaged, Resistance to participation  Duration of participation: Less than 15 minutes  Therapy conducted by: Psych rehab  Therapy Summary: Writer met with patient to assess her progress towards her treatment goal. Upon approach, patient was sitting in the dayroom, watching TV. Patient was unreceptive and uncooperative to engage with writer for session as evidenced by exhibiting suspiciousness/guardedness—saying “I am fine. No need to check in on me.” Patient continued to say, “I am the same on drugs or off drugs.” Due to patient’s somewhat elevated voice and irritability, the following note will be completed based on information obtained from patient’s medical record and staff collateral. Over the past week, patient has been minimally engaging in treatment including group therapy/individual therapy, and medication compliance. Patient has been refusing court ordered PO medication and has been receiving IMs. Patient is visible in the milieu and minimally social with peers. Psychiatric rehabilitation team will continue to engage with patient daily to build rapport for increased cooperativeness in treatment.

## 2023-11-15 NOTE — BH TREATMENT PLAN - NSTXPSYCHOGOAL_PSY_ALL_CORE
Other...
Will identify 2 coping skills that help mitigate hallucinations
Will identify 2 coping skills that assist with focus on reality
Other...
Other...
Will identify 2 coping skills that help mitigate hallucinations
Will identify 2 coping skills that assist with focus on reality
Will identify 2 coping skills that help mitigate hallucinations
Will identify 2 coping skills that help mitigate hallucinations

## 2023-11-15 NOTE — BH TREATMENT PLAN - NSCMSPTSTRENGTHS_PSY_ALL_CORE
Violeta/spirituality/Successful coping history/Supportive family

## 2023-11-15 NOTE — BH TREATMENT PLAN - NSTXPSYCHODATEEST_PSY_ALL_CORE
14-Nov-2023
13-Sep-2023
13-Sep-2023
27-Sep-2023
13-Sep-2023
13-Sep-2023
25-Oct-2023
13-Sep-2023
13-Sep-2023

## 2023-11-15 NOTE — BH SCALES AND SCREENS - NSBPRSSUSPIC_PSY_ALL_CORE
4 = Moderate – more frequent suspiciousness, or transient ideas of reference
7 = Very Severe – as above, but more widespread, frequent, or intense
7 = Very Severe – as above, but more widespread, frequent, or intense

## 2023-11-15 NOTE — BH SCALES AND SCREENS - NSBHSCALESCRN_PSY_ALL_CORE
Brief Psychiatric Rating Scale (BPRS)

## 2023-11-15 NOTE — BH TREATMENT PLAN - NSTXPATIENTPARTICIPATE_PSY_ALL_CORE
No, patient unwilling to participate
Patient participated in identification of needs/problems/goals for treatment/Patient participated in defining interventions/Patient participated in development of after care plan
No, patient unwilling to participate

## 2023-11-15 NOTE — BH TREATMENT PLAN - NSTXMEDICINTERRN_PSY_ALL_CORE
pt has court order medication
Continue medication education/ REinforce compliance with PO meds
Medication education/ REinforce+ Monitor compliance
Medication education/ REinforce+ Monitor compliance
Continue medication education/ REinforce compliance with PO meds
Medication education/ REinforce+ Monitor compliance

## 2023-11-15 NOTE — BH TREATMENT PLAN - NSDCCRITERIA_PSY_ALL_CORE
Patient will be less psychotic and stable for discharge with a CGI Improvement score of 2 

## 2023-11-15 NOTE — BH TREATMENT PLAN - NSTXCAREGIVERAGREEMENT_PSY_P_CORE
Patient does not have family/caregiver involved in care
Yes
Patient does not have family/caregiver involved in care

## 2023-11-15 NOTE — BH TREATMENT PLAN - NSBHPRIMARYDX_PSY_ALL_CORE
Schizophrenia    

## 2023-11-15 NOTE — BH TREATMENT PLAN - NSTXPATIENTAGREEMENT_PSY_ALL_CORE
Patient unable to participate
Yes
Patient unable to participate

## 2023-11-15 NOTE — BH SCALES AND SCREENS - NSBHPTASSESSDT_PSY_A_CORE
17-Oct-2023 07:30
19-Sep-2023 11:15
03-Oct-2023 12:10
24-Oct-2023 14:02
26-Sep-2023 11:49
08-Nov-2023 13:31
10-Oct-2023 09:25
15-Sep-2023 13:54
15-Nov-2023 12:26

## 2023-11-16 PROCEDURE — 99232 SBSQ HOSP IP/OBS MODERATE 35: CPT

## 2023-11-16 NOTE — BH INPATIENT PSYCHIATRY PROGRESS NOTE - NSBHASSESSSUMMFT_PSY_ALL_CORE
58F,  PPH of schizophrenia, not in current treatment, previously inpatient [facility] resident in 2004, denies prior suicide attempt, denies substance use history, denies violence/legal issues, no known PMH, brought in by EMS; activated by daughter for bizarre behavior and worsening psychotic symptoms    11/16 Update  Patient is calm, with no signs of psychosis, but she has poor insight into her condition. She denies medication side effects.     TOO Granted 9/26 and will continue with haloperidol trial as per court order  Received Haloperidol Decanoate 75 mg IM 10/3 as per court order and Haloperidol  mg 10/19, and 11/2  Haloperidol level drawn but quantity of blood was insufficient and was redrawn 10/26 and is subtherapeutic at 2 mg and will increase next JUAN 11/16 to 125 mg  On 11/8 Admits to prior treatment and verifies that she was on haloperidol 200 mg which was lowered to 125 mg due to tremor    Impression  Exacerbation of schizophrenia in the context of noncompliance     PLAN  Patient requires acute inpatient care for treatment of psychosis.   Patient admitted on a 939 emergency status 9/13 and converted to 927 on 9/15 for the purpose of TOO  Administrative meeting for TOO 9/20 and for Court Hearing 9/26 which TOO was granted and medication started  Haloperidol 10 mg /diphenhydramine 50 mg /Lorazepam 2 mg as per court order PO offered, IM if refused  10/3 Haloperidol Decanoate JUAN 75 mg and 100 mg IM 10/19 and plan for 150 mg 11/16  Patient does not require constant observation at this time and will follow with 15 minute checks.  Treatment will include individual therapy/supportive therapy/ rehab therapy/ psychopharmacological therapy and milieu therapy

## 2023-11-16 NOTE — BH INPATIENT PSYCHIATRY PROGRESS NOTE - NSBHCHARTREVIEWVS_PSY_A_CORE FT
Vital Signs Last 24 Hrs  T(C): 37 (11-16-23 @ 08:43), Max: 37 (11-16-23 @ 08:43)  T(F): 98.6 (11-16-23 @ 08:43), Max: 98.6 (11-16-23 @ 08:43)  HR: --  BP: --  BP(mean): --  RR: --  SpO2: --    Orthostatic VS  11-16-23 @ 08:43  Lying BP: --/-- HR: --  Sitting BP: 138/92 HR: 80  Standing BP: 147/99 HR: 80  Site: --  Mode: --  Orthostatic VS  11-15-23 @ 06:52  Lying BP: --/-- HR: --  Sitting BP: 139/73 HR: 79  Standing BP: 134/76 HR: 82  Site: --  Mode: --

## 2023-11-16 NOTE — BH INPATIENT PSYCHIATRY PROGRESS NOTE - NSBHFUPINTERVALHXFT_PSY_A_CORE
Patient is seen and evaluated for follow up of psychosis.    Chart reviewed and case discussed with treatment team.    Patient is seen in the group room under no acute distress and amenable to interview. The patient reports that she is doing well. Acknowledges a diagnosis of schizophrenia but does not understand what this means. I provided some education regarding the symptoms of psychosis, but she denies that she has had such symptoms. She is looking forward to leaving the hospital on Monday and taking a long bath. She denies medication side effects since receiving her JUAN. Reports stable sleep and appetite. Denies SI/HI/AVH.

## 2023-11-16 NOTE — BH INPATIENT PSYCHIATRY PROGRESS NOTE - NSBHMETABOLIC_PSY_ALL_CORE_FT
BMI:   HbA1c:   Glucose:   BP: 145/92 (11-14-23 @ 08:24) (145/92 - 145/92)  Lipid Panel: Date/Time: 09-14-23 @ 09:30  Cholesterol, Serum: 229  Direct LDL: --  HDL Cholesterol, Serum: 62  Total Cholesterol/HDL Ration Measurement: --  Triglycerides, Serum: 96

## 2023-11-17 PROCEDURE — 99232 SBSQ HOSP IP/OBS MODERATE 35: CPT

## 2023-11-17 NOTE — BH INPATIENT PSYCHIATRY PROGRESS NOTE - NSBHASSESSSUMMFT_PSY_ALL_CORE
58F,  PPH of schizophrenia, not in current treatment, previously inpatient [facility] resident in 2004, denies prior suicide attempt, denies substance use history, denies violence/legal issues, no known PMH, brought in by EMS; activated by daughter for bizarre behavior and worsening psychotic symptoms    11/16, 11/17 Update  Patient is calm, with no signs of psychosis, but she has poor insight into her condition. She denies medication side effects.     TOO Granted 9/26 and will continue with haloperidol trial as per court order  Received Haloperidol Decanoate 75 mg IM 10/3 as per court order and Haloperidol  mg 10/19, and 11/2  Haloperidol level drawn but quantity of blood was insufficient and was redrawn 10/26 and is subtherapeutic at 2 mg and will increase next JUAN 11/16 to 125 mg  On 11/8 Admits to prior treatment and verifies that she was on haloperidol 200 mg which was lowered to 125 mg due to tremor    Impression  Exacerbation of schizophrenia in the context of noncompliance     PLAN  Patient requires acute inpatient care for treatment of psychosis.   Patient admitted on a 939 emergency status 9/13 and converted to 927 on 9/15 for the purpose of TOO  Administrative meeting for TOO 9/20 and for Court Hearing 9/26 which TOO was granted and medication started  Haloperidol 10 mg /diphenhydramine 50 mg /Lorazepam 2 mg as per court order PO offered, IM if refused  10/3 Haloperidol Decanoate JUAN 75 mg and 100 mg IM 10/19 and plan for 150 mg 11/16  Patient does not require constant observation at this time and will follow with 15 minute checks.  Treatment will include individual therapy/supportive therapy/ rehab therapy/ psychopharmacological therapy and milieu therapy

## 2023-11-17 NOTE — BH INPATIENT PSYCHIATRY PROGRESS NOTE - NS ED BHA REVIEW OF ED CHART VITAL SIGNS REVIEWED
Yes

## 2023-11-17 NOTE — BH INPATIENT PSYCHIATRY PROGRESS NOTE - NSTXMEDICDATENEW_PSY_ALL_CORE
26-Sep-2023

## 2023-11-17 NOTE — BH INPATIENT PSYCHIATRY PROGRESS NOTE - NSTXDCOPLKDATETRGT_PSY_ALL_CORE
28-Sep-2023
21-Sep-2023
28-Sep-2023
19-Oct-2023
28-Sep-2023
17-Nov-2023
28-Sep-2023
03-Nov-2023
03-Nov-2023
18-Oct-2023
21-Sep-2023
21-Sep-2023
03-Nov-2023
10-Nov-2023
26-Oct-2023
26-Oct-2023
10-Nov-2023
12-Oct-2023
19-Oct-2023
21-Sep-2023
28-Sep-2023
12-Oct-2023
28-Sep-2023
12-Oct-2023
12-Oct-2023
28-Sep-2023
21-Sep-2023
05-Oct-2023
12-Oct-2023
26-Oct-2023
28-Sep-2023
03-Nov-2023
03-Nov-2023
01-Nov-2023
28-Sep-2023
05-Oct-2023
28-Sep-2023
19-Oct-2023
28-Sep-2023
10-Nov-2023
10-Nov-2023
05-Oct-2023
12-Oct-2023
19-Oct-2023
26-Oct-2023
28-Sep-2023
01-Nov-2023
05-Oct-2023
26-Oct-2023
21-Sep-2023
03-Nov-2023

## 2023-11-17 NOTE — BH INPATIENT PSYCHIATRY PROGRESS NOTE - NSBHCONSDANGERSELF_PSY_A_CORE
unable to care for self

## 2023-11-17 NOTE — BH INPATIENT PSYCHIATRY PROGRESS NOTE - NSTXMEDICGOAL_PSY_ALL_CORE
Be able to describe the benefit of medication/treatment
Take all medications as prescribed
Be able to describe the benefit of medication/treatment
Take all medications as prescribed
Be able to describe the benefit of medication/treatment

## 2023-11-17 NOTE — BH INPATIENT PSYCHIATRY PROGRESS NOTE - NSTXPSYCHODATENEW_PSY_ALL_CORE
26-Sep-2023

## 2023-11-17 NOTE — BH INPATIENT PSYCHIATRY PROGRESS NOTE - NSTXPROBDCOPLK_PSY_ALL_CORE
DISCHARGE ISSUE - LACK OF APPROPRIATE OUTPATIENT SERVICES

## 2023-11-17 NOTE — BH INPATIENT PSYCHIATRY PROGRESS NOTE - NSBHATTESTBILLING_PSY_A_CORE
38463-Hzqsjmpkss OBS or IP - moderate complexity OR 35-49 mins
40475-Uoyjvashrh OBS or IP - low complexity OR 25-34 mins
31623-Jzjuoefloh OBS or IP - moderate complexity OR 35-49 mins
46570-Gxruzoasab OBS or IP - low complexity OR 25-34 mins
24710-Ukoyeekkxw OBS or IP - moderate complexity OR 35-49 mins
92309-Zsghbmlnvm OBS or IP - moderate complexity OR 35-49 mins
41883-Ribldswqsv OBS or IP - low complexity OR 25-34 mins
61412-Shqrqyonfr OBS or IP - moderate complexity OR 35-49 mins
43730-Iccmicpjow OBS or IP - moderate complexity OR 35-49 mins
57299-Kkeduuovze OBS or IP - moderate complexity OR 35-49 mins
66709-Ymcztotzjc OBS or IP - moderate complexity OR 35-49 mins
90561-Pceozqdlgy OBS or IP - moderate complexity OR 35-49 mins
89958-Bzdvcsxpjj OBS or IP - low complexity OR 25-34 mins
46739-Qgqktkosce OBS or IP - low complexity OR 25-34 mins
95543-Lnwtupvpwc OBS or IP - moderate complexity OR 35-49 mins
85078-Arnrnarunk OBS or IP - low complexity OR 25-34 mins
29700-Lgqzntaagk OBS or IP - low complexity OR 25-34 mins
93500-Rhugsfsots OBS or IP - moderate complexity OR 35-49 mins
27165-Tmmacojszy OBS or IP - moderate complexity OR 35-49 mins
85546-Wpftqdlbmn OBS or IP - moderate complexity OR 35-49 mins
81235-Hpjlhjsuqc OBS or IP - low complexity OR 25-34 mins
87324-Elcfqxhelu OBS or IP - low complexity OR 25-34 mins
30353-Xpmmqysfuv OBS or IP - low complexity OR 25-34 mins
25174-Zyfndvcwqh OBS or IP - low complexity OR 25-34 mins
10994-Rgzcqxtntv OBS or IP - moderate complexity OR 35-49 mins
80465-Jfftvwbhgr OBS or IP - low complexity OR 25-34 mins
56052-Jfkrqtvllg OBS or IP - low complexity OR 25-34 mins
73489-Ukdhhmvaqo OBS or IP - low complexity OR 25-34 mins
89001-Yzygdtemxu OBS or IP - moderate complexity OR 35-49 mins
70427-Qnimvcctmu OBS or IP - moderate complexity OR 35-49 mins
12724-Ygqzteamcu OBS or IP - low complexity OR 25-34 mins
36539-Uvqhmqlqll OBS or IP - moderate complexity OR 35-49 mins
34888-Bqxtsevieg OBS or IP - low complexity OR 25-34 mins
70924-Afvtopaool OBS or IP - low complexity OR 25-34 mins
76809-Tsdsnypidc OBS or IP - low complexity OR 25-34 mins
34662-Znskwjlrab OBS or IP - high complexity OR 50-79 mins
52976-Cqxjtpnvcr OBS or IP - moderate complexity OR 35-49 mins
75177-Ubpdznmdbr OBS or IP - low complexity OR 25-34 mins
20789-Ioqwtfwnkt OBS or IP - moderate complexity OR 35-49 mins
31655-Tlsywmfrza OBS or IP - low complexity OR 25-34 mins
85787-Kqtojszfcf OBS or IP - low complexity OR 25-34 mins
08117-Sqbeefhhlr OBS or IP - low complexity OR 25-34 mins
49237-Emkbgrexoj OBS or IP - low complexity OR 25-34 mins
76884-Ynbkqitwge OBS or IP - low complexity OR 25-34 mins
41790-Imjesdzccn OBS or IP - moderate complexity OR 35-49 mins
81465-Rmqqycifdw OBS or IP - low complexity OR 25-34 mins
94622-Cbcncdbaoh OBS or IP - moderate complexity OR 35-49 mins
02401-Uvjqnkbakg OBS or IP - moderate complexity OR 35-49 mins
49703-Xvbgylsekp OBS or IP - low complexity OR 25-34 mins
80218-Fcfmqthiyx OBS or IP - moderate complexity OR 35-49 mins
45818-Wkzcaifudt OBS or IP - moderate complexity OR 35-49 mins

## 2023-11-17 NOTE — BH INPATIENT PSYCHIATRY PROGRESS NOTE - NSBHMETABOLIC_PSY_ALL_CORE_FT
BMI:   HbA1c:   Glucose:   BP: 143/91 (11-17-23 @ 06:21) (143/91 - 143/91)  Lipid Panel: Date/Time: 09-14-23 @ 09:30  Cholesterol, Serum: 229  Direct LDL: --  HDL Cholesterol, Serum: 62  Total Cholesterol/HDL Ration Measurement: --  Triglycerides, Serum: 96

## 2023-11-17 NOTE — BH INPATIENT PSYCHIATRY PROGRESS NOTE - TELEPSYCHIATRY?
No

## 2023-11-17 NOTE — BH INPATIENT PSYCHIATRY PROGRESS NOTE - NSTXDCOPLKPROGRES_PSY_ALL_CORE
Improving
No Change
Improving
No Change
Improving
No Change
No Change
Improving
No Change
Improving
No Change
No Change
Improving
No Change
Improving
No Change
Improving
Improving
No Change
Improving
No Change
No Change
Improving
Improving
No Change
Improving
No Change
Improving
No Change
Improving

## 2023-11-17 NOTE — BH INPATIENT PSYCHIATRY PROGRESS NOTE - NSTXDCOPLKDATENEW_PSY_ALL_CORE
21-Sep-2023
15-Nov-2023
21-Sep-2023
15-Nov-2023
21-Sep-2023
15-Nov-2023
21-Sep-2023
15-Nov-2023
21-Sep-2023
15-Nov-2023
21-Sep-2023
15-Nov-2023
15-Nov-2023
21-Sep-2023
15-Nov-2023
21-Sep-2023
15-Nov-2023
21-Sep-2023
15-Nov-2023
21-Sep-2023
15-Nov-2023

## 2023-11-17 NOTE — BH INPATIENT PSYCHIATRY PROGRESS NOTE - NSBHATTESTTYPEVISIT_PSY_A_CORE
Attending Only
YOGESH without on-site Attending supervision
Attending Only

## 2023-11-17 NOTE — BH INPATIENT PSYCHIATRY PROGRESS NOTE - NSBHCHARTREVIEWVS_PSY_A_CORE FT
Vital Signs Last 24 Hrs  T(C): 36.3 (11-17-23 @ 06:21), Max: 36.3 (11-17-23 @ 06:21)  T(F): 97.4 (11-17-23 @ 06:21), Max: 97.4 (11-17-23 @ 06:21)  HR: 84 (11-17-23 @ 06:21) (84 - 84)  BP: 143/91 (11-17-23 @ 06:21) (143/91 - 143/91)  BP(mean): --  RR: --  SpO2: --    Orthostatic VS  11-16-23 @ 08:43  Lying BP: --/-- HR: --  Sitting BP: 138/92 HR: 80  Standing BP: 147/99 HR: 80  Site: --  Mode: --

## 2023-11-17 NOTE — BH INPATIENT PSYCHIATRY PROGRESS NOTE - NS ED BHA MED ROS PSYCHIATRIC
See HPI

## 2023-11-17 NOTE — BH INPATIENT PSYCHIATRY PROGRESS NOTE - NSBHMSETHTPROC_PSY_A_CORE
vague and likely minimizing/Impaired reasoning

## 2023-11-17 NOTE — BH INPATIENT PSYCHIATRY PROGRESS NOTE - NSCGISEVERILLNESS_PSY_ALL_CORE
6 = Severely ill - disruptive pathology, behavior and function are frequently influenced by symptoms, may require assistance from others
7 = Among the most extremely ill patients – pathology drastically interferes in many life functions; may be hospitalized
6 = Severely ill - disruptive pathology, behavior and function are frequently influenced by symptoms, may require assistance from others
7 = Among the most extremely ill patients – pathology drastically interferes in many life functions; may be hospitalized
6 = Severely ill - disruptive pathology, behavior and function are frequently influenced by symptoms, may require assistance from others
6 = Severely ill - disruptive pathology, behavior and function are frequently influenced by symptoms, may require assistance from others
7 = Among the most extremely ill patients – pathology drastically interferes in many life functions; may be hospitalized
5 = Markedly ill - intrusive symptoms that distinctly impair social/occupational function or cause intrusive levels of distress
7 = Among the most extremely ill patients – pathology drastically interferes in many life functions; may be hospitalized
6 = Severely ill - disruptive pathology, behavior and function are frequently influenced by symptoms, may require assistance from others
7 = Among the most extremely ill patients – pathology drastically interferes in many life functions; may be hospitalized
4 = Moderately ill – overt symptoms causing noticeable, but modest, functional impairment or distress; symptom level may warrant medication
7 = Among the most extremely ill patients – pathology drastically interferes in many life functions; may be hospitalized
4 = Moderately ill – overt symptoms causing noticeable, but modest, functional impairment or distress; symptom level may warrant medication

## 2023-11-17 NOTE — BH INPATIENT PSYCHIATRY PROGRESS NOTE - NSTXPSYCHOPROGRES_PSY_ALL_CORE
Improving
No Change
Improving
No Change
No Change
Improving
Improving
No Change
Improving
No Change
Improving
No Change
No Change
Improving
No Change
Improving
No Change
Improving
No Change
Improving
No Change
Improving
No Change
No Change
Improving
No Change
Improving

## 2023-11-17 NOTE — BH INPATIENT PSYCHIATRY PROGRESS NOTE - NSICDXBHPRIMARYDX_PSY_ALL_CORE
Schizophrenia   F20.9  

## 2023-11-17 NOTE — BH INPATIENT PSYCHIATRY PROGRESS NOTE - NSTXPSYCHOINTERMD_PSY_ALL_CORE
FGA Trial with haloperidol as per court order
SGA trial with risperidone 
FGA Trial with haloperidol as per court order
SGA trial with risperidone 
FGA Trial with haloperidol as per court order
SGA trial with risperidone 
FGA Trial with haloperidol as per court order
SGA trial with risperidone 
FGA Trial with haloperidol as per court order
SGA trial with risperidone 
FGA Trial with haloperidol as per court order
FGA Trial with haloperidol as per court order
SGA trial with risperidone 
FGA Trial with haloperidol as per court order
SGA trial with risperidone 
SGA trial with risperidone 
FGA Trial with haloperidol as per court order
FGA Trial with haloperidol as per court order
SGA trial with risperidone 
FGA Trial with haloperidol as per court order
FGA Trial with haloperidol as per court order
SGA trial with risperidone 
FGA Trial with haloperidol as per court order
SGA trial with risperidone 
SGA trial with risperidone 
FGA Trial with haloperidol as per court order
FGA Trial with haloperidol as per court order
SGA trial with risperidone 
FGA Trial with haloperidol as per court order
FGA Trial with haloperidol as per court order

## 2023-11-17 NOTE — BH INPATIENT PSYCHIATRY PROGRESS NOTE - NSTXDCOPLKDATEEST_PSY_ALL_CORE
19-Oct-2023
12-Oct-2023
21-Sep-2023
05-Oct-2023
03-Nov-2023
05-Oct-2023
21-Sep-2023
27-Oct-2023
27-Oct-2023
28-Sep-2023
14-Sep-2023
21-Sep-2023
28-Sep-2023
28-Sep-2023
05-Oct-2023
14-Sep-2023
05-Oct-2023
25-Oct-2023
28-Sep-2023
14-Sep-2023
21-Sep-2023
10-Nov-2023
27-Oct-2023
05-Oct-2023
03-Nov-2023
14-Sep-2023
19-Oct-2023
21-Sep-2023
12-Oct-2023
19-Oct-2023
05-Oct-2023
14-Sep-2023
21-Sep-2023
21-Sep-2023
19-Oct-2023
21-Sep-2023
12-Oct-2023
21-Sep-2023
27-Oct-2023
14-Sep-2023
03-Nov-2023
12-Oct-2023
25-Oct-2023
27-Oct-2023
19-Oct-2023
27-Oct-2023
03-Nov-2023
28-Sep-2023

## 2023-11-17 NOTE — BH INPATIENT PSYCHIATRY PROGRESS NOTE - NSBHROSSYSTEMS_PSY_ALL_CORE
Psychiatric

## 2023-11-17 NOTE — BH INPATIENT PSYCHIATRY PROGRESS NOTE - NSTXPSYCHODATEEST_PSY_ALL_CORE
25-Oct-2023
13-Sep-2023
25-Oct-2023
13-Sep-2023
27-Sep-2023
13-Sep-2023
25-Oct-2023
13-Sep-2023
25-Oct-2023
27-Sep-2023
13-Sep-2023
08-Nov-2023
13-Sep-2023
25-Oct-2023
13-Sep-2023
27-Sep-2023
27-Sep-2023
08-Nov-2023
25-Oct-2023
13-Sep-2023
25-Oct-2023
25-Oct-2023
13-Sep-2023
13-Sep-2023
27-Sep-2023
08-Nov-2023
25-Oct-2023
13-Sep-2023
13-Sep-2023
25-Oct-2023
13-Sep-2023
14-Nov-2023

## 2023-11-17 NOTE — BH INPATIENT PSYCHIATRY PROGRESS NOTE - NSBHMSESPEECH_PSY_A_CORE
drowsy s/p Haldol and Ativan/Normal volume, rate, productivity, spontaneity and articulation
drowsy s/p Haldol and Ativan/Normal volume, rate, productivity, spontaneity and articulation
drowsy s/p Haldol and Ativan/Abnormal as indicated, otherwise normal...

## 2023-11-17 NOTE — BH INPATIENT PSYCHIATRY PROGRESS NOTE - NSTXMEDICPROGRES_PSY_ALL_CORE
Improving
Improving
No Change
Improving
No Change
Improving
No Change
No Change
Improving
Improving
No Change
Improving
No Change
No Change
Improving
No Change
Improving
Improving
No Change
Improving
No Change
Improving
No Change
No Change
Improving
No Change
Improving
No Change

## 2023-11-17 NOTE — BH INPATIENT PSYCHIATRY PROGRESS NOTE - NSBHPROGSUIC_PSY_A_CORE
no

## 2023-11-17 NOTE — BH INPATIENT PSYCHIATRY PROGRESS NOTE - NSTXDCOPLKGOAL_PSY_ALL_CORE
Will agree to consider an appropriate level of outpatient care

## 2023-11-17 NOTE — BH INPATIENT PSYCHIATRY PROGRESS NOTE - NSTXMEDICDATEEST_PSY_ALL_CORE
13-Sep-2023
14-Sep-2023
13-Sep-2023
13-Sep-2023
14-Sep-2023
13-Sep-2023
27-Sep-2023
13-Sep-2023
13-Sep-2023
14-Sep-2023
13-Sep-2023
14-Sep-2023
13-Sep-2023
13-Sep-2023
14-Sep-2023
14-Sep-2023
27-Sep-2023
14-Sep-2023
13-Sep-2023
14-Sep-2023
25-Oct-2023
13-Sep-2023
14-Sep-2023
13-Sep-2023
13-Sep-2023
14-Sep-2023
13-Sep-2023
14-Sep-2023
13-Sep-2023
14-Nov-2023
14-Sep-2023
13-Sep-2023
14-Sep-2023
14-Sep-2023
14-Nov-2023
13-Sep-2023
14-Nov-2023

## 2023-11-17 NOTE — BH INPATIENT PSYCHIATRY PROGRESS NOTE - NSDCCRITERIA_PSY_ALL_CORE
Patient will be less psychotic and stable for discharge with a CGI Improvement score of 2 

## 2023-11-17 NOTE — BH INPATIENT PSYCHIATRY PROGRESS NOTE - NSTXPSYCHODATETRGT_PSY_ALL_CORE
20-Nov-2023
10-Oct-2023
01-Nov-2023
19-Sep-2023
26-Oct-2023
26-Oct-2023
20-Nov-2023
01-Nov-2023
01-Nov-2023
19-Sep-2023
26-Oct-2023
10-Oct-2023
18-Oct-2023
19-Sep-2023
19-Sep-2023
01-Nov-2023
04-Oct-2023
10-Oct-2023
10-Oct-2023
15-Nov-2023
04-Oct-2023
04-Oct-2023
15-Nov-2023
01-Nov-2023
19-Sep-2023
20-Nov-2023
01-Nov-2023
18-Oct-2023
19-Sep-2023
01-Nov-2023
19-Sep-2023
01-Nov-2023
01-Nov-2023
04-Oct-2023
18-Oct-2023
01-Nov-2023
19-Sep-2023
26-Oct-2023
04-Oct-2023
19-Sep-2023
20-Nov-2023
18-Oct-2023
18-Oct-2023
19-Sep-2023
15-Nov-2023
18-Oct-2023
10-Oct-2023
18-Oct-2023
20-Nov-2023

## 2023-11-17 NOTE — BH INPATIENT PSYCHIATRY PROGRESS NOTE - NSBHFUPINTERVALHXFT_PSY_A_CORE
Patient is seen and evaluated for follow up of psychosis.    Chart reviewed and case discussed with treatment team.    Patient is seen in the bedroom under no acute distress and amenable to interview. Patient states that she is feeling good today. She acknowledges the plan for discharge on Monday and is excited about this. 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 Reports some shaking, which she attributes the medication, but otherwise denies medication side effects. She reports stable sleep and appetite. Denies SI/HI/AVH.

## 2023-11-17 NOTE — BH INPATIENT PSYCHIATRY PROGRESS NOTE - NSBHCONSULTIPREASON_PSY_A_CORE
danger to self; mental illness expected to respond to inpatient care

## 2023-11-17 NOTE — BH INPATIENT PSYCHIATRY PROGRESS NOTE - NSBHROSSTATEMENT_PSY_A_CORE
.

## 2023-11-17 NOTE — BH INPATIENT PSYCHIATRY PROGRESS NOTE - NSBHCONTPROVIDER_PSY_ALL_CORE
Not applicable

## 2023-11-17 NOTE — BH INPATIENT PSYCHIATRY PROGRESS NOTE - NSBHANTIPSYCHOTIC_PSY_ALL_CORE
Yes...

## 2023-11-17 NOTE — BH INPATIENT PSYCHIATRY PROGRESS NOTE - NSTXMEDICDATETRGT_PSY_ALL_CORE
25-Oct-2023
21-Sep-2023
21-Sep-2023
07-Nov-2023
04-Oct-2023
11-Oct-2023
11-Oct-2023
21-Sep-2023
01-Nov-2023
20-Nov-2023
10-Oct-2023
05-Oct-2023
01-Nov-2023
25-Oct-2023
07-Nov-2023
18-Oct-2023
21-Sep-2023
01-Nov-2023
05-Oct-2023
07-Nov-2023
18-Oct-2023
05-Oct-2023
07-Nov-2023
20-Nov-2023
21-Sep-2023
21-Sep-2023
14-Nov-2023
01-Nov-2023
04-Oct-2023
18-Oct-2023
18-Oct-2023
21-Sep-2023
07-Nov-2023
21-Sep-2023
01-Nov-2023
07-Nov-2023
07-Nov-2023
25-Oct-2023
14-Nov-2023
21-Sep-2023
21-Sep-2023
18-Oct-2023
21-Sep-2023
11-Oct-2023
18-Oct-2023
11-Oct-2023
21-Sep-2023
25-Oct-2023
07-Nov-2023
18-Oct-2023
20-Nov-2023

## 2023-11-17 NOTE — BH INPATIENT PSYCHIATRY PROGRESS NOTE - NSTXPROBMEDIC_PSY_ALL_CORE
MEDICATION/TREATMENT NON-COMPLIANCE

## 2023-11-17 NOTE — BH INPATIENT PSYCHIATRY PROGRESS NOTE - NSBHFUPMEDSE_PSY_A_CORE
None known

## 2023-11-17 NOTE — BH INPATIENT PSYCHIATRY PROGRESS NOTE - NSTREATMENTCERTY_PSY_ALL_CORE

## 2023-11-20 VITALS — TEMPERATURE: 98 F

## 2023-11-20 RX ORDER — HALOPERIDOL DECANOATE 100 MG/ML
125 INJECTION INTRAMUSCULAR
Qty: 1 | Refills: 0
Start: 2023-11-20

## 2023-11-30 NOTE — SOCIAL WORK POST DISCHARGE FOLLOW UP NOTE - NSBHSWFOLLOWUP_PSY_ALL_CORE_FT
SW communicated with pts sister, pt spent thanksgiving with family but did not follow up with appt. Pts daughter and  have not been involved with pts care. Sister is concerned and will make new appt for pt.

## 2024-10-04 NOTE — BH INPATIENT PSYCHIATRY ASSESSMENT NOTE - HPI (INCLUDE ILLNESS QUALITY, SEVERITY, DURATION, TIMING, CONTEXT, MODIFYING FACTORS, ASSOCIATED SIGNS AND SYMPTOMS)
Patient seen for admission interview  Patient agitated and loud and verbally threatening on approach  Refuses to answer questions and tells writer to "talk to Les"  Denies having any psychiatric illness despite 20 year history  Denies ever being on psychiatric medications   Has been non compliant with medication and treatment since 2020  Denies medical illness  Abruptly ended interview and refused to answer additional questions  Will not give HIPAA permission for staff to speak to family    AS PER ER Evaluation:  Patient is a 58F, domiciled alone, on disability, PPH of schizophrenia, not in current treatment, previously inpatient [place] resident in 2004, denies prior suicide attempt, denies substance use history, denies violence/legal issues, no known PMH, brought in by EMS; activated by daughter for bizarre behavior and worsening psychotic symptoms. Upon arrival to ED patient was agitated, rambling and not making sense. She did not respond to verbal deescalation and received Ativan 2 mg Haldol 5mg IM.     On interview, pt laying on bed eating , cooperative, making appropriate eye contact. She reports her daughter called EMS because there was food on the floor, next to the garbage pan, in her home. She reports her daughter is not use to seeing her house with anything on the floor so she called 911. Patient denies any recent or current suicidal/homicidal ideation, intent or plan. and states her mood has been good. When asked questioned about auditory/visual hallucinations she replied, " I talk to Les and the holy spirts." She reports she has been communicating with Les and holy spirits for a long time and feels she has special relationship with them. Patient report recently she has been seeing  flashes of light which she thinks are spirits in her home. Patient reports she has be showering every day, has a good appetite, and sleeping at least 5 hours/night. Patient reports she has never been diagnosed with a psychiatric illness, takes no medication and has never been psychiatrically hospitalized.     
04-Oct-2024 23:29

## 2024-12-11 NOTE — BH INPATIENT PSYCHIATRY PROGRESS NOTE - NSBHFUPINTERVALCCFT_PSY_A_CORE
Progress Note - Behavioral Health   Jamaica Kilgore 27 y o  female MRN: [id number]  Unit/Bed#: GG1 425-32 Encounter: [id number]    Assessment/Plan   Principal Problem:    Encounter for psychological evaluation  Active Problems:    Chest pain    Gastroesophageal reflux disease    Cystitis    Pseudoseizure      Behavior over the last 24 hours:  unchanged  Sleep: normal  Appetite: normal  Medication side effects: No  ROS: no complaints    Mental Status Evaluation:  Appearance:  disheveled   Behavior:  psychomotor retardation   Speech:  soft   Mood:  anxious and depressed   Affect:  labile   Thought Process:  disorganized   Thought Content:  delusions  somatic   Perceptual Disturbances: denies   Risk Potential: Suicidal Ideations without plan and Homicidal Ideations with plan punch sister in the nose   Sensorium:  person and place   Cognition:  grossly intact   Consciousness:  awake    Attention: attention span and concentration were age appropriate   Insight:  limited   Judgment: limited   Gait/Station: normal gait/station   Motor Activity: no abnormal movements     Progress Toward Goals: Pt seen asleep in bed,much more withdrawn and isolative with little engagement in unit milue  Mood cont to be labile with irritability persist She however remains tretament compliant  Staff report no overnight issues    Recommended Treatment:   1-Cont current medication  2-Continue with group therapy, milieu therapy and occupational therapy  Risks, benefits and possible side effects of Medications:   Risks, benefits, and possible side effects of medications explained to patient and patient verbalizes understanding        Medications:   current meds:   Current Facility-Administered Medications   Medication Dose Route Frequency    acetaminophen (TYLENOL) tablet 650 mg  650 mg Oral Q4H PRN    acetaminophen (TYLENOL) tablet 975 mg  975 mg Oral Q6H PRN    albuterol (PROVENTIL HFA,VENTOLIN HFA) inhaler 2 puff  2 puff Inhalation Q4H PRN    benztropine (COGENTIN) injection 1 mg  1 mg Intramuscular Q6H PRN    benztropine (COGENTIN) tablet 1 mg  1 mg Oral Q6H PRN    citalopram (CeleXA) tablet 20 mg  20 mg Oral Daily    diphenhydrAMINE (BENADRYL) tablet 50 mg  50 mg Oral HS PRN    famotidine (PEPCID) tablet 20 mg  20 mg Oral Q12H ASHUTOSH    fluticasone (FLONASE) 50 mcg/act nasal spray 2 spray  2 spray Each Nare Daily    gabapentin (NEURONTIN) capsule 300 mg  300 mg Oral TID    haloperidol (HALDOL) tablet 5 mg  5 mg Oral Q8H PRN    haloperidol lactate (HALDOL) injection 5 mg  5 mg Intramuscular Q6H PRN    hydrOXYzine HCL (ATARAX) tablet 50 mg  50 mg Oral Q6H PRN    ibuprofen (MOTRIN) tablet 400 mg  400 mg Oral Q8H PRN    loratadine (CLARITIN) tablet 10 mg  10 mg Oral Daily    LORazepam (ATIVAN) 2 mg/mL injection 2 mg  2 mg Intramuscular Q6H PRN    LORazepam (ATIVAN) tablet 2 mg  2 mg Oral Q6H PRN    lurasidone (LATUDA) tablet 100 mg  100 mg Oral Daily With Breakfast    magnesium hydroxide (MILK OF MAGNESIA) 400 mg/5 mL oral suspension 30 mL  30 mL Oral Daily PRN    ondansetron (ZOFRAN-ODT) dispersible tablet 4 mg  4 mg Oral Q8H PRN    OXcarbazepine (TRILEPTAL) tablet 450 mg  450 mg Oral BID    promethazine (PHENERGAN) tablet 25 mg  25 mg Oral Q6H PRN    traZODone (DESYREL) tablet 50 mg  50 mg Oral HS PRN     Labs: I have personally reviewed pt's labs    Counseling / Coordination of Care  Total floor / unit time spent today 25 minutes  Greater than 50% of total time was spent with the patient and / or family counseling and / or coordination of care   A description of the counseling / coordination of care: "Don't even try and talk with me !!" Negative

## 2025-04-24 NOTE — BH INPATIENT PSYCHIATRY PROGRESS NOTE - NSBHASSESSSUMMFT_PSY_ALL_CORE
no 58F,  PPH of schizophrenia, not in current treatment, previously inpatient [place] resident in 2004, denies prior suicide attempt, denies substance use history, denies violence/legal issues, no known PMH, brought in by EMS; activated by daughter for bizarre behavior and worsening psychotic symptoms    9/29 Update  Patient remains uncooperative, irritable, paranoid on examination   Took PO meds for the first time   Pt with significant thought disorder, grandiose and Zoroastrianism delusions  TOO Granted 9/26 and will continue with haloperidol trial as per court order    Impression  Exacerbation of schizophrenia in the context of noncompliance    PLAN  Patient requires acute inpatient care for treatment of psychosis.   Patient admitted on a 939 emergency status 9/13 and converted to 927 on 9/15 for the purpose of TOO  Administrative meeting for TOO 9/20 and for Court Hearing 9/26 which TOO was granted and medication started  Haloperidol 10 mg /diphenhydramine 50 mg /Lorazepam 2 mg as per court order  Patient does not require constant observation at this time and will follow with 15 minute checks.  Treatment will include individual therapy/supportive therapy/ rehab therapy/ psychopharmacological therapy and milieu therapy   58F,  PPH of schizophrenia, not in current treatment, previously inpatient [place] resident in 2004, denies prior suicide attempt, denies substance use history, denies violence/legal issues, no known PMH, brought in by EMS; activated by daughter for bizarre behavior and worsening psychotic symptoms    9/29 Update  Patient remains uncooperative, irritable, paranoid on examination   Refused PO meds and again received IM meds as per court order  Pt with significant thought disorder, grandiose and Pentecostal delusions  TOO Granted 9/26 and will continue with haloperidol trial as per court order    Impression  Exacerbation of schizophrenia in the context of noncompliance    PLAN  Patient requires acute inpatient care for treatment of psychosis.   Patient admitted on a 939 emergency status 9/13 and converted to 927 on 9/15 for the purpose of TOO  Administrative meeting for TOO 9/20 and for Court Hearing 9/26 which TOO was granted and medication started  Haloperidol 10 mg /diphenhydramine 50 mg /Lorazepam 2 mg as per court order  Patient does not require constant observation at this time and will follow with 15 minute checks.  Treatment will include individual therapy/supportive therapy/ rehab therapy/ psychopharmacological therapy and milieu therapy